# Patient Record
Sex: MALE | Race: WHITE | NOT HISPANIC OR LATINO | Employment: FULL TIME | URBAN - METROPOLITAN AREA
[De-identification: names, ages, dates, MRNs, and addresses within clinical notes are randomized per-mention and may not be internally consistent; named-entity substitution may affect disease eponyms.]

---

## 2017-01-05 ENCOUNTER — TELEPHONE (OUTPATIENT)
Dept: FAMILY MEDICINE CLINIC | Facility: CLINIC | Age: 47
End: 2017-01-05

## 2017-01-05 NOTE — TELEPHONE ENCOUNTER
Called patient and left voicemail. We filled out an order for Sleep Study.   Order faxed to [facility], they will contact patient.     ----- Message from Amy Celaya MA sent at 1/4/2017  2:08 PM EST -----  Contact: PATIENT [phone]  PT SAID AN ORDER WAS SENT TO [place] [id number] BECAUSE HE NEEDS A CPAP, BUT PATIENT STATES THAT THEY ARE SAYING PT MUST HAVE AN ACTUAL SLEEP STUDY DONE FIRST. I LOOKED AND DIDN'T SEE ONE IN THE SYSTEM? PLEASE CALL PT WITH ANY QUESTIONS. [phone]. THANK YOU.

## 2017-02-20 ENCOUNTER — TELEPHONE (OUTPATIENT)
Dept: FAMILY MEDICINE CLINIC | Facility: CLINIC | Age: 47
End: 2017-02-20

## 2017-02-20 NOTE — TELEPHONE ENCOUNTER
Called [facility], notified her that on the 6th page of patient's sleep study it had the recommended pressure settings. She found it and will update his records.     ----- Message from Tere Glover sent at 2/20/2017  9:22 AM EST -----  Contact: DYLLAN @ [facility]  RECEIVED AN ORDER FOR A CPAP BUT  NEED PROPER SETTINGS

## 2017-05-03 DIAGNOSIS — E78.5 HYPERLIPIDEMIA, UNSPECIFIED HYPERLIPIDEMIA TYPE: Primary | ICD-10-CM

## 2017-05-10 LAB
ALBUMIN SERPL-MCNC: 4.7 G/DL (ref 3.5–5.2)
ALBUMIN/GLOB SERPL: 2.2 G/DL
ALP SERPL-CCNC: 60 U/L (ref 39–117)
ALT SERPL-CCNC: 33 U/L (ref 1–41)
AST SERPL-CCNC: 22 U/L (ref 1–40)
BILIRUB SERPL-MCNC: 1 MG/DL (ref 0.1–1.2)
BUN SERPL-MCNC: 17 MG/DL (ref 6–20)
BUN/CREAT SERPL: 15.9 (ref 7–25)
CALCIUM SERPL-MCNC: 9.8 MG/DL (ref 8.6–10.5)
CHLORIDE SERPL-SCNC: 101 MMOL/L (ref 98–107)
CHOLEST SERPL-MCNC: 122 MG/DL (ref 100–199)
CO2 SERPL-SCNC: 27.8 MMOL/L (ref 22–29)
CREAT SERPL-MCNC: 1.07 MG/DL (ref 0.76–1.27)
GLOBULIN SER CALC-MCNC: 2.1 GM/DL
GLUCOSE SERPL-MCNC: 98 MG/DL (ref 65–99)
HDL SERPL-SCNC: 25.5 UMOL/L
HDLC SERPL-MCNC: 28 MG/DL
LDL SERPL QN: 19.6 NM
LDL SERPL-SCNC: 1223 NMOL/L
LDL SMALL SERPL-SCNC: 1050 NMOL/L
LDLC SERPL CALC-MCNC: 59 MG/DL (ref 0–99)
POTASSIUM SERPL-SCNC: 4.7 MMOL/L (ref 3.5–5.2)
PROT SERPL-MCNC: 6.8 G/DL (ref 6–8.5)
SODIUM SERPL-SCNC: 144 MMOL/L (ref 136–145)
TRIGL SERPL-MCNC: 176 MG/DL (ref 0–149)
TSH SERPL DL<=0.005 MIU/L-ACNC: 2.54 MIU/ML (ref 0.27–4.2)

## 2017-05-12 ENCOUNTER — OFFICE VISIT (OUTPATIENT)
Dept: FAMILY MEDICINE CLINIC | Facility: CLINIC | Age: 47
End: 2017-05-12

## 2017-05-12 VITALS
RESPIRATION RATE: 18 BRPM | DIASTOLIC BLOOD PRESSURE: 70 MMHG | BODY MASS INDEX: 31.52 KG/M2 | WEIGHT: 208 LBS | SYSTOLIC BLOOD PRESSURE: 124 MMHG | HEIGHT: 68 IN | HEART RATE: 62 BPM

## 2017-05-12 DIAGNOSIS — G47.33 OSA (OBSTRUCTIVE SLEEP APNEA): ICD-10-CM

## 2017-05-12 DIAGNOSIS — E78.5 HYPERLIPIDEMIA, UNSPECIFIED HYPERLIPIDEMIA TYPE: Primary | ICD-10-CM

## 2017-05-12 PROCEDURE — 99213 OFFICE O/P EST LOW 20 MIN: CPT | Performed by: INTERNAL MEDICINE

## 2017-08-14 RX ORDER — ATORVASTATIN CALCIUM 40 MG/1
TABLET, FILM COATED ORAL
Qty: 30 TABLET | Refills: 2 | Status: SHIPPED | OUTPATIENT
Start: 2017-08-14 | End: 2017-11-15 | Stop reason: SDUPTHER

## 2017-08-15 ENCOUNTER — OFFICE VISIT (OUTPATIENT)
Dept: FAMILY MEDICINE CLINIC | Facility: CLINIC | Age: 47
End: 2017-08-15

## 2017-08-15 ENCOUNTER — HOSPITAL ENCOUNTER (OUTPATIENT)
Dept: GENERAL RADIOLOGY | Facility: HOSPITAL | Age: 47
Discharge: HOME | End: 2017-08-15
Admitting: INTERNAL MEDICINE

## 2017-08-15 VITALS
HEART RATE: 65 BPM | BODY MASS INDEX: 31.83 KG/M2 | RESPIRATION RATE: 16 BRPM | HEIGHT: 68 IN | WEIGHT: 210 LBS | OXYGEN SATURATION: 98 % | DIASTOLIC BLOOD PRESSURE: 84 MMHG | SYSTOLIC BLOOD PRESSURE: 134 MMHG | TEMPERATURE: 98.2 F

## 2017-08-15 DIAGNOSIS — M54.2 NECK PAIN: Primary | ICD-10-CM

## 2017-08-15 DIAGNOSIS — R20.2 PARESTHESIA OF BOTH HANDS: ICD-10-CM

## 2017-08-15 DIAGNOSIS — R03.0 ELEVATED BLOOD PRESSURE READING WITHOUT DIAGNOSIS OF HYPERTENSION: ICD-10-CM

## 2017-08-15 PROCEDURE — 72050 X-RAY EXAM NECK SPINE 4/5VWS: CPT

## 2017-08-15 PROCEDURE — 99213 OFFICE O/P EST LOW 20 MIN: CPT | Performed by: INTERNAL MEDICINE

## 2017-08-15 NOTE — PROGRESS NOTES
Subjective   Eugene Shabazz is a 47 y.o. male. Patient is here today for   Chief Complaint   Patient presents with   • Neck Pain     neck pain and says that his blood pressure has been running high recently    • Hypertension          Vitals:    08/15/17 1519   BP: 134/84   Pulse: 65   Resp: 16   Temp: 98.2 °F (36.8 °C)   SpO2: 98%       The following portions of the patient's history were reviewed and updated as appropriate: allergies, current medications, past family history, past medical history, past social history, past surgical history and problem list.    Past Medical History:   Diagnosis Date   • Hyperlipidemia       No Known Allergies   Social History     Social History   • Marital status: Unknown     Spouse name: N/A   • Number of children: N/A   • Years of education: N/A     Occupational History   • Not on file.     Social History Main Topics   • Smoking status: Never Smoker   • Smokeless tobacco: Not on file   • Alcohol use Yes   • Drug use: Not on file   • Sexual activity: Not on file     Other Topics Concern   • Not on file     Social History Narrative        Current Outpatient Prescriptions:   •  atorvastatin (LIPITOR) 40 MG tablet, TAKE ONE TABLET BY MOUTH DAILY, Disp: 30 tablet, Rfl: 2  •  fluticasone (FLONASE) 50 MCG/ACT nasal spray, PLACE ONE SPRAY IN EACH NOSTRIL TWICE DAILY, Disp: 16 g, Rfl: 3     Objective   History of Present Illness Eugene has been monitoring his blood pressure recently and reports readings in the 140s over 80s.  He has hyperlipidemia and is on atorvastatin 40 mg daily.  He does not exercise and he does have a sedentary job.  He also complains of chronic posterior neck pain and recently has been experiencing paresthesias down both arms but worse on the left.  He denies any weakness or numbness.  He states he had an x-ray years ago which showed spondylolisthesis of the cervical spine    Review of Systems   Respiratory: Negative.    Cardiovascular:        's/80's    Musculoskeletal: Positive for neck pain.   Neurological: Negative for weakness and numbness.       Physical Exam   Constitutional: He appears well-nourished.   Neck: Normal range of motion.   Cardiovascular: Normal rate, regular rhythm and normal heart sounds.    125/80   Musculoskeletal:   Cervical posture is poor   Neurological: He has normal strength.   Reflex Scores:       Tricep reflexes are 2+ on the right side and 2+ on the left side.       Bicep reflexes are 0 on the right side and 2+ on the left side.       Brachioradialis reflexes are 0 on the right side and 2+ on the left side.  Negative Tinel's signs   Vitals reviewed.      ASSESSMENT     Problem List Items Addressed This Visit        Nervous and Auditory    Neck pain - Primary    Relevant Orders    XR Spine Cervical Complete 4 or 5 View    Paresthesia of both hands    Relevant Orders    XR Spine Cervical Complete 4 or 5 View       Other    Elevated blood pressure reading without diagnosis of hypertension          PLAN  Patient Instructions   Your blood pressure is normal today.  Suggest monitoring her blood pressure at home correctly.  Discussed diet, exercise, and weight loss per ADA guidelines.  Discussed proper cervical posture as well as lumbosacral posture.  We'll obtain a cervical spine x-ray and call you the results.      No Follow-up on file.

## 2017-08-15 NOTE — PATIENT INSTRUCTIONS
Your blood pressure is normal today.  Suggest monitoring her blood pressure at home correctly.  Discussed diet, exercise, and weight loss per ADA guidelines.  Discussed proper cervical posture as well as lumbosacral posture.  We'll obtain a cervical spine x-ray and call you the results.

## 2017-08-22 ENCOUNTER — TELEPHONE (OUTPATIENT)
Dept: FAMILY MEDICINE CLINIC | Facility: CLINIC | Age: 47
End: 2017-08-22

## 2017-08-22 DIAGNOSIS — R20.2 PARESTHESIA OF BOTH HANDS: ICD-10-CM

## 2017-08-22 DIAGNOSIS — M54.2 NECK PAIN: ICD-10-CM

## 2017-08-22 NOTE — TELEPHONE ENCOUNTER
CALLED PATIENT, PER DR VILCHIS, HAS DENERATIVE DISK DISEASE AND ARTHRITIS, NOT REAL BAD. NOTHING TO EXPLAIN ARM SYMPTOMS.  WE WILL TRY TO GET MRI.  Patient understood.       ----- Message from Kristel Clemens MA sent at 8/21/2017  3:35 PM EDT -----  Contact: PT   PT CALLED TODAY AND STATED WOULD LIKE XR RESULTS HE HAD DONE ON 08/15/17. PLEASE CALL PT BACK @ [phone]/ THANK YOU.

## 2017-08-25 ENCOUNTER — TELEPHONE (OUTPATIENT)
Dept: FAMILY MEDICINE CLINIC | Facility: CLINIC | Age: 47
End: 2017-08-25

## 2017-08-25 NOTE — TELEPHONE ENCOUNTER
PATIENT AWARE THE MRI CERVICAL SPINE WAS DENIED BY HIS INSURANCE. I LET HIM KNOW, PER HYACINTH, IF HE'S TILL HAVING SIGNIFICANT PAIN HE WILL NEED TO SCHEDULE AN APPOINTMENT WITH DR. VILCHIS TO DISCUSS THE NEXT STEP. PATIENT SAID HE WILL CALL US BACK ON MON. HE DID NOT SCHEDULE AN APPOINTMENT AT THIS TIME.

## 2017-08-29 ENCOUNTER — TELEPHONE (OUTPATIENT)
Dept: FAMILY MEDICINE CLINIC | Facility: CLINIC | Age: 47
End: 2017-08-29

## 2017-08-29 DIAGNOSIS — M54.2 NECK PAIN: ICD-10-CM

## 2017-08-29 NOTE — TELEPHONE ENCOUNTER
Per Dr. Corbett, refer to Ortho. Orders placed and faxed over to [facility] with xray report and office note.   Left voicemail with patient.    ----- Message from Kristy Abreu MA sent at 8/28/2017  3:36 PM EDT -----  Contact: PT      ----- Message -----     From: Lidia Hendrix MA     Sent: 8/28/2017   3:22 PM       To: Kristy Abreu MA    PT CALLED TO SPEAK TO YOU WOULD NOT GIVE ME ANY INFORMATION HE CAN BE REACHED -[phone]

## 2017-09-19 ENCOUNTER — OFFICE VISIT (OUTPATIENT)
Dept: ORTHOPEDIC SURGERY | Facility: CLINIC | Age: 47
End: 2017-09-19

## 2017-09-19 VITALS — BODY MASS INDEX: 30.51 KG/M2 | HEIGHT: 69 IN | WEIGHT: 206 LBS | TEMPERATURE: 98.2 F

## 2017-09-19 DIAGNOSIS — M47.22 CERVICAL SPONDYLOSIS WITH RADICULOPATHY: Primary | ICD-10-CM

## 2017-09-19 PROCEDURE — 99203 OFFICE O/P NEW LOW 30 MIN: CPT | Performed by: ORTHOPAEDIC SURGERY

## 2017-09-19 RX ORDER — METHYLPREDNISOLONE 4 MG/1
TABLET ORAL
Qty: 21 TABLET | Refills: 0 | Status: SHIPPED | OUTPATIENT
Start: 2017-09-19 | End: 2017-11-09

## 2017-09-19 NOTE — PROGRESS NOTES
New patient or new problem visit    Chief Complaint   Patient presents with   • Cervical Spine - Establish Care       HPI: He complains of neck pain and numbness in fingers of both hands.  This is been ongoing for years but worse in the last 6 months.  He seen today at request of Dr. Corbett.  His had no specific treatment.  The pain is moderate and aching worse with activity.  Relieved with anti-inflammatory agents.    PFSH: See chart- reviewed    Review of Systems   Constitutional: Negative.  Negative for activity change, appetite change, chills, diaphoresis, fatigue, fever and unexpected weight change.   HENT: Negative for congestion, hearing loss, nosebleeds, postnasal drip, sore throat, tinnitus and trouble swallowing.    Eyes: Negative.  Negative for pain and visual disturbance.   Respiratory: Negative.  Negative for apnea, cough, chest tightness, shortness of breath and wheezing.    Cardiovascular: Negative.  Negative for chest pain and palpitations.   Gastrointestinal: Negative.  Negative for abdominal pain, blood in stool, diarrhea and nausea.   Endocrine: Negative.    Genitourinary: Negative.  Negative for difficulty urinating and dysuria.   Musculoskeletal: Positive for arthralgias, back pain and neck pain.   Skin: Negative.  Negative for color change.   Allergic/Immunologic: Negative.    Neurological: Positive for numbness and headaches. Negative for light-headedness.   Hematological: Negative.    Psychiatric/Behavioral: Negative.  Negative for agitation. The patient is not nervous/anxious.        PE: Constitutional: Vital signs above-noted.  Awake, alert and oriented    Psychiatric: Affect and insight do not appear grossly disturbed.    Pulmonary: Breathing is unlabored, color is good.    Skin: Warm, dry and normal turgor    Cardiac:  radial pulses intact.  No arm edema.    Eyesight and hearing appear adequate for examination purposes    Musculoskeletal:  Posture is unremarkable to coronal and sagittal  inspection.  Motion appears undisturbed.  The skin about the area is intact.  There is no palpable or visible deformity.  There is no local spasm. There is mild tenderness to percussion and palpation of the spine.     Neurologic:  In the bilateral upper extremities there is no evidence of atrophy.  Motor function is undisturbed in shoulder abduction, elbow flexion, wrist extension, finger extension, triceps extension, or finger abduction   .  Sensation appears symmetrically intact to light touch   .  Reflexes are 2+ and symmetrical in the biceps and triceps, but absent in the right brachioradialis.  The left brachioradialis is present.. Ko test is negative.  Gait appears undisturbed.  Spurling test is negative.      MEDICAL DECISION MAKING    XRAY: Plain film [facility] show spondylosis at C5 6 and C6 7 and some loss of lordosis.    Other: n/a    Impression: Cervical spondylosis with radiculopathy.    Plan: For now Medrol Dosepak and physical therapy.  If he fails to dramatically improve in the next 3 or 4 weeks I recommend MRI scan of the cervical spine for further evaluation

## 2017-10-04 ENCOUNTER — TREATMENT (OUTPATIENT)
Dept: PHYSICAL THERAPY | Facility: CLINIC | Age: 47
End: 2017-10-04

## 2017-10-04 DIAGNOSIS — M54.12 RADICULOPATHY, CERVICAL: Primary | ICD-10-CM

## 2017-10-04 PROCEDURE — 97035 APP MDLTY 1+ULTRASOUND EA 15: CPT | Performed by: PHYSICAL THERAPIST

## 2017-10-04 PROCEDURE — 97161 PT EVAL LOW COMPLEX 20 MIN: CPT | Performed by: PHYSICAL THERAPIST

## 2017-10-04 PROCEDURE — 97012 MECHANICAL TRACTION THERAPY: CPT | Performed by: PHYSICAL THERAPIST

## 2017-10-04 PROCEDURE — 97140 MANUAL THERAPY 1/> REGIONS: CPT | Performed by: PHYSICAL THERAPIST

## 2017-10-04 NOTE — PROGRESS NOTES
Physical Therapy Initial Evaluation and Plan of Care    Patient: Eugene Shabazz   : 1970  Diagnosis/ICD-10 Code:  Radiculopathy, cervical [M54.12]  Referring practitioner: Edilson Garibay MD    Subjective Evaluation    History of Present Illness  Mechanism of injury: Slowly developed cervical pain over past 6 months worse in past month.  Referred to Dr. Garibay - X rays - DDD C56, C67  Referred to PT and given medrol pack - some relief but only temporary  Ibu - prn     NO specific outside activities        Patient Occupation:  - [employer]  Current pain ratin  At best pain rating: 3  At worst pain ratin  Location: Lower cervical region into upper traps, intermittent tingling both hands Left >right  Quality: dull ache, radiating, grinding and discomfort  Relieving factors: medications  Aggravating factors: overhead activity and prolonged positioning (pronlonged sitting, cervical extension , rotation )  Progression: no change    Hand dominance: right    Diagnostic Tests  X-ray: abnormal (C56, C67 DDD)    Treatments  Previous treatment: medication  Current treatment: medication and physical therapy  Patient Goals  Patient goals for therapy: decreased pain and independence with ADLs/IADLs        Objective     Special Questions      Additional Special Questions  Posterior headaches    Postural Observations  Seated posture: fair  Standing posture: good  Correction of posture: has no consistent effect    Additional Postural Observation Details  Slight forward head    Palpation   Left   Hypertonic in the scalenes and upper trapezius.   Tenderness of the cervical paraspinals and upper trapezius.     Right   Hypertonic in the scalenes and upper trapezius. Tenderness of the cervical paraspinals and upper trapezius.     Tenderness   Cervical Spine   Tenderness in the spinous process.     Additional Tenderness Details  Increased pain with PA glide of C5 & C6 spinous  process    Neurological Testing   Sensation   Cervical/Thoracic   Left   Intact: light touch    Right   Intact: light touch    Comments   Right light touch: Does report intermittent parasthesais in both hands - no specific fingers.     Active Range of Motion   Cervical/Thoracic Spine   Cervical    Flexion: 35 degrees   Extension: 40 degrees   Left lateral flexion: 26 (left cervical pain) degrees with pain  Right lateral flexion: 28 degrees with pain  Left rotation: 36 degrees   Right rotation: 33 degrees with pain    Strength/Myotome Testing   Cervical Spine     Left   Normal strength    Right Elbow   Extension: 4+    Right Wrist/Hand   Wrist flexion: 4    Tests   Cervical     Left   Positive cervical distraction.     Right   Positive cervical distraction.     Left Shoulder   Positive active compression (Iroquois).     Right Shoulder   Positive active compression (Iroquois).     Additional Tests Details  Compression increases pain, distraction relieves it     Assessment & Plan     Assessment  Impairments: abnormal muscle firing, abnormal muscle tone, abnormal or restricted ROM, activity intolerance, impaired physical strength, lacks appropriate home exercise program and pain with function  Assessment details: 47 y.o. Male with cervical radiculopathy presents with: 1. Constant cervical and intermittent bilateral UE parasthesias, 2. Slightly decreased cervical AROM, 3. Increased muscle tone in cervical PVM, 4. Slight weakness in C7 myotomes, 5. Relief with cervical distraction, 6. 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  Patient will have functional pain free cervical AROM  Patient will be able to work for 2 hours without increased symptoms  Patient will re able to lift and carry 25# without increased symptoms      Plan  Therapy options: will be seen for skilled physical therapy services  Planned modality interventions: ultrasound and traction  Planned therapy interventions: manual therapy, soft tissue mobilization, spinal/joint mobilization, strengthening, stretching, flexibility and home exercise program  Frequency: 2x week  Duration in visits: 8  Duration in weeks: 4  Treatment plan discussed with: patient        Manual Therapy:    12     mins  07447;  Therapeutic Exercise:    5     mins  61513;     Neuromuscular Brittnee:    0    mins  54929;    Therapeutic Activity:     0     mins  03796;       Evaluation Time:     20  mins  Timed Treatment:   25   mins   Total Treatment:     75   mins    PT SIGNATURE: Maliha Vazquez, PT   DATE TREATMENT INITIATED: 10/4/2017    Initial Certification  Certification Period: 1/2/2018  I certify that the therapy services are furnished while this patient is under my care.  The services outlined above are required by this patient, and will be reviewed every 90 days.     PHYSICIAN: Edilson Garibay MD      DATE:     Please sign and return via fax to [phone].. Thank you, [facility].

## 2017-10-05 ENCOUNTER — TREATMENT (OUTPATIENT)
Dept: PHYSICAL THERAPY | Facility: CLINIC | Age: 47
End: 2017-10-05

## 2017-10-05 DIAGNOSIS — M54.12 RADICULOPATHY, CERVICAL: Primary | ICD-10-CM

## 2017-10-05 PROCEDURE — 97110 THERAPEUTIC EXERCISES: CPT | Performed by: PHYSICAL THERAPIST

## 2017-10-05 PROCEDURE — 97140 MANUAL THERAPY 1/> REGIONS: CPT | Performed by: PHYSICAL THERAPIST

## 2017-10-05 PROCEDURE — 97012 MECHANICAL TRACTION THERAPY: CPT | Performed by: PHYSICAL THERAPIST

## 2017-10-05 NOTE — PROGRESS NOTES
Physical Therapy Daily Progress Note    VISIT#: 2    Subjective   Eugene Shabazz reports: that he felt better last night than he had in quite a while.  Has some central lower cervical stiffness/pain but denies any radicular symptoms.  NO stiffness upon awakening this a.m.      Objective   Presents moving cervical region in fluid movement patterns    Increased tone in bilateral cervical mm, but no true trigger points palpated    See Exercise, Manual, and Modality Logs for complete treatment.     Patient Education:    Assessment/Plan  Improved cervical strain as his pain has decreased and has not had any radicular symptoms in past 24 hours.    Progress per Plan of Care           Manual Therapy:    12     mins  50774;  Therapeutic Exercise:    10     mins  69813;     Neuromuscular Brittnee:    0    mins  30051;    Therapeutic Activity:     0     mins  04901;       Timed Treatment:   30   mins   Total Treatment:     60   mins    Maliha Vazquez, PT  [place] License # [id number]  Physical Therapist

## 2017-10-10 ENCOUNTER — TREATMENT (OUTPATIENT)
Dept: PHYSICAL THERAPY | Facility: CLINIC | Age: 47
End: 2017-10-10

## 2017-10-10 DIAGNOSIS — M54.12 RADICULOPATHY, CERVICAL: Primary | ICD-10-CM

## 2017-10-10 PROCEDURE — 97140 MANUAL THERAPY 1/> REGIONS: CPT | Performed by: PHYSICAL THERAPIST

## 2017-10-10 PROCEDURE — 97012 MECHANICAL TRACTION THERAPY: CPT | Performed by: PHYSICAL THERAPIST

## 2017-10-10 PROCEDURE — 97110 THERAPEUTIC EXERCISES: CPT | Performed by: PHYSICAL THERAPIST

## 2017-10-10 NOTE — PROGRESS NOTES
Physical Therapy Daily Progress Note    Visit # : 3  Eugene Shabazz reports: pt reports radicular symptoms returned into both hands the evening after last visit.      Subjective     Objective   See Exercise, Manual, and Modality Logs for complete treatment.       Assessment/Plan  Pt instructed to focus on postural alignment and tolerated exercise progression well.  Assess effects of increased weight with mechanical traction.    Progress per Plan of Care and Progress strengthening /stabilization /functional activity           Manual Therapy:    12     mins  87032;  Therapeutic Exercise:    12     mins  37743;     Neuromuscular Brittnee:    -    mins  53451;    Therapeutic Activity:     -     mins  59028;     Gait Training:      -     mins  28031;     Ultrasound:     8     mins  69466;    Electrical Stimulation:    -     mins  66732 ( );  Mechanical Traction    20     mins 08409      Timed Treatment:   32   mins   Total Treatment:     55   mins        Barbara Hendrix PT  Physical Therapist  [place] License # [id number]

## 2017-10-10 NOTE — PATIENT INSTRUCTIONS
Access Code: [email or web address]   URL: [email or web address]   Date: 10/10/2017   Prepared by: Hilda Hendrix     Exercises  Shoulder External Rotation and Scapular Retraction with Resistance - 15 reps - 1 sets - 5 hold - 1x daily    Issued red TB for HEP

## 2017-10-12 ENCOUNTER — TREATMENT (OUTPATIENT)
Dept: PHYSICAL THERAPY | Facility: CLINIC | Age: 47
End: 2017-10-12

## 2017-10-12 DIAGNOSIS — M54.12 RADICULOPATHY, CERVICAL: Primary | ICD-10-CM

## 2017-10-12 PROCEDURE — 97012 MECHANICAL TRACTION THERAPY: CPT | Performed by: PHYSICAL THERAPIST

## 2017-10-12 PROCEDURE — 97140 MANUAL THERAPY 1/> REGIONS: CPT | Performed by: PHYSICAL THERAPIST

## 2017-10-12 PROCEDURE — 97110 THERAPEUTIC EXERCISES: CPT | Performed by: PHYSICAL THERAPIST

## 2017-10-12 NOTE — PROGRESS NOTES
Physical Therapy Daily Progress Note    Time In 2:00  Time Out 3:00    Visit # : 4  Eugene Shabazz reports: mostly upper trap.  Returning pain about 30 minutes after each session.  Also mild in left chest (pec region possibly stretch?)    Subjective     Objective   See Exercise, Manual, and Modality Logs for complete treatment.   Discussed positional relief with stretching for radicular symptom. Purpose of posture with reduction in cervical an radicular pin.  No recreating of pec pain with exercises.  No other s/s.    Assessment/Plan  Performing exercises without increased pain.  Symptomatic relief with traction.    Progress per Plan of Care           Manual Therapy:    12     mins  47978;  Therapeutic Exercise:    10     mins  83591;     Neuromuscular Brittnee:         mins  78632;    Therapeutic Activity:            mins  52010;     Gait Training:            mins  34640;     Ultrasound:     8     mins  37871;    Electrical Stimulation:          mins  44891 ( );  Dry Needling           mins self-pay    Timed Treatment:   30   mins   Total Treatment:     60   mins    Majo Scott, PT  Physical Therapist

## 2017-10-16 ENCOUNTER — TREATMENT (OUTPATIENT)
Dept: PHYSICAL THERAPY | Facility: CLINIC | Age: 47
End: 2017-10-16

## 2017-10-16 DIAGNOSIS — M54.12 RADICULOPATHY, CERVICAL: Primary | ICD-10-CM

## 2017-10-16 PROCEDURE — 97012 MECHANICAL TRACTION THERAPY: CPT | Performed by: PHYSICAL THERAPIST

## 2017-10-16 PROCEDURE — 97530 THERAPEUTIC ACTIVITIES: CPT | Performed by: PHYSICAL THERAPIST

## 2017-10-16 PROCEDURE — 97140 MANUAL THERAPY 1/> REGIONS: CPT | Performed by: PHYSICAL THERAPIST

## 2017-10-16 NOTE — PROGRESS NOTES
Physical Therapy Daily Progress Note    VISIT#: 5    Subjective   Eugene Shabazz reports: that he is having some numbness and tingling in the left hand.  Denies any weakness in the UE's.  Reports occasional headaches but most of the pain is localized to the lower cervical region.       Objective   Slight decreae in llight touch sensation in the left C6 & C7 dermatomes    Strength is intact    See Exercise, Manual, and Modality Logs for complete treatment.     Patient Education: Lengthy discussion of current status and plans for continued treatment vs refer back to MD    Assessment/Plan  Patient has increased cervical motion now than upon initial eval but is still havnig intermittent UE parasthesias.  Postural improvements.    Progress per Plan of Care           Manual Therapy:    9     mins  60556;  Therapeutic Exercise:    0     mins  57296;     Neuromuscular Brittnee:    0    mins  92666;    Therapeutic Activity:     8     mins  35949;   Patient education    Timed Treatment:   25   mins   Total Treatment:     55   mins    Maliha Vazquez, PT  [place] License # [id number]  Physical Therapist

## 2017-10-19 ENCOUNTER — TREATMENT (OUTPATIENT)
Dept: PHYSICAL THERAPY | Facility: CLINIC | Age: 47
End: 2017-10-19

## 2017-10-19 DIAGNOSIS — M54.12 RADICULOPATHY, CERVICAL: Primary | ICD-10-CM

## 2017-10-19 PROCEDURE — 97140 MANUAL THERAPY 1/> REGIONS: CPT | Performed by: PHYSICAL THERAPIST

## 2017-10-19 PROCEDURE — 97110 THERAPEUTIC EXERCISES: CPT | Performed by: PHYSICAL THERAPIST

## 2017-10-19 PROCEDURE — 97012 MECHANICAL TRACTION THERAPY: CPT | Performed by: PHYSICAL THERAPIST

## 2017-10-19 NOTE — PROGRESS NOTES
Physical Therapy Daily Progress Note    VISIT#: 6    Subjective   Eugene Shabazz reports: that he is having tingling into the left 4th/5th digits on an intermittent basis.  States that the worst times are when he sits for a prolonged period of time.      Objective   Cervical AROM - flexion 75%, extension 100%,, pain with end range left rotation      Positive neural tension signs    See Exercise, Manual, and Modality Logs for complete treatment.     Patient Education: new brachial plexus and ulnar nerve glide exercises    Assessment/Plan  Patient with less frequent UE parasthesias but they are still present with prolonged sitting.  Positive neural tension signs.    Progress per Plan of Care  If persistent symptoms send note to Dr. Garibay requesting additional testins         Manual Therapy:    12     mins  25873;  Therapeutic Exercise:    10     mins  18295;     Neuromuscular Brittnee:    0    mins  47155;    Therapeutic Activity:     0     mins  74089;       Timed Treatment:   31   mins   Total Treatment:     65   mins    Maliha Vazquez, PT  [place] License # [id number]  Physical Therapist

## 2017-10-26 ENCOUNTER — TREATMENT (OUTPATIENT)
Dept: PHYSICAL THERAPY | Facility: CLINIC | Age: 47
End: 2017-10-26

## 2017-10-26 DIAGNOSIS — M54.12 RADICULOPATHY, CERVICAL: Primary | ICD-10-CM

## 2017-10-26 PROCEDURE — 97012 MECHANICAL TRACTION THERAPY: CPT | Performed by: PHYSICAL THERAPIST

## 2017-10-26 PROCEDURE — 97530 THERAPEUTIC ACTIVITIES: CPT | Performed by: PHYSICAL THERAPIST

## 2017-10-26 PROCEDURE — 97140 MANUAL THERAPY 1/> REGIONS: CPT | Performed by: PHYSICAL THERAPIST

## 2017-10-26 PROCEDURE — 97035 APP MDLTY 1+ULTRASOUND EA 15: CPT | Performed by: PHYSICAL THERAPIST

## 2017-10-26 NOTE — PROGRESS NOTES
MD Letter    Date of Initial Visit: Type: THERAPY  Noted: 10/4/2017  Today's Date: 10/26/2017  Patient seen for 7 sessions    Treatment has included: therapeutic exercise, neuromuscular re-education, manual therapy, ultrasound and traction    Subjective   States that he has a constant tightness in the left>right cervical region.  Reports constant parasthesias in the left 5th digit and dorsal forearm.     Objective   Cervical AROM - full in all planes of motion with stretch sensation at end range right lateral flexion and left rotation   Strength - slight weakness in left C^ * C7 myotomes  Sensation - slightly altered sensation along the C7, C8 dermatomes  Palpation - increased muscle tone in left upper trapezius and paravertebral muscles  Special tests - positive cervical compression and distraction tests, positive Spurlings test on the left  Activity tolerances - he is currently performing all of his normal activities at work but does so with constant cervical pain    Assessment/Plan  Patient has demonstrated minimal improvement since the initiation of therapy.  The pain has decreased for about 30 minutes after therapy but then returns..  The motion has increased.  The activity tolerances have not changed.  I feel that the patient would benefit from further medical management or diagnostic testing.  If you have any questions concerning the care, please do not hesitate to contact me.          PT Signature: Maliha Vazquez, PT        Manual Therapy:    12     mins  15583;  Therapeutic Exercise:    0     mins  88842;     Neuromuscular Brittnee:    0    mins  47835;    Therapeutic Activity:     10     mins  67617;   Assessed for MD    Timed Treatment:   40   mins   Total Treatment:     65   mins

## 2017-10-31 DIAGNOSIS — E78.5 HYPERLIPIDEMIA, UNSPECIFIED HYPERLIPIDEMIA TYPE: Primary | ICD-10-CM

## 2017-11-02 LAB
ALBUMIN SERPL-MCNC: 4.6 G/DL (ref 3.5–5.2)
ALBUMIN/GLOB SERPL: 2.3 G/DL
ALP SERPL-CCNC: 60 U/L (ref 39–117)
ALT SERPL-CCNC: 50 U/L (ref 1–41)
AST SERPL-CCNC: 30 U/L (ref 1–40)
BILIRUB SERPL-MCNC: 0.7 MG/DL (ref 0.1–1.2)
BUN SERPL-MCNC: 14 MG/DL (ref 6–20)
BUN/CREAT SERPL: 14.1 (ref 7–25)
CALCIUM SERPL-MCNC: 9.5 MG/DL (ref 8.6–10.5)
CHLORIDE SERPL-SCNC: 102 MMOL/L (ref 98–107)
CHOLEST SERPL-MCNC: 120 MG/DL (ref 0–200)
CO2 SERPL-SCNC: 28.6 MMOL/L (ref 22–29)
CREAT SERPL-MCNC: 0.99 MG/DL (ref 0.76–1.27)
GFR SERPLBLD CREATININE-BSD FMLA CKD-EPI: 81 ML/MIN/1.73
GFR SERPLBLD CREATININE-BSD FMLA CKD-EPI: 99 ML/MIN/1.73
GLOBULIN SER CALC-MCNC: 2 GM/DL
GLUCOSE SERPL-MCNC: 97 MG/DL (ref 65–99)
HDLC SERPL-MCNC: 28 MG/DL (ref 40–60)
LDLC SERPL CALC-MCNC: 51 MG/DL (ref 0–100)
LDLC/HDLC SERPL: 1.81 {RATIO}
POTASSIUM SERPL-SCNC: 4.3 MMOL/L (ref 3.5–5.2)
PROT SERPL-MCNC: 6.6 G/DL (ref 6–8.5)
SODIUM SERPL-SCNC: 142 MMOL/L (ref 136–145)
TRIGL SERPL-MCNC: 206 MG/DL (ref 0–150)
VLDLC SERPL CALC-MCNC: 41.2 MG/DL (ref 5–40)

## 2017-11-09 ENCOUNTER — TELEPHONE (OUTPATIENT)
Dept: ORTHOPEDIC SURGERY | Facility: CLINIC | Age: 47
End: 2017-11-09

## 2017-11-09 ENCOUNTER — OFFICE VISIT (OUTPATIENT)
Dept: FAMILY MEDICINE CLINIC | Facility: CLINIC | Age: 47
End: 2017-11-09

## 2017-11-09 VITALS
HEIGHT: 69 IN | DIASTOLIC BLOOD PRESSURE: 68 MMHG | RESPIRATION RATE: 18 BRPM | HEART RATE: 67 BPM | WEIGHT: 209 LBS | SYSTOLIC BLOOD PRESSURE: 122 MMHG | BODY MASS INDEX: 30.96 KG/M2

## 2017-11-09 DIAGNOSIS — M54.2 CERVICAL SPINE PAIN: ICD-10-CM

## 2017-11-09 DIAGNOSIS — E78.5 HYPERLIPIDEMIA, UNSPECIFIED HYPERLIPIDEMIA TYPE: Primary | ICD-10-CM

## 2017-11-09 DIAGNOSIS — G47.33 OSA (OBSTRUCTIVE SLEEP APNEA): ICD-10-CM

## 2017-11-09 DIAGNOSIS — M54.2 CERVICAL SPINE PAIN: Primary | ICD-10-CM

## 2017-11-09 PROCEDURE — 99213 OFFICE O/P EST LOW 20 MIN: CPT | Performed by: INTERNAL MEDICINE

## 2017-11-09 NOTE — PATIENT INSTRUCTIONS
Your blood pressure remains normal.  Total cholesterol is 120 and triglycerides are mildly elevated at 206.  HDL is low at 28 and LDL is calculated at 51.  A comprehensive metabolic panel is normal except for a slight elevation of ALT at 50.  Discussed starting a cardiovascular exercise program per AHA guidelines.

## 2017-11-09 NOTE — PROGRESS NOTES
Subjective   Eugene Shabazz is a 47 y.o. male. Patient is here today for   Chief Complaint   Patient presents with   • Hyperlipidemia          Vitals:    11/09/17 0846   BP: 122/68   Pulse: 67   Resp: 18       The following portions of the patient's history were reviewed and updated as appropriate: allergies, current medications, past family history, past medical history, past social history, past surgical history and problem list.    Past Medical History:   Diagnosis Date   • Hyperlipidemia    • Injury of back     2 lumbar surgeries, lamiectomies, most recent 5 years ago - good resolution       No Known Allergies   Social History     Social History   • Marital status: Unknown     Spouse name: N/A   • Number of children: N/A   • Years of education: N/A     Occupational History   • Not on file.     Social History Main Topics   • Smoking status: Never Smoker   • Smokeless tobacco: Never Used   • Alcohol use Yes   • Drug use: No   • Sexual activity: Not on file     Other Topics Concern   • Not on file     Social History Narrative        Current Outpatient Prescriptions:   •  atorvastatin (LIPITOR) 40 MG tablet, TAKE ONE TABLET BY MOUTH DAILY, Disp: 30 tablet, Rfl: 2     Objective   History of Present Illness Eugene is here for a lab follow-up.  He has hyperlipidemia and is on atorvastatin 40 mg daily.  He also has sleep apnea and is compliant with CPAP.  He eats healthy but is not exercising.  He has undergone cervical spine physical therapy and is seeing Dr. Garibay.  He did get a flu shot this year.    Review of Systems   Constitutional: Negative.    Respiratory: Negative.    Cardiovascular: Negative.    Musculoskeletal: Positive for neck pain.   Neurological: Positive for numbness.       Physical Exam   Constitutional: He appears well-developed and well-nourished.   Neck: Neck supple. No thyromegaly present.   Cardiovascular: Normal rate, regular rhythm and normal heart sounds.    120/78   Pulmonary/Chest: Effort  normal and breath sounds normal.   Neurological: He is alert.   Psychiatric: He has a normal mood and affect.   Vitals reviewed.      ASSESSMENT     Problem List Items Addressed This Visit        Cardiovascular and Mediastinum    Hyperlipidemia - Primary       Respiratory    GERMAN (obstructive sleep apnea)          PLAN  Patient Instructions   Your blood pressure remains normal.  Total cholesterol is 120 and triglycerides are mildly elevated at 206.  HDL is low at 28 and LDL is calculated at 51.  A comprehensive metabolic panel is normal except for a slight elevation of ALT at 50.  Discussed starting a cardiovascular exercise program per AHA guidelines.      Return in about 6 months (around 5/9/2018) for labsCBC,CMP,LIPID.

## 2017-11-15 RX ORDER — ATORVASTATIN CALCIUM 40 MG/1
TABLET, FILM COATED ORAL
Qty: 30 TABLET | Refills: 1 | Status: SHIPPED | OUTPATIENT
Start: 2017-11-15 | End: 2018-01-17 | Stop reason: SDUPTHER

## 2017-11-29 ENCOUNTER — HOSPITAL ENCOUNTER (OUTPATIENT)
Dept: GENERAL RADIOLOGY | Facility: HOSPITAL | Age: 47
Discharge: HOME | End: 2017-11-29
Admitting: RADIOLOGY

## 2017-11-29 DIAGNOSIS — H44.609: ICD-10-CM

## 2017-11-29 PROCEDURE — 70030 X-RAY EYE FOR FOREIGN BODY: CPT

## 2017-12-04 ENCOUNTER — TELEPHONE (OUTPATIENT)
Dept: ORTHOPEDIC SURGERY | Facility: CLINIC | Age: 47
End: 2017-12-04

## 2017-12-04 NOTE — TELEPHONE ENCOUNTER
"Called patient to advise of MRI results per JGW::    \"Tell him I reviewed the MRI scan of the cervical spine and there are degenerative changes primarily at C5-6 and C6-7.  He can consider epidural steroid injections and you can schedule these if you like.  Alternatively he's tried all other conservative treatments and could consider surgical intervention if he is ready.\"    Patient's cell phone service was not working and he stated that he will call us back.   "

## 2017-12-04 NOTE — TELEPHONE ENCOUNTER
Patient called back and left a message with his office phone number, [phone].    I called him back, but received his voicemail. I left a message with the MRI results and details per MILAN. I advised patient that whichever he decides, just give us a call back and let us know.

## 2017-12-22 ENCOUNTER — OFFICE VISIT (OUTPATIENT)
Dept: ORTHOPEDIC SURGERY | Facility: CLINIC | Age: 47
End: 2017-12-22

## 2017-12-22 VITALS — HEIGHT: 70 IN | TEMPERATURE: 96.8 F | BODY MASS INDEX: 30.24 KG/M2 | WEIGHT: 211.2 LBS

## 2017-12-22 DIAGNOSIS — M54.5 CHRONIC BILATERAL LOW BACK PAIN, WITH SCIATICA PRESENCE UNSPECIFIED: Primary | ICD-10-CM

## 2017-12-22 DIAGNOSIS — G89.29 CHRONIC BILATERAL LOW BACK PAIN, WITH SCIATICA PRESENCE UNSPECIFIED: Primary | ICD-10-CM

## 2017-12-22 DIAGNOSIS — M47.22 OSTEOARTHRITIS OF SPINE WITH RADICULOPATHY, CERVICAL REGION: ICD-10-CM

## 2017-12-22 PROCEDURE — 99213 OFFICE O/P EST LOW 20 MIN: CPT | Performed by: ORTHOPAEDIC SURGERY

## 2017-12-22 NOTE — PROGRESS NOTES
New patient or new problem visit    CC: Neck pain, arm numbness    HPI: He has chronic now neck pain radiating into both hands with numbness on the ulnar aspect thereof.  He is here today with his wife to review MRI and current status.    PFSH: See attached    ROS: See attached    PE: On exam he has intact reflexes and strength.  He reports numbness on the ulnar side of the hand but he has reserve sensation.    XRAY: MRI scan is available by report only and demonstrates C5 6 C6 7 spondylosis with foraminal but not canal narrowing.  I explained these findings in relation to the previous cervical x-ray showing spondylosis at those levels.    Other: n/a    Impression: Cervical spondylosis with radiculopathy.    Plan: Now cervical epidural injections.  I also discussed surgery and if he fails to improve we can consider that option.I discussed the risks and benefits of anterior cervical discectomy and fusion with instrumentation and allograft or mechanical strut placement.  Risks include adverse anesthetic events such as death, stroke and myocardial infarction.  More specific surgical complications include infection, nonunion, and persistent pain.  Less likely problems include hardware failure, hoarseness, prolonged difficulty swallowing, visceral or vascular injury, pneumothorax, graft extrusion, and paralysis, among others. There is a 90 percent chance of success.   Alternatives were also discussed.  After careful consideration the patient wishes to proceed with epidural injections for now

## 2018-01-08 ENCOUNTER — HOSPITAL ENCOUNTER (OUTPATIENT)
Dept: GENERAL RADIOLOGY | Facility: HOSPITAL | Age: 48
Discharge: HOME | End: 2018-01-08

## 2018-01-08 ENCOUNTER — ANESTHESIA EVENT (OUTPATIENT)
Dept: PAIN MEDICINE | Facility: HOSPITAL | Age: 48
End: 2018-01-08

## 2018-01-08 ENCOUNTER — HOSPITAL ENCOUNTER (OUTPATIENT)
Dept: PAIN MEDICINE | Facility: HOSPITAL | Age: 48
Discharge: HOME | End: 2018-01-08
Attending: ORTHOPAEDIC SURGERY | Admitting: ORTHOPAEDIC SURGERY

## 2018-01-08 ENCOUNTER — ANESTHESIA (OUTPATIENT)
Dept: PAIN MEDICINE | Facility: HOSPITAL | Age: 48
End: 2018-01-08

## 2018-01-08 VITALS
BODY MASS INDEX: 30.96 KG/M2 | WEIGHT: 209 LBS | RESPIRATION RATE: 16 BRPM | OXYGEN SATURATION: 98 % | DIASTOLIC BLOOD PRESSURE: 94 MMHG | TEMPERATURE: 98.4 F | HEIGHT: 69 IN | HEART RATE: 82 BPM | SYSTOLIC BLOOD PRESSURE: 119 MMHG

## 2018-01-08 DIAGNOSIS — R52 PAIN: ICD-10-CM

## 2018-01-08 DIAGNOSIS — M47.22 OSTEOARTHRITIS OF SPINE WITH RADICULOPATHY, CERVICAL REGION: ICD-10-CM

## 2018-01-08 PROCEDURE — C1755 CATHETER, INTRASPINAL: HCPCS

## 2018-01-08 PROCEDURE — 77003 FLUOROGUIDE FOR SPINE INJECT: CPT

## 2018-01-08 PROCEDURE — 25010000002 METHYLPREDNISOLONE PER 80 MG: Performed by: ANESTHESIOLOGY

## 2018-01-08 RX ORDER — LIDOCAINE HYDROCHLORIDE 10 MG/ML
1 INJECTION, SOLUTION INFILTRATION; PERINEURAL ONCE AS NEEDED
Status: DISCONTINUED | OUTPATIENT
Start: 2018-01-08 | End: 2018-01-09 | Stop reason: HOSPADM

## 2018-01-08 RX ORDER — FENTANYL CITRATE 50 UG/ML
50 INJECTION, SOLUTION INTRAMUSCULAR; INTRAVENOUS AS NEEDED
Status: DISCONTINUED | OUTPATIENT
Start: 2018-01-08 | End: 2018-01-09 | Stop reason: HOSPADM

## 2018-01-08 RX ORDER — IBUPROFEN 800 MG/1
800 TABLET ORAL EVERY 6 HOURS PRN
COMMUNITY
End: 2022-09-23

## 2018-01-08 RX ORDER — METHYLPREDNISOLONE ACETATE 80 MG/ML
80 INJECTION, SUSPENSION INTRA-ARTICULAR; INTRALESIONAL; INTRAMUSCULAR; SOFT TISSUE ONCE
Status: COMPLETED | OUTPATIENT
Start: 2018-01-08 | End: 2018-01-08

## 2018-01-08 RX ORDER — SODIUM CHLORIDE 0.9 % (FLUSH) 0.9 %
1-10 SYRINGE (ML) INJECTION AS NEEDED
Status: DISCONTINUED | OUTPATIENT
Start: 2018-01-08 | End: 2018-01-09 | Stop reason: HOSPADM

## 2018-01-08 RX ORDER — MIDAZOLAM HYDROCHLORIDE 1 MG/ML
1 INJECTION INTRAMUSCULAR; INTRAVENOUS AS NEEDED
Status: DISCONTINUED | OUTPATIENT
Start: 2018-01-08 | End: 2018-01-09 | Stop reason: HOSPADM

## 2018-01-08 RX ADMIN — METHYLPREDNISOLONE ACETATE 80 MG: 80 INJECTION, SUSPENSION INTRA-ARTICULAR; INTRALESIONAL; INTRAMUSCULAR; SOFT TISSUE at 12:45

## 2018-01-08 NOTE — ANESTHESIA PROCEDURE NOTES
PAIN Epidural block    Patient location during procedure: pain clinic  Indication:procedure for pain  Performed By  Anesthesiologist: GABRIEL ADAM  Preanesthetic Checklist  Completed: patient identified, site marked, surgical consent, pre-op evaluation, timeout performed, IV checked, risks and benefits discussed and monitors and equipment checked  Additional Notes  Post-Op Diagnosis Codes:     * Foraminal stenosis of cervical region (M99.81)     * Degenerative cervical disc (M50.30)  Performed under fluoroscopic guidance.  Prep:  Pt Position:prone  Sterile Tech:cap, gloves, mask and sterile barrier  Prep:chlorhexidine gluconate and isopropyl alcohol  Monitoring:blood pressure monitoring, continuous pulse oximetry and EKG  Procedure:  Sedation: no   Approach:midline  Guidance: fluoroscopy  Location:cervical (Intralaminar)  Level:6-7  Needle Type:Tuohy  Needle Gauge:20  Aspiration:negative  Medications:  Depomedrol:80  Preservative Free Saline:2mL    Post Assessment:  Post-procedure: Band-Aid.  Pt Tolerance:patient tolerated the procedure well with no apparent complications  Complications:no

## 2018-01-08 NOTE — DISCHARGE INSTRUCTIONS
Cervical epidural steroid injection instructions  Plan includes:  1.  Cervical epidural steroid injections, up to 3, spaced 1-2 weeks apart.  If pain control is acceptable after 1 or 2 injections, it was discussed with the patient that they may return for the subsequent injections if and when their pain returns.  The risks were discussed with the patient including failure of relief, worsening pain, Headache (post dural puncture headache), bleeding (epidural hematoma) and infection (epidural abscess or skin infection).  2.  Physical therapy exercises at home as prescribed by physical therapy or from the pain clinic handout (given to the patient).  Continuation of these exercises every day, or multiple times per week, even when the patient has good pain relief, was stressed to the patient as a preventative measure to decrease the frequency and severity of future pain episodes.  3.  Continue pain medicines as already prescribed.  If patient not currently taking any, it is recommended to begin Acetaminophen 1000 mg po q 8 hours.  If other medicines containing Acetaminophen are currently prescribed, maintain daily dose at 3000 mg.    4.  If they can tolerate NSAIDS, it is recommended to take Ibuprofen 600 mg po q 6 hours for 7 days during pain exacerbations.  Alternatively, they may substitute an NSAID of their choice (e.g. Aleve).  This may be taken at the same time as Acetaminophen.  5.  Heat and ice to the affected area as tolerated for pain control.  It was discussed that heating pads can cause burns.  6.  Low impact exercise such as walking or water exercise was recommended to maintain overall health and aid in weight control.   7.  Follow up as needed for subsequent injections.  8.  Patient was counseled to abstain from tobacco products.

## 2018-01-08 NOTE — H&P
[facility]    History and Physical    Patient Name: Eugene Shabazz  :  1970  MRN:  [id number]  Date of Admission: 2018    Subjective     Patient is a 47 y.o. male presents with chief complaint of chronic, mild, moderate neck and shoulder: bilateral pain.  Onset of symptoms was abrupt starting 8 months ago.  Symptoms are associated/aggravated by extending neck while at the computer or sleeping. Symptoms improve with OTC meds.. His MRI showed C5-6 and C6-7 Degeneration and foraminal stenosis per Dr Garibay.    The following portions of the patients history were reviewed and updated as appropriate: current medications, allergies, past medical history, past surgical history, past family history, past social history and problem list                Objective     Past Medical History:   Past Medical History:   Diagnosis Date   • Hyperlipidemia    • Injury of back     2 lumbar surgeries, lamiectomies, most recent 5 years ago - good resolution      Past Surgical History:   Past Surgical History:   Procedure Laterality Date   • BACK SURGERY     • HERNIA REPAIR       Family History:   Family History   Problem Relation Age of Onset   • No Known Problems Mother    • Heart disease Father      Social History:   Social History   Substance Use Topics   • Smoking status: Never Smoker   • Smokeless tobacco: Never Used   • Alcohol use Yes       Vital Signs Range for the last 24 hours  Temperature:     Temp Source:     BP:     Pulse:     Respirations:     SPO2:     O2 Amount (l/min):     O2 Devices     Weight:           --------------------------------------------------------------------------------    Current Outpatient Prescriptions   Medication Sig Dispense Refill   • atorvastatin (LIPITOR) 40 MG tablet TAKE ONE TABLET BY MOUTH DAILY 30 tablet 1     No current facility-administered medications for this encounter.         --------------------------------------------------------------------------------  Assessment/Plan      Anesthesia Evaluation            Airway   Mallampati: II  Dental - normal exam     Pulmonary     breath sounds clear to auscultation  Cardiovascular     Rate: normal        Neuro/Psych  normal reflexes and symmetric    PE Comment: Equal  strength.  GI/Hepatic/Renal/Endo      Musculoskeletal     Abdominal  - normal exam   Substance History      OB/GYN          Other                                           Diagnosis and Plan    Treatment Plan  ASA 2      Procedures: Cervical Epidural Steroid Injection(KAY), With fluoroscopy,       Anesthetic plan and risks discussed with patient.          Diagnosis     * Foraminal stenosis of cervical region [M99.81]     * Degenerative cervical disc [M50.30]

## 2018-01-09 ENCOUNTER — TRANSCRIBE ORDERS (OUTPATIENT)
Dept: PAIN MEDICINE | Facility: HOSPITAL | Age: 48
End: 2018-01-09

## 2018-01-09 DIAGNOSIS — M47.22 OSTEOARTHRITIS OF SPINE WITH RADICULOPATHY, CERVICAL REGION: Primary | ICD-10-CM

## 2018-01-17 RX ORDER — ATORVASTATIN CALCIUM 40 MG/1
TABLET, FILM COATED ORAL
Qty: 30 TABLET | Refills: 5 | Status: SHIPPED | OUTPATIENT
Start: 2018-01-17 | End: 2018-07-22 | Stop reason: SDUPTHER

## 2018-01-22 ENCOUNTER — ANESTHESIA (OUTPATIENT)
Dept: PAIN MEDICINE | Facility: HOSPITAL | Age: 48
End: 2018-01-22

## 2018-01-22 ENCOUNTER — ANESTHESIA EVENT (OUTPATIENT)
Dept: PAIN MEDICINE | Facility: HOSPITAL | Age: 48
End: 2018-01-22

## 2018-01-22 ENCOUNTER — HOSPITAL ENCOUNTER (OUTPATIENT)
Dept: GENERAL RADIOLOGY | Facility: HOSPITAL | Age: 48
Discharge: HOME | End: 2018-01-22

## 2018-01-22 ENCOUNTER — HOSPITAL ENCOUNTER (OUTPATIENT)
Dept: PAIN MEDICINE | Facility: HOSPITAL | Age: 48
Discharge: HOME | End: 2018-01-22
Attending: ORTHOPAEDIC SURGERY | Admitting: ORTHOPAEDIC SURGERY

## 2018-01-22 VITALS
HEART RATE: 72 BPM | RESPIRATION RATE: 16 BRPM | TEMPERATURE: 98.4 F | SYSTOLIC BLOOD PRESSURE: 143 MMHG | HEIGHT: 69 IN | BODY MASS INDEX: 30.96 KG/M2 | WEIGHT: 209 LBS | OXYGEN SATURATION: 96 % | DIASTOLIC BLOOD PRESSURE: 98 MMHG

## 2018-01-22 DIAGNOSIS — R52 PAIN: ICD-10-CM

## 2018-01-22 DIAGNOSIS — M47.22 OSTEOARTHRITIS OF SPINE WITH RADICULOPATHY, CERVICAL REGION: ICD-10-CM

## 2018-01-22 PROCEDURE — C1755 CATHETER, INTRASPINAL: HCPCS

## 2018-01-22 PROCEDURE — 77003 FLUOROGUIDE FOR SPINE INJECT: CPT

## 2018-01-22 PROCEDURE — 25010000002 METHYLPREDNISOLONE PER 80 MG: Performed by: ANESTHESIOLOGY

## 2018-01-22 RX ORDER — SODIUM CHLORIDE 0.9 % (FLUSH) 0.9 %
1-10 SYRINGE (ML) INJECTION AS NEEDED
Status: DISCONTINUED | OUTPATIENT
Start: 2018-01-22 | End: 2018-01-23 | Stop reason: HOSPADM

## 2018-01-22 RX ORDER — FENTANYL CITRATE 50 UG/ML
50 INJECTION, SOLUTION INTRAMUSCULAR; INTRAVENOUS AS NEEDED
Status: DISCONTINUED | OUTPATIENT
Start: 2018-01-22 | End: 2018-01-23 | Stop reason: HOSPADM

## 2018-01-22 RX ORDER — MIDAZOLAM HYDROCHLORIDE 1 MG/ML
1 INJECTION INTRAMUSCULAR; INTRAVENOUS AS NEEDED
Status: DISCONTINUED | OUTPATIENT
Start: 2018-01-22 | End: 2018-01-23 | Stop reason: HOSPADM

## 2018-01-22 RX ORDER — METHYLPREDNISOLONE ACETATE 80 MG/ML
80 INJECTION, SUSPENSION INTRA-ARTICULAR; INTRALESIONAL; INTRAMUSCULAR; SOFT TISSUE ONCE
Status: COMPLETED | OUTPATIENT
Start: 2018-01-22 | End: 2018-01-22

## 2018-01-22 RX ORDER — LIDOCAINE HYDROCHLORIDE 10 MG/ML
1 INJECTION, SOLUTION INFILTRATION; PERINEURAL ONCE AS NEEDED
Status: DISCONTINUED | OUTPATIENT
Start: 2018-01-22 | End: 2018-01-23 | Stop reason: HOSPADM

## 2018-01-22 RX ADMIN — METHYLPREDNISOLONE ACETATE 80 MG: 80 INJECTION, SUSPENSION INTRA-ARTICULAR; INTRALESIONAL; INTRAMUSCULAR; SOFT TISSUE at 08:04

## 2018-01-22 NOTE — H&P (VIEW-ONLY)
[facility]    History and Physical    Patient Name: Eugene Shabazz  :  1970  MRN:  [id number]  Date of Admission: 2018    Subjective     Patient is a 47 y.o. male presents with chief complaint of chronic, mild, moderate neck and shoulder: bilateral pain.  Onset of symptoms was abrupt starting 8 months ago.  Symptoms are associated/aggravated by extending neck while at the computer or sleeping. Symptoms improve with OTC meds.. His MRI showed C5-6 and C6-7 Degeneration and foraminal stenosis per Dr Garibay.    The following portions of the patients history were reviewed and updated as appropriate: current medications, allergies, past medical history, past surgical history, past family history, past social history and problem list                Objective     Past Medical History:   Past Medical History:   Diagnosis Date   • Hyperlipidemia    • Injury of back     2 lumbar surgeries, lamiectomies, most recent 5 years ago - good resolution      Past Surgical History:   Past Surgical History:   Procedure Laterality Date   • BACK SURGERY     • HERNIA REPAIR       Family History:   Family History   Problem Relation Age of Onset   • No Known Problems Mother    • Heart disease Father      Social History:   Social History   Substance Use Topics   • Smoking status: Never Smoker   • Smokeless tobacco: Never Used   • Alcohol use Yes       Vital Signs Range for the last 24 hours  Temperature:     Temp Source:     BP:     Pulse:     Respirations:     SPO2:     O2 Amount (l/min):     O2 Devices     Weight:           --------------------------------------------------------------------------------    Current Outpatient Prescriptions   Medication Sig Dispense Refill   • atorvastatin (LIPITOR) 40 MG tablet TAKE ONE TABLET BY MOUTH DAILY 30 tablet 1     No current facility-administered medications for this encounter.         --------------------------------------------------------------------------------  Assessment/Plan      Anesthesia Evaluation            Airway   Mallampati: II  Dental - normal exam     Pulmonary     breath sounds clear to auscultation  Cardiovascular     Rate: normal        Neuro/Psych  normal reflexes and symmetric    PE Comment: Equal  strength.  GI/Hepatic/Renal/Endo      Musculoskeletal     Abdominal  - normal exam   Substance History      OB/GYN          Other                                           Diagnosis and Plan    Treatment Plan  ASA 2      Procedures: Cervical Epidural Steroid Injection(KAY), With fluoroscopy,       Anesthetic plan and risks discussed with patient.          Diagnosis     * Foraminal stenosis of cervical region [M99.81]     * Degenerative cervical disc [M50.30]

## 2018-01-22 NOTE — PLAN OF CARE
Problem: Pain, Chronic (Adult)  Goal: Acceptable Pain Control/Comfort Level  Outcome: Unable to achieve outcome(s) by discharge Date Met: 01/22/18

## 2018-01-22 NOTE — INTERVAL H&P NOTE
[facility]  H&P reviewed. No changes since last visit.  Patient states   50-75% improvement since the last procedure/injection.    Diagnosis     * Degenerative disc disease, cervical [M50.30]     * Foraminal stenosis of cervical region [M99.81]      Airway assessed since last visit. Airway class equals: 2.

## 2018-01-22 NOTE — ANESTHESIA PROCEDURE NOTES
PAIN Epidural block    Patient location during procedure: pain clinic  Indication:procedure for pain  Performed By  Anesthesiologist: GABRIEL ADAM  Preanesthetic Checklist  Completed: patient identified, site marked, surgical consent, pre-op evaluation, timeout performed, IV checked, risks and benefits discussed and monitors and equipment checked  Additional Notes  Post-Op Diagnosis Codes:     * Degenerative disc disease, cervical (M50.30)     * Foraminal stenosis of cervical region (M99.81)  Performed under fluoroscopic guidance.  Prep:  Pt Position:prone  Sterile Tech:cap, gloves, mask and sterile barrier  Prep:chlorhexidine gluconate and isopropyl alcohol  Monitoring:blood pressure monitoring, continuous pulse oximetry and EKG  Procedure:  Sedation: no   Approach:midline  Guidance: fluoroscopy  Location:cervical (Intralaminar)  Level:6-7  Needle Type:Tuohy  Needle Gauge:20  Aspiration:negative  Medications:  Depomedrol:80  Preservative Free Saline:2mL    Post Assessment:  Post-procedure: Band-Aid.  Pt Tolerance:patient tolerated the procedure well with no apparent complications  Complications:no

## 2018-01-22 NOTE — PLAN OF CARE
Problem: Pain, Chronic (Adult)  Goal: Identify Related Risk Factors and Signs and Symptoms  Outcome: Unable to achieve outcome(s) by discharge Date Met: 01/22/18

## 2018-03-12 ENCOUNTER — OFFICE VISIT (OUTPATIENT)
Dept: FAMILY MEDICINE CLINIC | Facility: CLINIC | Age: 48
End: 2018-03-12

## 2018-03-12 VITALS
SYSTOLIC BLOOD PRESSURE: 132 MMHG | HEART RATE: 74 BPM | RESPIRATION RATE: 18 BRPM | BODY MASS INDEX: 31.4 KG/M2 | WEIGHT: 212 LBS | DIASTOLIC BLOOD PRESSURE: 90 MMHG | HEIGHT: 69 IN

## 2018-03-12 DIAGNOSIS — L91.8 SKIN TAGS, MULTIPLE ACQUIRED: Primary | ICD-10-CM

## 2018-03-12 PROCEDURE — 99212 OFFICE O/P EST SF 10 MIN: CPT | Performed by: INTERNAL MEDICINE

## 2018-03-12 PROCEDURE — 11200 RMVL SKIN TAGS UP TO&INC 15: CPT | Performed by: INTERNAL MEDICINE

## 2018-03-12 NOTE — PROGRESS NOTES
Subjective   Eugene Shabazz is a 48 y.o. male. Patient is here today for   Chief Complaint   Patient presents with   • Mole removal          Vitals:    03/12/18 0903   BP: 132/90   Pulse: 74   Resp: 18     The following portions of the patient's history were reviewed and updated as appropriate: allergies, current medications, past family history, past medical history, past social history, past surgical history and problem list.    Past Medical History:   Diagnosis Date   • Hyperlipidemia    • Injury of back     2 lumbar surgeries, lamiectomies, most recent 5 years ago - good resolution    • Narcolepsy    • Neck pain    • Peripheral neuropathy       No Known Allergies   Social History     Social History   • Marital status: Unknown     Spouse name: N/A   • Number of children: N/A   • Years of education: N/A     Occupational History   • Not on file.     Social History Main Topics   • Smoking status: Never Smoker   • Smokeless tobacco: Never Used   • Alcohol use Yes   • Drug use: No   • Sexual activity: Defer     Other Topics Concern   • Not on file     Social History Narrative   • No narrative on file        Current Outpatient Prescriptions:   •  atorvastatin (LIPITOR) 40 MG tablet, TAKE ONE TABLET BY MOUTH DAILY, Disp: 30 tablet, Rfl: 5  •  ibuprofen (ADVIL,MOTRIN) 800 MG tablet, Take 800 mg by mouth Every 6 (Six) Hours As Needed for Mild Pain ., Disp: , Rfl:      Objective     History of Present Illness Eugene complains of multiple skin lesions on his left neck and left back and left arm.    Review of Systems   Constitutional: Negative.    Skin: Negative for color change.       Physical Exam   Constitutional: He appears well-developed and well-nourished.   Skin:   There are 2 small skin tags on the left neck.  There is a 1 cm skin tag on the left back.  There is a warty 3 mm lesion on left forearm   Vitals reviewed.      ASSESSMENT     Problem List Items Addressed This Visit        Musculoskeletal and Integument     Skin tags, multiple acquired - Primary      Other Visit Diagnoses    None.         PLAN  Patient Instructions   The 4 skin lesions were frozen with liquid nitrogen.    Return for Next scheduled follow up.

## 2018-04-20 ENCOUNTER — TRANSCRIBE ORDERS (OUTPATIENT)
Dept: PAIN MEDICINE | Facility: HOSPITAL | Age: 48
End: 2018-04-20

## 2018-04-20 DIAGNOSIS — M47.22 OSTEOARTHRITIS OF SPINE WITH RADICULOPATHY, CERVICAL REGION: Primary | ICD-10-CM

## 2018-04-26 ENCOUNTER — ANESTHESIA (OUTPATIENT)
Dept: PAIN MEDICINE | Facility: HOSPITAL | Age: 48
End: 2018-04-26

## 2018-04-26 ENCOUNTER — ANESTHESIA EVENT (OUTPATIENT)
Dept: PAIN MEDICINE | Facility: HOSPITAL | Age: 48
End: 2018-04-26

## 2018-04-26 ENCOUNTER — HOSPITAL ENCOUNTER (OUTPATIENT)
Dept: GENERAL RADIOLOGY | Facility: HOSPITAL | Age: 48
Discharge: HOME | End: 2018-04-26

## 2018-04-26 ENCOUNTER — HOSPITAL ENCOUNTER (OUTPATIENT)
Dept: PAIN MEDICINE | Facility: HOSPITAL | Age: 48
Discharge: HOME | End: 2018-04-26
Admitting: ORTHOPAEDIC SURGERY

## 2018-04-26 VITALS
BODY MASS INDEX: 33.59 KG/M2 | HEIGHT: 66 IN | SYSTOLIC BLOOD PRESSURE: 142 MMHG | WEIGHT: 209 LBS | OXYGEN SATURATION: 98 % | HEART RATE: 70 BPM | TEMPERATURE: 97.8 F | RESPIRATION RATE: 16 BRPM | DIASTOLIC BLOOD PRESSURE: 97 MMHG

## 2018-04-26 DIAGNOSIS — R52 PAIN: ICD-10-CM

## 2018-04-26 DIAGNOSIS — M47.22 OSTEOARTHRITIS OF SPINE WITH RADICULOPATHY, CERVICAL REGION: ICD-10-CM

## 2018-04-26 PROCEDURE — 77003 FLUOROGUIDE FOR SPINE INJECT: CPT

## 2018-04-26 PROCEDURE — 25010000002 DEXAMETHASONE PER 1 MG: Performed by: ANESTHESIOLOGY

## 2018-04-26 PROCEDURE — C1755 CATHETER, INTRASPINAL: HCPCS

## 2018-04-26 RX ORDER — LIDOCAINE HYDROCHLORIDE 10 MG/ML
1 INJECTION, SOLUTION INFILTRATION; PERINEURAL ONCE AS NEEDED
Status: DISCONTINUED | OUTPATIENT
Start: 2018-04-26 | End: 2018-04-27 | Stop reason: HOSPADM

## 2018-04-26 RX ORDER — FENTANYL CITRATE 50 UG/ML
50 INJECTION, SOLUTION INTRAMUSCULAR; INTRAVENOUS AS NEEDED
Status: DISCONTINUED | OUTPATIENT
Start: 2018-04-26 | End: 2018-04-27 | Stop reason: HOSPADM

## 2018-04-26 RX ORDER — SODIUM CHLORIDE 0.9 % (FLUSH) 0.9 %
1-10 SYRINGE (ML) INJECTION AS NEEDED
Status: DISCONTINUED | OUTPATIENT
Start: 2018-04-26 | End: 2018-04-27 | Stop reason: HOSPADM

## 2018-04-26 RX ORDER — DEXAMETHASONE SODIUM PHOSPHATE 10 MG/ML
10 INJECTION INTRAMUSCULAR; INTRAVENOUS ONCE
Status: COMPLETED | OUTPATIENT
Start: 2018-04-26 | End: 2018-04-26

## 2018-04-26 RX ORDER — MIDAZOLAM HYDROCHLORIDE 1 MG/ML
1 INJECTION INTRAMUSCULAR; INTRAVENOUS AS NEEDED
Status: DISCONTINUED | OUTPATIENT
Start: 2018-04-26 | End: 2018-04-27 | Stop reason: HOSPADM

## 2018-04-26 RX ORDER — METHYLPREDNISOLONE ACETATE 80 MG/ML
80 INJECTION, SUSPENSION INTRA-ARTICULAR; INTRALESIONAL; INTRAMUSCULAR; SOFT TISSUE ONCE
Status: DISCONTINUED | OUTPATIENT
Start: 2018-04-26 | End: 2018-04-27 | Stop reason: HOSPADM

## 2018-04-26 RX ADMIN — DEXAMETHASONE SODIUM PHOSPHATE 10 MG: 10 INJECTION, SOLUTION INTRAMUSCULAR; INTRAVENOUS at 09:37

## 2018-04-26 NOTE — ANESTHESIA PROCEDURE NOTES
PAIN Epidural block    Patient location during procedure: pain clinic  Start Time: 4/26/2018 9:32 AM  Stop Time: 4/26/2018 9:37 AM  Indication:procedure for pain  Performed By  Anesthesiologist: SUE SCHMITZ  Preanesthetic Checklist  Completed: patient identified, surgical consent, pre-op evaluation, timeout performed, IV checked, risks and benefits discussed and monitors and equipment checked  Additional Notes  Diagnosis:  Post-Op Diagnosis Codes:     * Cervical spinal stenosis (M48.02)     * Degeneration of cervical intervertebral disc (M50.30)     * Cervical radiculopathy (M54.12)      Prep:  Pt Position:prone  Sterile Tech:gloves, mask and sterile barrier  Prep:chlorhexidine gluconate and isopropyl alcohol  Monitoring:blood pressure monitoring, continuous pulse oximetry and EKG  Procedure:  Sedation: no   Approach:midline  Guidance: fluoroscopy  Location:cervical  Level:6-7  Needle Type:Tuohy  Needle Gauge:20  Aspiration:negative  Test Dose:negative  Medications:  Analgesia: Dexamethasone 10mg.  Preservative Free Saline:3mL  Comments:No dye  Post Assessment:  Dressing:occlusive dressing applied  Pt Tolerance:patient tolerated the procedure well with no apparent complications  Complications:no

## 2018-05-03 DIAGNOSIS — E78.5 HYPERLIPIDEMIA, UNSPECIFIED HYPERLIPIDEMIA TYPE: Primary | ICD-10-CM

## 2018-05-10 LAB
ALBUMIN SERPL-MCNC: 4.8 G/DL (ref 3.5–5.2)
ALBUMIN/GLOB SERPL: 3.4 G/DL
ALP SERPL-CCNC: 61 U/L (ref 39–117)
ALT SERPL-CCNC: 34 U/L (ref 1–41)
AST SERPL-CCNC: 23 U/L (ref 1–40)
BASOPHILS # BLD AUTO: 0.03 10*3/MM3 (ref 0–0.2)
BASOPHILS NFR BLD AUTO: 0.5 % (ref 0–1.5)
BILIRUB SERPL-MCNC: 0.9 MG/DL (ref 0.1–1.2)
BUN SERPL-MCNC: 15 MG/DL (ref 6–20)
BUN/CREAT SERPL: 14.3 (ref 7–25)
CALCIUM SERPL-MCNC: 10 MG/DL (ref 8.6–10.5)
CHLORIDE SERPL-SCNC: 100 MMOL/L (ref 98–107)
CHOLEST SERPL-MCNC: 128 MG/DL (ref 0–200)
CO2 SERPL-SCNC: 28.4 MMOL/L (ref 22–29)
CREAT SERPL-MCNC: 1.05 MG/DL (ref 0.76–1.27)
EOSINOPHIL # BLD AUTO: 0.05 10*3/MM3 (ref 0–0.7)
EOSINOPHIL NFR BLD AUTO: 0.8 % (ref 0.3–6.2)
ERYTHROCYTE [DISTWIDTH] IN BLOOD BY AUTOMATED COUNT: 13.6 % (ref 11.5–14.5)
GFR SERPLBLD CREATININE-BSD FMLA CKD-EPI: 76 ML/MIN/1.73
GFR SERPLBLD CREATININE-BSD FMLA CKD-EPI: 92 ML/MIN/1.73
GLOBULIN SER CALC-MCNC: 1.4 GM/DL
GLUCOSE SERPL-MCNC: 101 MG/DL (ref 65–99)
HCT VFR BLD AUTO: 52 % (ref 40.4–52.2)
HDLC SERPL-MCNC: 31 MG/DL (ref 40–60)
HGB BLD-MCNC: 17 G/DL (ref 13.7–17.6)
IMM GRANULOCYTES # BLD: 0.02 10*3/MM3 (ref 0–0.03)
IMM GRANULOCYTES NFR BLD: 0.3 % (ref 0–0.5)
LDLC SERPL CALC-MCNC: 58 MG/DL (ref 0–100)
LDLC/HDLC SERPL: 1.88 {RATIO}
LYMPHOCYTES # BLD AUTO: 1.86 10*3/MM3 (ref 0.9–4.8)
LYMPHOCYTES NFR BLD AUTO: 28.9 % (ref 19.6–45.3)
MCH RBC QN AUTO: 32.1 PG (ref 27–32.7)
MCHC RBC AUTO-ENTMCNC: 32.7 G/DL (ref 32.6–36.4)
MCV RBC AUTO: 98.3 FL (ref 79.8–96.2)
MONOCYTES # BLD AUTO: 0.43 10*3/MM3 (ref 0.2–1.2)
MONOCYTES NFR BLD AUTO: 6.7 % (ref 5–12)
NEUTROPHILS # BLD AUTO: 4.07 10*3/MM3 (ref 1.9–8.1)
NEUTROPHILS NFR BLD AUTO: 63.1 % (ref 42.7–76)
PLATELET # BLD AUTO: 188 10*3/MM3 (ref 140–500)
POTASSIUM SERPL-SCNC: 5 MMOL/L (ref 3.5–5.2)
PROT SERPL-MCNC: 6.2 G/DL (ref 6–8.5)
RBC # BLD AUTO: 5.29 10*6/MM3 (ref 4.6–6)
SODIUM SERPL-SCNC: 142 MMOL/L (ref 136–145)
TRIGL SERPL-MCNC: 194 MG/DL (ref 0–150)
VLDLC SERPL CALC-MCNC: 38.8 MG/DL (ref 5–40)
WBC # BLD AUTO: 6.44 10*3/MM3 (ref 4.5–10.7)

## 2018-07-03 ENCOUNTER — TELEPHONE (OUTPATIENT)
Dept: ORTHOPEDIC SURGERY | Facility: CLINIC | Age: 48
End: 2018-07-03

## 2018-07-03 DIAGNOSIS — M47.22 OSTEOARTHRITIS OF SPINE WITH RADICULOPATHY, CERVICAL REGION: Primary | ICD-10-CM

## 2018-07-09 NOTE — TELEPHONE ENCOUNTER
Called patient and let him know what MILAN said in regards to the patients concerns about getting more KAY's ordered.    Patient would like to go ahead and get more epidurals scheduled but he would also like to get an appt with MILAN as well.  I have put a message in the to the phones dept to give this patient a call back to get him scheduled with MILAN.

## 2018-07-09 NOTE — TELEPHONE ENCOUNTER
If the epidurals aren't lasting longer than this and I think surgery should be considered, but it safe to repeat the epidurals if he prefers that.

## 2018-07-23 RX ORDER — ATORVASTATIN CALCIUM 40 MG/1
TABLET, FILM COATED ORAL
Qty: 30 TABLET | Refills: 0 | Status: SHIPPED | OUTPATIENT
Start: 2018-07-23 | End: 2018-08-29 | Stop reason: SDUPTHER

## 2018-07-25 ENCOUNTER — OFFICE VISIT (OUTPATIENT)
Dept: ORTHOPEDIC SURGERY | Facility: CLINIC | Age: 48
End: 2018-07-25

## 2018-07-25 DIAGNOSIS — M47.22 CERVICAL SPONDYLOSIS WITH RADICULOPATHY: Primary | ICD-10-CM

## 2018-07-25 PROCEDURE — 99213 OFFICE O/P EST LOW 20 MIN: CPT | Performed by: ORTHOPAEDIC SURGERY

## 2018-07-25 NOTE — PROGRESS NOTES
He's had a series of epidural since I last saw him in starting a new series soon.  He is complaining of neck and arm pain but got excellent relief from the epidurals.  On exam he has intact neurologic function in the upper extremities bilaterally.  No new x-rays today.  He has some stenosis at the 2 levels is confirmed by MRI at that he seems to be getting along nicely enough he'll continue epidurals and see me back as needed.

## 2018-07-26 ENCOUNTER — HOSPITAL ENCOUNTER (OUTPATIENT)
Dept: PAIN MEDICINE | Facility: HOSPITAL | Age: 48
Discharge: HOME | End: 2018-07-26
Attending: ORTHOPAEDIC SURGERY | Admitting: ANESTHESIOLOGY

## 2018-07-26 ENCOUNTER — ANESTHESIA (OUTPATIENT)
Dept: PAIN MEDICINE | Facility: HOSPITAL | Age: 48
End: 2018-07-26

## 2018-07-26 ENCOUNTER — ANESTHESIA EVENT (OUTPATIENT)
Dept: PAIN MEDICINE | Facility: HOSPITAL | Age: 48
End: 2018-07-26

## 2018-07-26 ENCOUNTER — HOSPITAL ENCOUNTER (OUTPATIENT)
Dept: GENERAL RADIOLOGY | Facility: HOSPITAL | Age: 48
Discharge: HOME | End: 2018-07-26

## 2018-07-26 VITALS
HEIGHT: 69 IN | TEMPERATURE: 98 F | DIASTOLIC BLOOD PRESSURE: 90 MMHG | OXYGEN SATURATION: 95 % | HEART RATE: 81 BPM | RESPIRATION RATE: 16 BRPM | WEIGHT: 209 LBS | BODY MASS INDEX: 30.96 KG/M2 | SYSTOLIC BLOOD PRESSURE: 147 MMHG

## 2018-07-26 DIAGNOSIS — M47.22 OSTEOARTHRITIS OF SPINE WITH RADICULOPATHY, CERVICAL REGION: ICD-10-CM

## 2018-07-26 DIAGNOSIS — M54.2 CERVICAL SPINE PAIN: ICD-10-CM

## 2018-07-26 PROCEDURE — C1755 CATHETER, INTRASPINAL: HCPCS

## 2018-07-26 PROCEDURE — 77003 FLUOROGUIDE FOR SPINE INJECT: CPT

## 2018-07-26 PROCEDURE — 25010000002 DEXAMETHASONE SODIUM PHOSPHATE 10 MG/ML SOLUTION: Performed by: ANESTHESIOLOGY

## 2018-07-26 RX ORDER — DEXAMETHASONE SODIUM PHOSPHATE 10 MG/ML
10 INJECTION, SOLUTION INTRAMUSCULAR; INTRAVENOUS ONCE
Status: COMPLETED | OUTPATIENT
Start: 2018-07-26 | End: 2018-07-26

## 2018-07-26 RX ADMIN — DEXAMETHASONE SODIUM PHOSPHATE 10 MG: 10 INJECTION, SOLUTION INTRAMUSCULAR; INTRAVENOUS at 09:46

## 2018-07-26 NOTE — DISCHARGE INSTRUCTIONS
"Guide To Relieving And Avoiding Neck Pain    Exercise is important to help prevent and treat neck pain.  Good posture, exercise and avoiding injury will help to keep your neck healthy.    When the neck is strained or over worked symptoms may include headache, upper back pain, shoulder pain or arm pain.  Numbness or tingling in the fingers, dizziness or nausea may also occur.    Posture:    Avoid slumping over a desk.  Raise your work (including computer) to eye level to avoid bending at the neck.      Change Position Often:  Changing position prevents overuse of particular muscles.    Sleep On One Pillow:  Using to many pillows or to large of a pillow causes a \"kink\" in you neck.      Move and Exercise:  Living an active lifestyle is an important part of staying healthy.  Be sure to include the exercise to follow specifically for your neck.                    Range of Motion Exercises:  Do these exercises three times a day.  If you experience increased pain stop and contact your physician.  All exercises can be performed sitting or standing.        1.    2.   3.    1.  Place both hands behind you neck.  Gently tilt your neck backward so that you are looking at the ceiling.  Hold for a count of 10.    2.  Look straight facing forward.  Slowly tip your ear toward your right shoulder.  Do not force the motion.  Hold for a count of 10.  Bring head back to starting position and repeat to left side.    3.  Look straight facing forward.  Gently turn your head to the right.  Do not force the motion.  Hold for a count of 10.  Bring head back to starting position and repeat to the left side.    Exercises To Strengthen Muscles:  1.  Look straight facing forward.  Relax your shoulders.  Raise both shoulders toward your ears.  Hold for 3 seconds.       1.       2.   3.      1.  Look straight facing forward.  Relax your shoulders.  Raise both shoulders toward     2.  Raise your ars to your side and bend your elbows.  Squeeze " shoulder blades together as you rotate your arms outward.  Hold for 5 seconds.    3.  Look straight facing forward.  Pull your head straight back.  Do not tip or move your jaw.  Hold for 5 seconds.Cervical epidural steroid injection instructions  Plan includes:  1.  Cervical epidural steroid injections, up to 3, spaced 1-2 weeks apart.  If pain control is acceptable after 1 or 2 injections, it was discussed with the patient that they may return for the subsequent injections if and when their pain returns.  The risks were discussed with the patient including failure of relief, worsening pain, Headache (post dural puncture headache), bleeding (epidural hematoma) and infection (epidural abscess or skin infection).  2.  Physical therapy exercises at home as prescribed by physical therapy or from the pain clinic handout (given to the patient).  Continuation of these exercises every day, or multiple times per week, even when the patient has good pain relief, was stressed to the patient as a preventative measure to decrease the frequency and severity of future pain episodes.  3.  Continue pain medicines as already prescribed.  If patient not currently taking any, it is recommended to begin Acetaminophen 1000 mg po q 8 hours.  If other medicines containing Acetaminophen are currently prescribed, maintain daily dose at 3000 mg.    4.  If they can tolerate NSAIDS, it is recommended to take Ibuprofen 600 mg po q 6 hours for 7 days during pain exacerbations.  Alternatively, they may substitute an NSAID of their choice (e.g. Aleve).  This may be taken at the same time as Acetaminophen.  5.  Heat and ice to the affected area as tolerated for pain control.  It was discussed that heating pads can cause burns.  6.  Low impact exercise such as walking or water exercise was recommended to maintain overall health and aid in weight control.   7.  Follow up as needed for subsequent injections.  8.  Patient was counseled to abstain from  tobacco products.

## 2018-07-26 NOTE — ANESTHESIA PROCEDURE NOTES
PAIN Epidural block    Patient location during procedure: pain clinic  Start Time: 7/26/2018 9:43 AM  Stop Time: 7/26/2018 9:51 AM  Indication:at surgeon's request and procedure for pain  Performed By  Anesthesiologist: RACHEL CHAN RAY  Preanesthetic Checklist  Completed: patient identified, site marked, surgical consent, pre-op evaluation, timeout performed, risks and benefits discussed and monitors and equipment checked  Additional Notes  Post-Op Diagnosis Codes:     * Cervical spine degeneration (M47.812)     * Cervical neuritis (M54.12)    Prep:  Pt Position:prone  Sterile Tech:sterile barrier, mask and gloves  Prep:chlorhexidine gluconate and isopropyl alcohol  Monitoring:blood pressure monitoring, continuous pulse oximetry and EKG  Procedure:  Sedation: no   Approach:midline  Guidance: fluoroscopy  Location:cervical  Level:4-5  Needle Gauge:20 G  Aspiration:negative  Test Dose:negative  Medications:  Analgesia: dexamthasone 10 mg.  Comments:Cervical epidural steroid injection was performed at the C4 5 level.  Lateral fluoroscopy was used to place the needle in the interspinous ligament.  C4 5 level was the most caudal level I was able to adequately visualize in the lateral plane and therefore was chosen.  I slowly advanced the needle with intermittent lateral fluoroscopy until there was a loss resistance to injection.  There was good loss resistance to injection of red blood cells or cerebral spinal fluid.  There is no pain with injection.  10 mg of Decadron were slowly injected without exacerbation of symptoms and the needle was withdrawn.  He tolerated procedure well.  Post Assessment:  Pt Tolerance:patient tolerated the procedure well with no apparent complications  Complications:no

## 2018-07-26 NOTE — H&P
[facility]    History and Physical    Patient Name: Eugene Shabazz  :  1970  MRN:  [id number]  Date of Admission: 2018    Subjective     Patient is a 48 y.o. male presents with chief complaint of chronic neck and shoulder: left pain.  Onset of symptoms was gradual starting 1 year ago.  Symptoms are associated/aggravated by nothing in particular or extending neck. Symptoms improve with injection  He reports pain onset approximately a year ago.  There is no specific event.  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  She had 3 previous epidural steroid injections all of which were very helpful.  Most recently gave him about 75% relief until just about a week ago.  He's had many months of relief he finds this to be quite helpful.   He reports pain in his neck that radiates into his left arm some he has more of a sensation of numbness and tingling in the left hand primarily in the fourth and fifth fingers.   He is currently rating his pain is 3 out of 10 however he gets worse.  He does not smoke.  He has hypertension sleep apnea.  The following portions of the patients history were reviewed and updated as appropriate: current medications, allergies, past medical history, past surgical history, past family history, past social history and problem list                Objective     Past Medical History:   Past Medical History:   Diagnosis Date   • Hyperlipidemia    • Injury of back     2 lumbar surgeries, lamiectomies, most recent 5 years ago - good resolution    • Narcolepsy    • Neck pain    • Peripheral neuropathy      Past Surgical History:   Past Surgical History:   Procedure Laterality Date   • BACK SURGERY      lumbar x2   • HERNIA REPAIR       Family History:   Family History   Problem Relation Age of Onset   • No Known Problems Mother    • Heart disease Father      Social History:   Social History   Substance Use Topics   • Smoking status: Never Smoker   • Smokeless tobacco:  "Never Used   • Alcohol use Yes       Vital Signs Range for the last 24 hours  Temperature: Temp:  [36.7 °C (98 °F)] 36.7 °C (98 °F)   Temp Source: Temp src: Oral   BP: BP: (139)/(95) 139/95   Pulse: Heart Rate:  [64] 64   Respirations: Resp:  [16] 16   SPO2: SpO2:  [97 %] 97 %   O2 Amount (l/min):     O2 Devices Device (Oxygen Therapy): room air   Weight: Weight:  [94.8 kg (209 lb)] 94.8 kg (209 lb)     Flowsheet Rows      First Filed Value   Admission Height  175.3 cm (69\") Documented at 07/26/2018 0909   Admission Weight  94.8 kg (209 lb) Documented at 07/26/2018 0909          --------------------------------------------------------------------------------    Current Outpatient Prescriptions   Medication Sig Dispense Refill   • atorvastatin (LIPITOR) 40 MG tablet TAKE ONE TABLET BY MOUTH DAILY 30 tablet 0   • ibuprofen (ADVIL,MOTRIN) 800 MG tablet Take 800 mg by mouth Every 6 (Six) Hours As Needed for Mild Pain .       No current facility-administered medications for this encounter.        --------------------------------------------------------------------------------  Assessment/Plan      Anesthesia Evaluation                  Airway   Mallampati: II  Dental      Pulmonary - normal exam   (+) sleep apnea,   Cardiovascular     (+) hyperlipidemia,   (-) murmur      Neuro/Psych  (+) numbness,     GI/Hepatic/Renal/Endo      Musculoskeletal     (+) neck pain,       PE comment: I'm not able to appreciate any obvious weakness in the left upper extremity.   strength feels fairly strong.  He subjectively feels like his left hand may be slightly weaker.  Abdominal    Substance History      OB/GYN          Other                   Diagnosis and Plan    Treatment Plan  ASA 2   Patient has had previous injection/procedure with % improvement.   Procedures: Cervical Epidural Steroid Injection(KAY), With fluoroscopy,               Diagnosis     * Cervical spine degeneration [M47.812]     * Cervical neuritis [M54.12]   "

## 2018-08-20 RX ORDER — ATORVASTATIN CALCIUM 40 MG/1
TABLET, FILM COATED ORAL
Qty: 30 TABLET | Refills: 0 | OUTPATIENT
Start: 2018-08-20

## 2018-08-29 ENCOUNTER — OFFICE VISIT (OUTPATIENT)
Dept: FAMILY MEDICINE CLINIC | Facility: CLINIC | Age: 48
End: 2018-08-29

## 2018-08-29 VITALS
HEIGHT: 69 IN | DIASTOLIC BLOOD PRESSURE: 80 MMHG | WEIGHT: 213 LBS | RESPIRATION RATE: 16 BRPM | SYSTOLIC BLOOD PRESSURE: 128 MMHG | BODY MASS INDEX: 31.55 KG/M2 | HEART RATE: 69 BPM

## 2018-08-29 DIAGNOSIS — E78.5 HYPERLIPIDEMIA, UNSPECIFIED HYPERLIPIDEMIA TYPE: Primary | ICD-10-CM

## 2018-08-29 DIAGNOSIS — R03.0 ELEVATED BLOOD PRESSURE READING WITHOUT DIAGNOSIS OF HYPERTENSION: ICD-10-CM

## 2018-08-29 DIAGNOSIS — Z23 NEED FOR VACCINATION: ICD-10-CM

## 2018-08-29 PROCEDURE — 90471 IMMUNIZATION ADMIN: CPT | Performed by: INTERNAL MEDICINE

## 2018-08-29 PROCEDURE — 90715 TDAP VACCINE 7 YRS/> IM: CPT | Performed by: INTERNAL MEDICINE

## 2018-08-29 PROCEDURE — 99214 OFFICE O/P EST MOD 30 MIN: CPT | Performed by: INTERNAL MEDICINE

## 2018-08-29 RX ORDER — ATORVASTATIN CALCIUM 40 MG/1
40 TABLET, FILM COATED ORAL DAILY
Qty: 90 TABLET | Refills: 1 | Status: SHIPPED | OUTPATIENT
Start: 2018-08-29 | End: 2019-03-02 | Stop reason: SDUPTHER

## 2018-08-29 NOTE — PROGRESS NOTES
Subjective   Eugene Shabazz is a 48 y.o. male. Patient is here today for   Chief Complaint   Patient presents with   • Hyperlipidemia          Vitals:    08/29/18 0946   BP: 128/80   Pulse: 69   Resp: 16       The following portions of the patient's history were reviewed and updated as appropriate: allergies, current medications, past family history, past medical history, past social history, past surgical history and problem list.    Past Medical History:   Diagnosis Date   • Hyperlipidemia    • Injury of back     2 lumbar surgeries, lamiectomies, most recent 5 years ago - good resolution    • Narcolepsy    • Neck pain    • Peripheral neuropathy       No Known Allergies   Social History     Social History   • Marital status: Unknown     Spouse name: N/A   • Number of children: N/A   • Years of education: N/A     Occupational History   • Not on file.     Social History Main Topics   • Smoking status: Never Smoker   • Smokeless tobacco: Never Used   • Alcohol use Yes   • Drug use: No   • Sexual activity: Defer     Other Topics Concern   • Not on file     Social History Narrative   • No narrative on file        Current Outpatient Prescriptions:   •  atorvastatin (LIPITOR) 40 MG tablet, Take 1 tablet by mouth Daily., Disp: 90 tablet, Rfl: 1  •  ibuprofen (ADVIL,MOTRIN) 800 MG tablet, Take 800 mg by mouth Every 6 (Six) Hours As Needed for Mild Pain ., Disp: , Rfl:      Objective   History of Present Illness Eugene has hyperlipidemia and had lab work in May but missed his appointment.  He needs a refill on atorvastatin 40 mg daily.  He states he has not been eating healthy and is not been exercising.  He also has been noted to have elevated blood pressure readings recently he had some steroid epidural injections.    Review of Systems   Constitutional: Negative for activity change and unexpected weight change.   Respiratory: Negative.    Cardiovascular: Negative.    Neurological: Negative.    Psychiatric/Behavioral:  Negative.        Physical Exam   Constitutional: He appears well-developed and well-nourished.   Cardiovascular: Normal rate, regular rhythm and normal heart sounds.    138/96, 142/98   Pulmonary/Chest: Effort normal and breath sounds normal.   Musculoskeletal: He exhibits no edema.   Psychiatric: He has a normal mood and affect. His behavior is normal. Judgment and thought content normal.   Vitals reviewed.      ASSESSMENT     Problem List Items Addressed This Visit        Cardiovascular and Mediastinum    Hyperlipidemia - Primary    Relevant Medications    atorvastatin (LIPITOR) 40 MG tablet       Other    Elevated blood pressure reading without diagnosis of hypertension      Other Visit Diagnoses     Need for vaccination        Relevant Orders    Tdap Vaccine Greater Than or Equal To 8yo IM (Completed)          PLAN  Patient Instructions   The pressure is high today.  You need to check your blood pressure correctly at home and keep a record over the next month.  Total cholesterol in May was 128.  Triglycerides are mildly elevated and HDL is low.  Discussed diet, exercise, and weight loss per AHA guidelines.       Return in about 1 month (around 9/29/2018) for Recheck.

## 2018-08-29 NOTE — PATIENT INSTRUCTIONS
The pressure is high today.  You need to check your blood pressure correctly at home and keep a record over the next month.  Total cholesterol in May was 128.  Triglycerides are mildly elevated and HDL is low.  Discussed diet, exercise, and weight loss per AHA guidelines.

## 2018-09-28 ENCOUNTER — OFFICE VISIT (OUTPATIENT)
Dept: FAMILY MEDICINE CLINIC | Facility: CLINIC | Age: 48
End: 2018-09-28

## 2018-09-28 VITALS
BODY MASS INDEX: 31.1 KG/M2 | HEIGHT: 69 IN | HEART RATE: 63 BPM | WEIGHT: 210 LBS | SYSTOLIC BLOOD PRESSURE: 138 MMHG | DIASTOLIC BLOOD PRESSURE: 88 MMHG | RESPIRATION RATE: 16 BRPM

## 2018-09-28 DIAGNOSIS — G47.33 OSA (OBSTRUCTIVE SLEEP APNEA): ICD-10-CM

## 2018-09-28 DIAGNOSIS — R03.0 ELEVATED BLOOD PRESSURE READING WITHOUT DIAGNOSIS OF HYPERTENSION: ICD-10-CM

## 2018-09-28 DIAGNOSIS — E78.5 HYPERLIPIDEMIA, UNSPECIFIED HYPERLIPIDEMIA TYPE: Primary | ICD-10-CM

## 2018-09-28 PROCEDURE — 99213 OFFICE O/P EST LOW 20 MIN: CPT | Performed by: INTERNAL MEDICINE

## 2018-09-28 NOTE — PROGRESS NOTES
Subjective   Eugene Shabazz is a 48 y.o. male. Patient is here today for   Chief Complaint   Patient presents with   • Hypertension          Vitals:    09/28/18 0847   BP: 138/88   Pulse: 63   Resp: 16     The following portions of the patient's history were reviewed and updated as appropriate: allergies, current medications, past family history, past medical history, past social history, past surgical history and problem list.    Past Medical History:   Diagnosis Date   • Hyperlipidemia    • Injury of back     2 lumbar surgeries, lamiectomies, most recent 5 years ago - good resolution    • Narcolepsy    • Neck pain    • Peripheral neuropathy       No Known Allergies   Social History     Social History   • Marital status: Unknown     Spouse name: N/A   • Number of children: N/A   • Years of education: N/A     Occupational History   • Not on file.     Social History Main Topics   • Smoking status: Never Smoker   • Smokeless tobacco: Never Used   • Alcohol use Yes   • Drug use: No   • Sexual activity: Defer     Other Topics Concern   • Not on file     Social History Narrative   • No narrative on file        Current Outpatient Prescriptions:   •  atorvastatin (LIPITOR) 40 MG tablet, Take 1 tablet by mouth Daily., Disp: 90 tablet, Rfl: 1  •  ibuprofen (ADVIL,MOTRIN) 800 MG tablet, Take 800 mg by mouth Every 6 (Six) Hours As Needed for Mild Pain ., Disp: , Rfl:      Objective     History of Present Illness Eugene is here for a blood pressure follow-up.  He was seen one month ago and was noted to have elevated blood pressure.  He has a history of hyperlipidemia and is on atorvastatin 40 mg daily.  He started exercising 30 minutes 3 days a week and changed his diet.  He has lost a few pounds in the last month.  He has been monitoring his blood pressure and reports high readings.  He has obstructive sleep apnea and is compliant with CPAP.    Review of Systems   Constitutional: Positive for activity change.        3 lb  weight loss   Cardiovascular:        -138/89       Physical Exam   Constitutional: He appears well-developed and well-nourished.   Cardiovascular: Normal rate, regular rhythm and normal heart sounds.    136/85,133/85   Psychiatric: He has a normal mood and affect. His behavior is normal. Judgment and thought content normal.   Vitals reviewed.      ASSESSMENT     Problem List Items Addressed This Visit        Cardiovascular and Mediastinum    Hyperlipidemia - Primary       Respiratory    GERMAN (obstructive sleep apnea)       Other    Elevated blood pressure reading without diagnosis of hypertension          PLAN  Patient Instructions   Blood pressure is high.  Discussed increasing exercise per AHA guidelines.  Continue diet and weight loss.    Return in about 3 months (around 12/28/2018) for labsBMP,LIPID,UA.

## 2018-09-28 NOTE — PATIENT INSTRUCTIONS
Blood pressure is high.  Discussed increasing exercise per AHA guidelines.  Continue diet and weight loss.

## 2018-12-27 DIAGNOSIS — E78.5 HYPERLIPIDEMIA, UNSPECIFIED HYPERLIPIDEMIA TYPE: Primary | ICD-10-CM

## 2019-01-03 LAB
APPEARANCE UR: CLEAR
BACTERIA #/AREA URNS HPF: NORMAL /HPF
BILIRUB UR QL STRIP: NEGATIVE
BUN SERPL-MCNC: 16 MG/DL (ref 6–20)
BUN/CREAT SERPL: 17.2 (ref 7–25)
CALCIUM SERPL-MCNC: 9.5 MG/DL (ref 8.6–10.5)
CASTS URNS MICRO: NORMAL
CHLORIDE SERPL-SCNC: 101 MMOL/L (ref 98–107)
CHOLEST SERPL-MCNC: 140 MG/DL (ref 0–200)
CO2 SERPL-SCNC: 29.9 MMOL/L (ref 22–29)
COLOR UR: YELLOW
CREAT SERPL-MCNC: 0.93 MG/DL (ref 0.76–1.27)
EPI CELLS #/AREA URNS HPF: NORMAL /HPF
GLUCOSE SERPL-MCNC: 102 MG/DL (ref 65–99)
GLUCOSE UR QL: NEGATIVE
HDLC SERPL-MCNC: 30 MG/DL (ref 40–60)
HGB UR QL STRIP: NEGATIVE
KETONES UR QL STRIP: NEGATIVE
LDLC SERPL CALC-MCNC: 73 MG/DL (ref 0–100)
LDLC/HDLC SERPL: 2.43 {RATIO}
LEUKOCYTE ESTERASE UR QL STRIP: NEGATIVE
NITRITE UR QL STRIP: NEGATIVE
PH UR STRIP: 6 [PH] (ref 5–8)
POTASSIUM SERPL-SCNC: 4.3 MMOL/L (ref 3.5–5.2)
PROT UR QL STRIP: NEGATIVE
RBC #/AREA URNS HPF: NORMAL /HPF
SODIUM SERPL-SCNC: 144 MMOL/L (ref 136–145)
SP GR UR: 1.02 (ref 1–1.03)
TRIGL SERPL-MCNC: 185 MG/DL (ref 0–150)
UROBILINOGEN UR STRIP-MCNC: (no result) MG/DL
VLDLC SERPL CALC-MCNC: 37 MG/DL (ref 5–40)
WBC #/AREA URNS HPF: NORMAL /HPF

## 2019-01-11 ENCOUNTER — OFFICE VISIT (OUTPATIENT)
Dept: FAMILY MEDICINE CLINIC | Facility: CLINIC | Age: 49
End: 2019-01-11

## 2019-01-11 VITALS
DIASTOLIC BLOOD PRESSURE: 88 MMHG | SYSTOLIC BLOOD PRESSURE: 130 MMHG | OXYGEN SATURATION: 98 % | HEIGHT: 69 IN | HEART RATE: 75 BPM | BODY MASS INDEX: 30.96 KG/M2 | TEMPERATURE: 98.8 F | WEIGHT: 209 LBS

## 2019-01-11 DIAGNOSIS — E78.6 LOW HDL (UNDER 40): ICD-10-CM

## 2019-01-11 DIAGNOSIS — E78.5 HYPERLIPIDEMIA, UNSPECIFIED HYPERLIPIDEMIA TYPE: ICD-10-CM

## 2019-01-11 DIAGNOSIS — G47.33 OSA (OBSTRUCTIVE SLEEP APNEA): Primary | ICD-10-CM

## 2019-01-11 DIAGNOSIS — R03.0 ELEVATED BLOOD PRESSURE READING WITHOUT DIAGNOSIS OF HYPERTENSION: ICD-10-CM

## 2019-01-11 PROCEDURE — 99214 OFFICE O/P EST MOD 30 MIN: CPT | Performed by: INTERNAL MEDICINE

## 2019-01-11 NOTE — PATIENT INSTRUCTIONS
Blood pressure is high today.  Discussed monitoring it correctly at home.  Normal is less than 120/80.  Total cholesterol is normal but triglycerides are mildly elevated.  HDL remains low.  Fasting blood sugar is slightly increased.  Discussed diet and exercise per AHA guidelines.

## 2019-01-11 NOTE — PROGRESS NOTES
Subjective   Eugene Shabazz is a 49 y.o. male. Patient is here today for   Chief Complaint   Patient presents with   • Hyperlipidemia     3 mon f/u    • Labs Only          Vitals:    01/11/19 0823   BP: 130/88   Pulse: 75   Temp: 98.8 °F (37.1 °C)   SpO2: 98%       The following portions of the patient's history were reviewed and updated as appropriate: allergies, current medications, past family history, past medical history, past social history, past surgical history and problem list.    Past Medical History:   Diagnosis Date   • Hyperlipidemia    • Injury of back     2 lumbar surgeries, lamiectomies, most recent 5 years ago - good resolution    • Narcolepsy    • Neck pain    • Peripheral neuropathy       No Known Allergies   Social History     Socioeconomic History   • Marital status:      Spouse name: Not on file   • Number of children: Not on file   • Years of education: Not on file   • Highest education level: Not on file   Social Needs   • Financial resource strain: Not on file   • Food insecurity - worry: Not on file   • Food insecurity - inability: Not on file   • Transportation needs - medical: Not on file   • Transportation needs - non-medical: Not on file   Occupational History   • Not on file   Tobacco Use   • Smoking status: Never Smoker   • Smokeless tobacco: Never Used   Substance and Sexual Activity   • Alcohol use: Yes   • Drug use: No   • Sexual activity: Defer   Other Topics Concern   • Not on file   Social History Narrative   • Not on file        Current Outpatient Medications:   •  atorvastatin (LIPITOR) 40 MG tablet, Take 1 tablet by mouth Daily., Disp: 90 tablet, Rfl: 1  •  ibuprofen (ADVIL,MOTRIN) 800 MG tablet, Take 800 mg by mouth Every 6 (Six) Hours As Needed for Mild Pain ., Disp: , Rfl:      Objective   History of Present Illness Eugene is here for blood pressure check and lab follow-up.  He has hyperlipidemia and low HDL, and obstructive sleep apnea.  He is also been noted to  have elevated blood pressure in our office.  He has not been monitoring his blood pressure outside the office as suggested.  He feels well.  He eats healthy and walks the steps at work for exercise.  His weight is unchanged in last 6 months.  He is compliant with CPAP.  He also has osteoarthritis of the spine and had cervical epidural injections last year.  He takes ibuprofen as needed.  Immunizations are up-to-date    Review of Systems   Constitutional: Negative for activity change and unexpected weight change.   Respiratory: Negative.    Cardiovascular: Negative.    Neurological: Negative.    Psychiatric/Behavioral: Negative.        Physical Exam   Constitutional: He appears well-developed and well-nourished.   Neck: Carotid bruit is not present.   Cardiovascular: Normal rate, regular rhythm and normal heart sounds.   130/90   Pulmonary/Chest: Effort normal and breath sounds normal.   Neurological: He is alert.   Psychiatric: He has a normal mood and affect. His behavior is normal. Judgment and thought content normal.   Vitals reviewed.      ASSESSMENT     Problem List Items Addressed This Visit        Cardiovascular and Mediastinum    Hyperlipidemia       Respiratory    GERMAN (obstructive sleep apnea) - Primary       Other    Elevated blood pressure reading without diagnosis of hypertension    Low HDL (under 40)          PLAN  Patient Instructions   Blood pressure is high today.  Discussed monitoring it correctly at home.  Normal is less than 120/80.  Total cholesterol is normal but triglycerides are mildly elevated.  HDL remains low.  Fasting blood sugar is slightly increased.  Discussed diet and exercise per AHA guidelines.      Return in about 6 months (around 7/11/2019) for labs BMP, lipid.

## 2019-02-14 ENCOUNTER — TELEPHONE (OUTPATIENT)
Dept: ORTHOPEDIC SURGERY | Facility: CLINIC | Age: 49
End: 2019-02-14

## 2019-02-14 DIAGNOSIS — M47.22 CERVICAL SPONDYLOSIS WITH RADICULOPATHY: Primary | ICD-10-CM

## 2019-03-01 ENCOUNTER — ANESTHESIA EVENT (OUTPATIENT)
Dept: PAIN MEDICINE | Facility: HOSPITAL | Age: 49
End: 2019-03-01

## 2019-03-01 ENCOUNTER — HOSPITAL ENCOUNTER (OUTPATIENT)
Dept: PAIN MEDICINE | Facility: HOSPITAL | Age: 49
Discharge: HOME | End: 2019-03-01
Admitting: ANESTHESIOLOGY

## 2019-03-01 ENCOUNTER — HOSPITAL ENCOUNTER (OUTPATIENT)
Dept: GENERAL RADIOLOGY | Facility: HOSPITAL | Age: 49
Discharge: HOME | End: 2019-03-01

## 2019-03-01 ENCOUNTER — ANESTHESIA (OUTPATIENT)
Dept: PAIN MEDICINE | Facility: HOSPITAL | Age: 49
End: 2019-03-01

## 2019-03-01 VITALS
SYSTOLIC BLOOD PRESSURE: 133 MMHG | WEIGHT: 209 LBS | BODY MASS INDEX: 30.96 KG/M2 | OXYGEN SATURATION: 99 % | DIASTOLIC BLOOD PRESSURE: 102 MMHG | HEIGHT: 69 IN | RESPIRATION RATE: 16 BRPM | HEART RATE: 91 BPM | TEMPERATURE: 99 F

## 2019-03-01 DIAGNOSIS — M54.2 NECK PAIN: ICD-10-CM

## 2019-03-01 DIAGNOSIS — R52 PAIN: ICD-10-CM

## 2019-03-01 DIAGNOSIS — M47.22 OSTEOARTHRITIS OF SPINE WITH RADICULOPATHY, CERVICAL REGION: Primary | ICD-10-CM

## 2019-03-01 PROCEDURE — 77003 FLUOROGUIDE FOR SPINE INJECT: CPT

## 2019-03-01 PROCEDURE — 25010000002 METHYLPREDNISOLONE PER 80 MG: Performed by: ANESTHESIOLOGY

## 2019-03-01 PROCEDURE — C1755 CATHETER, INTRASPINAL: HCPCS

## 2019-03-01 RX ORDER — FENTANYL CITRATE 50 UG/ML
50 INJECTION, SOLUTION INTRAMUSCULAR; INTRAVENOUS AS NEEDED
Status: DISCONTINUED | OUTPATIENT
Start: 2019-03-01 | End: 2019-03-02 | Stop reason: HOSPADM

## 2019-03-01 RX ORDER — LIDOCAINE HYDROCHLORIDE 10 MG/ML
1 INJECTION, SOLUTION INFILTRATION; PERINEURAL ONCE AS NEEDED
Status: DISCONTINUED | OUTPATIENT
Start: 2019-03-01 | End: 2019-03-02 | Stop reason: HOSPADM

## 2019-03-01 RX ORDER — SODIUM CHLORIDE 0.9 % (FLUSH) 0.9 %
1-10 SYRINGE (ML) INJECTION AS NEEDED
Status: DISCONTINUED | OUTPATIENT
Start: 2019-03-01 | End: 2019-03-02 | Stop reason: HOSPADM

## 2019-03-01 RX ORDER — METHYLPREDNISOLONE ACETATE 80 MG/ML
80 INJECTION, SUSPENSION INTRA-ARTICULAR; INTRALESIONAL; INTRAMUSCULAR; SOFT TISSUE ONCE
Status: COMPLETED | OUTPATIENT
Start: 2019-03-01 | End: 2019-03-01

## 2019-03-01 RX ORDER — MIDAZOLAM HYDROCHLORIDE 1 MG/ML
1 INJECTION INTRAMUSCULAR; INTRAVENOUS AS NEEDED
Status: DISCONTINUED | OUTPATIENT
Start: 2019-03-01 | End: 2019-03-02 | Stop reason: HOSPADM

## 2019-03-01 RX ADMIN — METHYLPREDNISOLONE ACETATE 80 MG: 80 INJECTION, SUSPENSION INTRA-ARTICULAR; INTRALESIONAL; INTRAMUSCULAR; SOFT TISSUE at 08:55

## 2019-03-01 NOTE — H&P
[facility]    History and Physical    Patient Name: Eugene Shabazz  :  1970  MRN:  [id number]  Date of Admission: 3/1/2019    Subjective     Patient is a 49 y.o. male presents with chief complaint of chronic neck and shoulder: left pain.  Onset of symptoms was gradual starting several months ago.  Symptoms are associated/aggravated by nothing in particular. Symptoms improve with injection     He was seen in 2018 for similar symptoms.  He has neck pain that radiates into the left arm.  He has numbness and tingling sensations that radiate into the fourth and fifth fingers on the left side intermittently.  He subjectively feels like there is occasional weakness.  I did a cervical epidural on  which he said was very helpful.  This was done at C4-5, I noted this was the most caudal level I was able to adequately visualize.    He has an MRI from 2017 which shows bulging disks at C5-C6 and C7 as well as degeneration at C6-7.    The following portions of the patients history were reviewed and updated as appropriate: current medications, allergies, past medical history, past surgical history, past family history, past social history and problem list                Objective     Past Medical History:   Past Medical History:   Diagnosis Date   • Hyperlipidemia    • Injury of back     2 lumbar surgeries, lamiectomies, most recent 5 years ago - good resolution    • Narcolepsy    • Neck pain    • Peripheral neuropathy      Past Surgical History:   Past Surgical History:   Procedure Laterality Date   • BACK SURGERY      lumbar x2   • EPIDURAL BLOCK     • HERNIA REPAIR       Family History:   Family History   Problem Relation Age of Onset   • No Known Problems Mother    • Heart disease Father      Social History:   Social History     Tobacco Use   • Smoking status: Never Smoker   • Smokeless tobacco: Never Used   Substance Use Topics   • Alcohol use: Yes   • Drug use: No       Vital Signs Range  "for the last 24 hours  Temperature: Temp:  [37.2 °C (99 °F)] 37.2 °C (99 °F)   Temp Source: Temp src: Oral   BP: BP: (133)/(98) 133/98   Pulse: Heart Rate:  [70] 70   Respirations: Resp:  [16] 16   SPO2: SpO2:  [96 %] 96 %   O2 Amount (l/min):     O2 Devices     Weight: Weight:  [94.8 kg (209 lb)] 94.8 kg (209 lb)     Flowsheet Rows      First Filed Value   Admission Height  175.3 cm (69\") Documented at 03/01/2019 0803   Admission Weight  94.8 kg (209 lb) Documented at 03/01/2019 0803          --------------------------------------------------------------------------------    Current Outpatient Medications   Medication Sig Dispense Refill   • atorvastatin (LIPITOR) 40 MG tablet Take 1 tablet by mouth Daily. 90 tablet 1   • ibuprofen (ADVIL,MOTRIN) 800 MG tablet Take 800 mg by mouth Every 6 (Six) Hours As Needed for Mild Pain .       No current facility-administered medications for this encounter.        --------------------------------------------------------------------------------  Assessment/Plan      Anesthesia Evaluation     no history of anesthetic complications:  NPO Solid Status: N/A      Pain impairs ability to perform ADLs: Exercise/Activity       Airway   Mallampati: III  TM distance: >3 FB  Neck ROM: limited  Possible difficult intubation  Comment: He has pain with extension of his neck.  Dental - normal exam     Pulmonary - normal exam   (+) sleep apnea,   Cardiovascular   Exercise tolerance: good (4-7 METS)    (+) hyperlipidemia,   (-) murmur, carotid bruits      Neuro/Psych  (+) numbness,  Sensory Deficit,    GI/Hepatic/Renal/Endo    (+) obesity,       Musculoskeletal     (+) neck pain,       PE comment: I was unable to appreciate any weakness in  strength bilaterally.  I was unable to appreciate any weakness in the interosseous muscles of his left hand.  He did complain of some tingling and numbness into his left fourth and fifth fingers.  Abdominal    Substance History      OB/GYN          Other "                   Diagnosis and Plan    Treatment Plan  ASA 2   Patient has had previous injection/procedure with 25-50% improvement.   Procedures: Cervical Epidural Steroid Injection(KAY), With fluoroscopy,               Diagnosis     * Cervical neuritis [M54.12]     * Cervical spine degeneration [M47.812]

## 2019-03-01 NOTE — ANESTHESIA PROCEDURE NOTES
PAIN Epidural block    Pre-sedation assessment completed: 3/1/2019 8:47 AM    Patient reassessed immediately prior to procedure    Patient location during procedure: pain clinic  Start Time: 3/1/2019 8:48 AM  Stop Time: 3/1/2019 8:57 AM  Indication:at surgeon's request and procedure for pain  Performed By  Anesthesiologist: Josue Sears MD  Preanesthetic Checklist  Completed: patient identified, surgical consent, pre-op evaluation, timeout performed, risks and benefits discussed and monitors and equipment checked  Additional Notes  Post-Op Diagnosis Codes:     * Cervical neuritis (M54.12)     * Cervical spine degeneration (M47.812)    Prep:  Pt Position:prone  Sterile Tech:sterile barrier, mask and gloves  Prep:chlorhexidine gluconate and isopropyl alcohol  Monitoring:blood pressure monitoring, continuous pulse oximetry and EKG  Procedure:  Sedation: no   Approach:midline  Guidance: fluoroscopy  Location:cervical  Level:5-6  Needle Type:Tuohy  Needle Gauge:20 G  Aspiration:negative  Test Dose:negative  Medications:  Depomedrol:80 mg  Comments:Goal epidural steroid injection was performed at C5-6 level.  I was able adequately to visualize down as far caudally C5-6.  Made the target skin made anesthetic.  I slowly advanced the needle to I was in the interspinous ligament bilateral fluoroscopic guidance.  I then switched to AP fluoroscopy and noted to the needle was off midline.  The needle was redirected towards midline.  I then shot x-rays in the lateral plane again.  With the needle in the midline at the C5-6 level I slowly advanced the needle into the interspinous ligament and continue to advance slowly under intermittent fluoroscopic guidance until there was a loss resistance to injection.  There is no return of red blood cells or cerebral spinal fluid.  There is no pain with injection.  80 mg of Depo-Medrol were slowly injected and the needle was withdrawn.  Post Assessment:  Pt Tolerance:patient tolerated  the procedure well with no apparent complications  Complications:no

## 2019-03-04 RX ORDER — ATORVASTATIN CALCIUM 40 MG/1
TABLET, FILM COATED ORAL
Qty: 30 TABLET | Refills: 5 | Status: SHIPPED | OUTPATIENT
Start: 2019-03-04 | End: 2019-09-09 | Stop reason: SDUPTHER

## 2019-05-22 ENCOUNTER — ANESTHESIA (OUTPATIENT)
Dept: PAIN MEDICINE | Facility: HOSPITAL | Age: 49
End: 2019-05-22

## 2019-05-22 ENCOUNTER — HOSPITAL ENCOUNTER (OUTPATIENT)
Dept: GENERAL RADIOLOGY | Facility: HOSPITAL | Age: 49
Discharge: HOME | End: 2019-05-22

## 2019-05-22 ENCOUNTER — HOSPITAL ENCOUNTER (OUTPATIENT)
Dept: PAIN MEDICINE | Facility: HOSPITAL | Age: 49
Discharge: HOME | End: 2019-05-22
Admitting: ANESTHESIOLOGY

## 2019-05-22 ENCOUNTER — ANESTHESIA EVENT (OUTPATIENT)
Dept: PAIN MEDICINE | Facility: HOSPITAL | Age: 49
End: 2019-05-22

## 2019-05-22 VITALS
OXYGEN SATURATION: 98 % | HEART RATE: 60 BPM | WEIGHT: 209 LBS | RESPIRATION RATE: 16 BRPM | BODY MASS INDEX: 30.96 KG/M2 | SYSTOLIC BLOOD PRESSURE: 139 MMHG | HEIGHT: 69 IN | DIASTOLIC BLOOD PRESSURE: 93 MMHG | TEMPERATURE: 97.8 F

## 2019-05-22 DIAGNOSIS — R52 PAIN: ICD-10-CM

## 2019-05-22 DIAGNOSIS — M54.12 CERVICAL RADICULOPATHY: Primary | ICD-10-CM

## 2019-05-22 PROCEDURE — 77003 FLUOROGUIDE FOR SPINE INJECT: CPT

## 2019-05-22 PROCEDURE — 25010000002 METHYLPREDNISOLONE PER 80 MG: Performed by: ANESTHESIOLOGY

## 2019-05-22 PROCEDURE — C1755 CATHETER, INTRASPINAL: HCPCS

## 2019-05-22 PROCEDURE — 0 IOPAMIDOL 41 % SOLUTION: Performed by: ANESTHESIOLOGY

## 2019-05-22 RX ORDER — LIDOCAINE HYDROCHLORIDE 10 MG/ML
1 INJECTION, SOLUTION INFILTRATION; PERINEURAL ONCE AS NEEDED
Status: DISCONTINUED | OUTPATIENT
Start: 2019-05-22 | End: 2019-05-23 | Stop reason: HOSPADM

## 2019-05-22 RX ORDER — METHYLPREDNISOLONE ACETATE 80 MG/ML
80 INJECTION, SUSPENSION INTRA-ARTICULAR; INTRALESIONAL; INTRAMUSCULAR; SOFT TISSUE ONCE
Status: COMPLETED | OUTPATIENT
Start: 2019-05-22 | End: 2019-05-22

## 2019-05-22 RX ADMIN — METHYLPREDNISOLONE ACETATE 80 MG: 80 INJECTION, SUSPENSION INTRA-ARTICULAR; INTRALESIONAL; INTRAMUSCULAR; SOFT TISSUE at 13:11

## 2019-05-22 RX ADMIN — IOPAMIDOL 1 ML: 408 INJECTION, SOLUTION INTRATHECAL at 13:11

## 2019-05-22 NOTE — ANESTHESIA PROCEDURE NOTES
PAIN Epidural block    Pre-sedation assessment completed: 5/22/2019 1:03 PM    Patient reassessed immediately prior to procedure    Patient location during procedure: pain clinic  Start Time: 5/22/2019 1:04 PM  Stop Time: 5/22/2019 1:12 PM  Indication:at surgeon's request and procedure for pain  Performed By  Anesthesiologist: Josue Sears MD  Preanesthetic Checklist  Completed: patient identified, surgical consent, pre-op evaluation, timeout performed, risks and benefits discussed and monitors and equipment checked  Additional Notes  Post-Op Diagnosis Codes:     * Cervical stenosis of spine (M48.02)     * Cervical radiculopathy (M54.12)    Prep:  Pt Position:prone  Sterile Tech:sterile barrier, mask, gloves and cap  Prep:chlorhexidine gluconate and isopropyl alcohol  Monitoring:blood pressure monitoring, continuous pulse oximetry and EKG  Procedure:Sedation: no     Approach:midline  Guidance: fluoroscopy  Location:cervical  Level:5-6  Needle Type:Tuohy  Needle Gauge:20 G  Aspiration:negative  Test Dose:negative  Medications:  Depomedrol:80 mg  Preservative Free Saline:2mL  Isovue:1mL  Comments:Cervical epidural steroid injection was performed under fluoroscopic guidance.  Lateral fluoroscopy was used to visualize the cervical spine.  The needle was then advanced into the C5-6 interspace.  It was slowly advanced under fluoroscopic guidance until I reached a point which I felt that should be into the epidural space.  I then rotated the C-arm to an AP plane and was able to demonstrate I was slightly to the right of midline.  The needle was removed approximately 1 cm and then readvanced in a more medial fashion.  There was a loss of resistance to injection with the needle tip nearly in the midline.  There is no return of red cells or CSF.  1 mL of Isovue slowly injected demonstrating a faint blush in the epidural space.  80 mg of Depo-Medrol were injected and the needle was withdrawn.  He tolerated procedure  well.  Post Assessment:  Pt Tolerance:patient tolerated the procedure well with no apparent complications  Complications:no

## 2019-05-22 NOTE — H&P
[facility]    History and Physical    Patient Name: Eugene Shabazz  :  1970  MRN:  [id number]  Date of Admission: 2019    Subjective     Patient is a 49 y.o. male presents with chief complaint of chronic neck, shoulder: bilateral and arm: bilateral pain.  Onset of symptoms was gradual starting several years ago.  Symptoms are associated/aggravated by nothing in particular. Symptoms improve with injection    The following portions of the patients history were reviewed and updated as appropriate: current medications, allergies, past medical history, past surgical history, past family history, past social history and problem list   He had several epidural steroid injections in the past.  They generally give him good relief of his pain.  The most recent one did give him at least 50% relief of his pain which at least lasted several months.  He says the pain is beginning to come back but it is still improved over baseline.  He has an MRI of the cervical spine which shows essentially some spondylitic changes and some stenosis at C5-6 and C6-7.                Objective     Past Medical History:   Past Medical History:   Diagnosis Date   • Hyperlipidemia    • Injury of back     2 lumbar surgeries, lamiectomies, most recent 5 years ago - good resolution    • Narcolepsy    • Neck pain    • Peripheral neuropathy      Past Surgical History:   Past Surgical History:   Procedure Laterality Date   • BACK SURGERY      lumbar x2   • EPIDURAL BLOCK     • HERNIA REPAIR       Family History:   Family History   Problem Relation Age of Onset   • No Known Problems Mother    • Heart disease Father      Social History:   Social History     Tobacco Use   • Smoking status: Never Smoker   • Smokeless tobacco: Never Used   Substance Use Topics   • Alcohol use: Yes   • Drug use: No       Vital Signs Range for the last 24 hours  Temperature: Temp:  [36.6 °C (97.8 °F)] 36.6 °C (97.8 °F)   Temp Source: Temp src: Oral   BP: BP:  "(137)/(90) 137/90   Pulse: Heart Rate:  [67] 67   Respirations: Resp:  [16] 16   SPO2: SpO2:  [99 %] 99 %   O2 Amount (l/min):     O2 Devices Device (Oxygen Therapy): room air   Weight: Weight:  [94.8 kg (209 lb)] 94.8 kg (209 lb)     Flowsheet Rows      First Filed Value   Admission Height  175.3 cm (69\") Documented at 05/22/2019 1236   Admission Weight  94.8 kg (209 lb) Documented at 05/22/2019 1236          --------------------------------------------------------------------------------    Current Outpatient Medications   Medication Sig Dispense Refill   • atorvastatin (LIPITOR) 40 MG tablet TAKE ONE TABLET BY MOUTH DAILY 30 tablet 5   • ibuprofen (ADVIL,MOTRIN) 800 MG tablet Take 800 mg by mouth Every 6 (Six) Hours As Needed for Mild Pain .       Current Facility-Administered Medications   Medication Dose Route Frequency Provider Last Rate Last Dose   • iopamidol (ISOVUE-M 200) injection 41%  12 mL Epidural Once in imaging Josue Sears MD       • lidocaine (XYLOCAINE) 1 % injection 1 mL  1 mL Intradermal Once PRN Josue Sears MD       • methylPREDNISolone acetate (DEPO-medrol) injection 80 mg  80 mg Intra-articular Once Josue Sears MD           --------------------------------------------------------------------------------  Assessment/Plan      Anesthesia Evaluation     NPO Solid Status: N/A      Pain impairs ability to perform ADLs: Sleeping and Exercise/Activity  Modalities previously tried to control pain with limited effectiveness within the last 4-6 weeks: Rest and OTC medications     Airway   Mallampati: II  TM distance: >3 FB  Dental - normal exam     Pulmonary - normal exam   (+) sleep apnea,   Cardiovascular     Rhythm: regular    (+) hyperlipidemia,   (-) murmur, peripheral edema      Neuro/Psych  (+) numbness,     (-) normal sensory deficit  GI/Hepatic/Renal/Endo - negative ROS     Musculoskeletal     (+) neck pain,       PE comment: He has resting neck pain and shoulder pain.  It " really does not radiate much into his arms currently.  There is minimal pain with extension of his neck.  There is no obvious decrease in  strength.  There is no obvious decrease in biceps or triceps.  Abdominal    Substance History      OB/GYN          Other                   Diagnosis and Plan    Treatment Plan  ASA 2   Patient has had previous injection/procedure   Procedures: Cervical Epidural Steroid Injection(KAY), With fluoroscopy,           We will plan on attempting cervical epidural steroid injection with the hopes of going either at C5-6 or C6-7.    Diagnosis     * Cervical stenosis of spine [M48.02]     * Cervical radiculopathy [M54.12]

## 2019-07-10 DIAGNOSIS — E78.5 HYPERLIPIDEMIA, UNSPECIFIED HYPERLIPIDEMIA TYPE: Primary | ICD-10-CM

## 2019-07-12 LAB
BUN SERPL-MCNC: 18 MG/DL (ref 6–20)
BUN/CREAT SERPL: 16.8 (ref 7–25)
CALCIUM SERPL-MCNC: 9.8 MG/DL (ref 8.6–10.5)
CHLORIDE SERPL-SCNC: 100 MMOL/L (ref 98–107)
CHOLEST SERPL-MCNC: 142 MG/DL (ref 0–200)
CO2 SERPL-SCNC: 28.4 MMOL/L (ref 22–29)
CREAT SERPL-MCNC: 1.07 MG/DL (ref 0.76–1.27)
GLUCOSE SERPL-MCNC: 94 MG/DL (ref 65–99)
HDLC SERPL-MCNC: 31 MG/DL (ref 40–60)
LDLC SERPL CALC-MCNC: 62 MG/DL (ref 0–100)
LDLC/HDLC SERPL: 1.99 {RATIO}
POTASSIUM SERPL-SCNC: 4.4 MMOL/L (ref 3.5–5.2)
SODIUM SERPL-SCNC: 142 MMOL/L (ref 136–145)
TRIGL SERPL-MCNC: 247 MG/DL (ref 0–150)
VLDLC SERPL CALC-MCNC: 49.4 MG/DL

## 2019-07-24 ENCOUNTER — OFFICE VISIT (OUTPATIENT)
Dept: FAMILY MEDICINE CLINIC | Facility: CLINIC | Age: 49
End: 2019-07-24

## 2019-07-24 VITALS
HEART RATE: 72 BPM | WEIGHT: 212 LBS | SYSTOLIC BLOOD PRESSURE: 140 MMHG | BODY MASS INDEX: 31.4 KG/M2 | RESPIRATION RATE: 16 BRPM | OXYGEN SATURATION: 99 % | TEMPERATURE: 97 F | DIASTOLIC BLOOD PRESSURE: 80 MMHG | HEIGHT: 69 IN

## 2019-07-24 DIAGNOSIS — G47.33 OSA (OBSTRUCTIVE SLEEP APNEA): ICD-10-CM

## 2019-07-24 DIAGNOSIS — E78.5 HYPERLIPIDEMIA, UNSPECIFIED HYPERLIPIDEMIA TYPE: Primary | ICD-10-CM

## 2019-07-24 DIAGNOSIS — E78.6 LOW HDL (UNDER 40): ICD-10-CM

## 2019-07-24 DIAGNOSIS — R03.0 ELEVATED BLOOD PRESSURE READING WITHOUT DIAGNOSIS OF HYPERTENSION: ICD-10-CM

## 2019-07-24 DIAGNOSIS — I10 ESSENTIAL HYPERTENSION: ICD-10-CM

## 2019-07-24 PROCEDURE — 99214 OFFICE O/P EST MOD 30 MIN: CPT | Performed by: INTERNAL MEDICINE

## 2019-07-24 NOTE — PROGRESS NOTES
Subjective   Eugene Shabazz is a 49 y.o. male. Patient is here today for   Chief Complaint   Patient presents with   • Hyperlipidemia          Vitals:    07/24/19 0820   BP: 140/80   Pulse: 72   Resp: 16   Temp: 97 °F (36.1 °C)   SpO2: 99%       The following portions of the patient's history were reviewed and updated as appropriate: allergies, current medications, past family history, past medical history, past social history, past surgical history and problem list.    Past Medical History:   Diagnosis Date   • Hyperlipidemia    • Injury of back     2 lumbar surgeries, lamiectomies, most recent 5 years ago - good resolution    • Narcolepsy    • Neck pain    • Peripheral neuropathy       No Known Allergies   Social History     Socioeconomic History   • Marital status:      Spouse name: Not on file   • Number of children: Not on file   • Years of education: Not on file   • Highest education level: Not on file   Tobacco Use   • Smoking status: Never Smoker   • Smokeless tobacco: Never Used   Substance and Sexual Activity   • Alcohol use: Yes   • Drug use: No   • Sexual activity: Defer        Current Outpatient Medications:   •  atorvastatin (LIPITOR) 40 MG tablet, TAKE ONE TABLET BY MOUTH DAILY, Disp: 30 tablet, Rfl: 5  •  ibuprofen (ADVIL,MOTRIN) 800 MG tablet, Take 800 mg by mouth Every 6 (Six) Hours As Needed for Mild Pain ., Disp: , Rfl:      Objective   History of Present Illness Eugene is here for blood pressure check and lab follow-up.  He has hyperlipidemia.  His blood pressure has been elevated the past few visits and he has been instructed to monitor it correctly at home.  He has not.  He has not been eating as healthy as he should and does not exercise.  He has a sedentary job.  His weight is been stable.    Review of Systems   Constitutional: Negative for activity change and unexpected weight change.   Respiratory: Negative.    Cardiovascular: Negative.    Neurological: Negative.     Psychiatric/Behavioral: Negative.        Physical Exam   Constitutional: He appears well-developed and well-nourished.   Neck: Carotid bruit is not present.   Cardiovascular: Normal rate, regular rhythm and normal heart sounds.   140/95   Pulmonary/Chest: Effort normal and breath sounds normal.   Musculoskeletal: He exhibits no edema.   Neurological: He is alert.   Psychiatric: He has a normal mood and affect. His behavior is normal. Judgment and thought content normal.   Vitals reviewed.      ASSESSMENT     Problem List Items Addressed This Visit        Cardiovascular and Mediastinum    Hyperlipidemia - Primary    Essential hypertension       Respiratory    GERMAN (obstructive sleep apnea)       Other    Elevated blood pressure reading without diagnosis of hypertension    Low HDL (under 40)          PLAN  Patient Instructions   You have hypertension.  First step would be lifestyle modification which means low-sodium diet and a cardiovascular exercise per AHA guidelines.  Will initiate medicine if blood pressure remains elevated over the next few weeks.  Total cholesterol is normal but triglycerides are high.  Discussed diet.      Return in about 6 months (around 1/24/2020) for labs CBC, CMP, lipid, urinalysis, TSH, EKG.

## 2019-07-24 NOTE — PATIENT INSTRUCTIONS
You have hypertension.  First step would be lifestyle modification which means low-sodium diet and a cardiovascular exercise per AHA guidelines.  Will initiate medicine if blood pressure remains elevated over the next few weeks.  Total cholesterol is normal but triglycerides are high.  Discussed diet.

## 2019-08-12 RX ORDER — LOSARTAN POTASSIUM 25 MG/1
25 TABLET ORAL DAILY
Qty: 30 TABLET | Refills: 5 | Status: SHIPPED | OUTPATIENT
Start: 2019-08-12 | End: 2020-02-11

## 2019-08-15 ENCOUNTER — TRANSCRIBE ORDERS (OUTPATIENT)
Dept: ADMINISTRATIVE | Facility: HOSPITAL | Age: 49
End: 2019-08-15

## 2019-08-15 DIAGNOSIS — Z13.6 ENCOUNTER FOR SCREENING FOR VASCULAR DISEASE: Primary | ICD-10-CM

## 2019-08-20 ENCOUNTER — HOSPITAL ENCOUNTER (OUTPATIENT)
Dept: GENERAL RADIOLOGY | Facility: HOSPITAL | Age: 49
Discharge: HOME | End: 2019-08-20

## 2019-08-20 ENCOUNTER — ANESTHESIA EVENT (OUTPATIENT)
Dept: PAIN MEDICINE | Facility: HOSPITAL | Age: 49
End: 2019-08-20

## 2019-08-20 ENCOUNTER — ANESTHESIA (OUTPATIENT)
Dept: PAIN MEDICINE | Facility: HOSPITAL | Age: 49
End: 2019-08-20

## 2019-08-20 ENCOUNTER — HOSPITAL ENCOUNTER (OUTPATIENT)
Dept: PAIN MEDICINE | Facility: HOSPITAL | Age: 49
Discharge: HOME | End: 2019-08-20
Admitting: ANESTHESIOLOGY

## 2019-08-20 VITALS
HEART RATE: 72 BPM | DIASTOLIC BLOOD PRESSURE: 87 MMHG | TEMPERATURE: 98.2 F | RESPIRATION RATE: 16 BRPM | OXYGEN SATURATION: 97 % | SYSTOLIC BLOOD PRESSURE: 125 MMHG

## 2019-08-20 DIAGNOSIS — M54.2 NECK PAIN: Primary | ICD-10-CM

## 2019-08-20 DIAGNOSIS — R52 PAIN: ICD-10-CM

## 2019-08-20 DIAGNOSIS — R20.2 PARESTHESIA OF BOTH HANDS: ICD-10-CM

## 2019-08-20 PROCEDURE — C1755 CATHETER, INTRASPINAL: HCPCS

## 2019-08-20 PROCEDURE — 77003 FLUOROGUIDE FOR SPINE INJECT: CPT

## 2019-08-20 PROCEDURE — 25010000002 DEXAMETHASONE SODIUM PHOSPHATE 10 MG/ML SOLUTION: Performed by: ANESTHESIOLOGY

## 2019-08-20 PROCEDURE — 0 IOPAMIDOL 41 % SOLUTION: Performed by: ANESTHESIOLOGY

## 2019-08-20 RX ORDER — DEXAMETHASONE SODIUM PHOSPHATE 10 MG/ML
10 INJECTION, SOLUTION INTRAMUSCULAR; INTRAVENOUS ONCE
Status: COMPLETED | OUTPATIENT
Start: 2019-08-20 | End: 2019-08-20

## 2019-08-20 RX ORDER — LIDOCAINE HYDROCHLORIDE 10 MG/ML
1 INJECTION, SOLUTION INFILTRATION; PERINEURAL ONCE AS NEEDED
Status: DISCONTINUED | OUTPATIENT
Start: 2019-08-20 | End: 2019-08-21 | Stop reason: HOSPADM

## 2019-08-20 RX ORDER — MIDAZOLAM HYDROCHLORIDE 1 MG/ML
1 INJECTION INTRAMUSCULAR; INTRAVENOUS AS NEEDED
Status: DISCONTINUED | OUTPATIENT
Start: 2019-08-20 | End: 2019-08-21 | Stop reason: HOSPADM

## 2019-08-20 RX ORDER — SODIUM CHLORIDE 0.9 % (FLUSH) 0.9 %
1-10 SYRINGE (ML) INJECTION AS NEEDED
Status: DISCONTINUED | OUTPATIENT
Start: 2019-08-20 | End: 2019-08-21 | Stop reason: HOSPADM

## 2019-08-20 RX ORDER — FENTANYL CITRATE 50 UG/ML
50 INJECTION, SOLUTION INTRAMUSCULAR; INTRAVENOUS AS NEEDED
Status: DISCONTINUED | OUTPATIENT
Start: 2019-08-20 | End: 2019-08-21 | Stop reason: HOSPADM

## 2019-08-20 RX ADMIN — DEXAMETHASONE SODIUM PHOSPHATE 10 MG: 10 INJECTION, SOLUTION INTRAMUSCULAR; INTRAVENOUS at 08:58

## 2019-08-20 RX ADMIN — IOPAMIDOL 2 ML: 408 INJECTION, SOLUTION INTRATHECAL at 08:58

## 2019-08-20 NOTE — ANESTHESIA PROCEDURE NOTES
PAIN Epidural block    Pre-sedation assessment completed: 8/20/2019 8:51 AM    Patient reassessed immediately prior to procedure    Start Time: 8/20/2019 8:52 AM  Stop Time: 8/20/2019 9:00 AM  Indication:at surgeon's request and procedure for pain  Performed By  Anesthesiologist: Jsoue Sears MD  Preanesthetic Checklist  Completed: patient identified, surgical consent, pre-op evaluation, timeout performed, risks and benefits discussed and monitors and equipment checked  Additional Notes  Post-Op Diagnosis Codes:     * Cervical neuritis (M54.12)     * Cervical spine degeneration (M47.812)    Prep:  Pt Position:prone  Sterile Tech:sterile barrier, mask and gloves  Prep:chlorhexidine gluconate and isopropyl alcohol  Monitoring:blood pressure monitoring, continuous pulse oximetry and EKG  Procedure:Sedation: no     Approach:midline  Guidance: fluoroscopy  Location:cervical  Level:5-6  Needle Type:Tuohy  Needle Gauge:20 G  Aspiration:negative  Test Dose:negative  Medications:  Analgesia: 10 mg dexamethasone.  Preservative Free Saline:2mL  Isovue:2mL  Comments:Cervical epidural steroid injection was performed.  Initially I utilized the lateral plane to identify at the C5-6 level.  The epidural needle was passed into the interspinous ligament at that level and slowly advanced.  I then utilized AP to reposition the needle such that it was in the midline.  Subsequently I repositioned to the lateral plane and slowly advanced until there was a loss of resistance to injection.  There was no return of red blood cells or cerebral spinal fluid.  2 mL of Isovue were injected demonstrating cranial caudal spread of contrast media.  There is no pain with injection.  10 mg of dexamethasone were injected without pain and the needle was withdrawn.  Post Assessment:  Pt Tolerance:patient tolerated the procedure well with no apparent complications  Complications:no

## 2019-08-20 NOTE — H&P
[facility]    History and Physical    Patient Name: Eugene Shabazz  :  1970  MRN:  [id number]  Date of Admission: 2019    Subjective     Patient is a 49 y.o. male presents with chief complaint of chronic neck and shoulder: bilateral pain.  Onset of symptoms was gradual starting several months ago.  Symptoms are associated/aggravated by nothing in particular or activity. Symptoms improve with injection    The following portions of the patients history were reviewed and updated as appropriate: current medications, allergies, past medical history, past surgical history, past family history, past social history and problem list     He has pain in his neck that radiates into his left shoulder more so than his right.  He states that the pain seems to be somewhat better in both of his upper extremities however he feels like the numbness is getting somewhat worse.  Pain into his shoulder is worse than the left and it radiates down below the elbow and into his hands at times.    He has an MRI of his neck which shows multiple levels of degeneration and disc bulges.  He denies any pain with Valsalva or weakness at this time.                Objective     Past Medical History:   Past Medical History:   Diagnosis Date   • Hyperlipidemia    • Hypertension    • Injury of back     2 lumbar surgeries, lamiectomies, most recent 5 years ago - good resolution    • Narcolepsy    • Neck pain    • Peripheral neuropathy      Past Surgical History:   Past Surgical History:   Procedure Laterality Date   • BACK SURGERY      lumbar x2   • EPIDURAL BLOCK     • HERNIA REPAIR       Family History:   Family History   Problem Relation Age of Onset   • No Known Problems Mother    • Heart disease Father      Social History:   Social History     Tobacco Use   • Smoking status: Never Smoker   • Smokeless tobacco: Never Used   Substance Use Topics   • Alcohol use: Yes   • Drug use: No       Vital Signs Range for the last 24  hours  Temperature: Temp:  [36.8 °C (98.2 °F)] 36.8 °C (98.2 °F)   Temp Source: Temp src: Oral   BP: BP: (136)/(90) 136/90   Pulse: Heart Rate:  [71] 71   Respirations: Resp:  [16] 16   SPO2: SpO2:  [96 %] 96 %   O2 Amount (l/min):     O2 Devices Device (Oxygen Therapy): room air   Weight:           --------------------------------------------------------------------------------    Current Outpatient Medications   Medication Sig Dispense Refill   • atorvastatin (LIPITOR) 40 MG tablet TAKE ONE TABLET BY MOUTH DAILY 30 tablet 5   • ibuprofen (ADVIL,MOTRIN) 800 MG tablet Take 800 mg by mouth Every 6 (Six) Hours As Needed for Mild Pain .     • losartan (COZAAR) 25 MG tablet Take 1 tablet by mouth Daily. 30 tablet 5     Current Facility-Administered Medications   Medication Dose Route Frequency Provider Last Rate Last Dose   • dexamethasone sodium phosphate injection 10 mg  10 mg Intra-articular Once Josue Sears MD       • fentaNYL citrate (PF) (SUBLIMAZE) injection 50 mcg  50 mcg Intravenous PRN Josue Sears MD       • iopamidol (ISOVUE-M 200) injection 41%  12 mL Epidural Once in imaging Josue Sears MD       • lidocaine (XYLOCAINE) 1 % injection 1 mL  1 mL Intradermal Once PRN Josue Sears MD       • midazolam (VERSED) injection 1 mg  1 mg Intravenous PRN Josue Sears MD       • sodium chloride 0.9 % flush 1-10 mL  1-10 mL Intravenous PRN Josue Sears MD           --------------------------------------------------------------------------------  Assessment/Plan      Anesthesia Evaluation           Pain impairs ability to perform ADLs: Exercise/Activity  Modalities previously tried to control pain with limited effectiveness within the last 4-6 weeks: OTC medications     Airway   Mallampati: III  TM distance: >3 FB  Neck ROM: limited  Possible difficult intubation  Dental - normal exam     Pulmonary - normal exam   Cardiovascular     Rhythm: regular    (-) murmur      Neuro/Psych  (+)  abnormal reflex,       PE Comment: I was unable to elicit either biceps or brachioradialis on the left.  Biceps and brachioradialis on the right were both intact.  At this time he did not demonstrate any focal or dermatomal deficits.  GI/Hepatic/Renal/Endo      Musculoskeletal (-) normal exam        PE comment:  strength was strong and equal bilaterally.  Biceps appeared to be intact and equal bilaterally  Triceps appear to be intact and equal bilaterally  Abdominal    Substance History      OB/GYN          Other                   Diagnosis and Plan    Treatment Plan  ASA 2   Patient has had previous injection/procedure with 50-75% improvement.   Procedures: Cervical Epidural Steroid Injection(KAY), With fluoroscopy,           We will plan on cervical epidural steroid injection under fluoroscopic guidance.  The most recent one was done at C5-6.    Diagnosis     * Cervical neuritis [M54.12]     * Cervical spine degeneration [M47.812]

## 2019-09-09 RX ORDER — ATORVASTATIN CALCIUM 40 MG/1
TABLET, FILM COATED ORAL
Qty: 30 TABLET | Refills: 4 | Status: SHIPPED | OUTPATIENT
Start: 2019-09-09 | End: 2020-02-11

## 2019-11-04 ENCOUNTER — HOSPITAL ENCOUNTER (OUTPATIENT)
Dept: CARDIOLOGY | Facility: HOSPITAL | Age: 49
Discharge: HOME | End: 2019-11-04
Admitting: INTERNAL MEDICINE

## 2019-11-04 ENCOUNTER — HOSPITAL ENCOUNTER (OUTPATIENT)
Dept: CT IMAGING | Facility: HOSPITAL | Age: 49
Discharge: HOME | End: 2019-11-04

## 2019-11-04 DIAGNOSIS — Z13.6 ENCOUNTER FOR SCREENING FOR VASCULAR DISEASE: ICD-10-CM

## 2019-11-04 PROCEDURE — 75571 CT HRT W/O DYE W/CA TEST: CPT

## 2019-11-04 PROCEDURE — 93799 UNLISTED CV SVC/PROCEDURE: CPT

## 2019-11-05 LAB
BH CV XLRA MEAS - PAD LEFT ABI PT: 1.24
BH CV XLRA MEAS - PAD LEFT ARM: 124 MMHG
BH CV XLRA MEAS - PAD LEFT LEG PT: 161 MMHG
BH CV XLRA MEAS - PAD RIGHT ABI PT: 1.18
BH CV XLRA MEAS - PAD RIGHT ARM: 130 MMHG
BH CV XLRA MEAS - PAD RIGHT LEG PT: 154 MMHG
BH CV XLRA MEAS - PROX AO DIAM: 1.5 CM
BH CV XLRA MEAS LEFT DIST CCA EDV: -29.8 CM/SEC
BH CV XLRA MEAS LEFT DIST CCA PSV: -96.3 CM/SEC
BH CV XLRA MEAS LEFT ICA/CCA RATIO: 1.1
BH CV XLRA MEAS LEFT PROX ICA EDV: -43.4 CM/SEC
BH CV XLRA MEAS LEFT PROX ICA PSV: -104 CM/SEC
BH CV XLRA MEAS RIGHT DIST CCA EDV: -29.2 CM/SEC
BH CV XLRA MEAS RIGHT DIST CCA PSV: -95.1 CM/SEC
BH CV XLRA MEAS RIGHT ICA/CCA RATIO: 1.2
BH CV XLRA MEAS RIGHT PROX ICA EDV: -34.2 CM/SEC
BH CV XLRA MEAS RIGHT PROX ICA PSV: -118 CM/SEC

## 2019-11-08 ENCOUNTER — TELEPHONE (OUTPATIENT)
Dept: ORTHOPEDIC SURGERY | Facility: CLINIC | Age: 49
End: 2019-11-08

## 2019-11-08 DIAGNOSIS — M54.2 CERVICAL SPINE PAIN: ICD-10-CM

## 2019-11-08 DIAGNOSIS — M47.22 CERVICAL SPONDYLOSIS WITH RADICULOPATHY: Primary | ICD-10-CM

## 2019-11-25 DIAGNOSIS — Z00.00 ROUTINE HEALTH MAINTENANCE: Primary | ICD-10-CM

## 2019-11-25 DIAGNOSIS — I10 ESSENTIAL HYPERTENSION: ICD-10-CM

## 2019-11-27 ENCOUNTER — ANESTHESIA (OUTPATIENT)
Dept: PAIN MEDICINE | Facility: HOSPITAL | Age: 49
End: 2019-11-27

## 2019-11-27 ENCOUNTER — HOSPITAL ENCOUNTER (OUTPATIENT)
Dept: GENERAL RADIOLOGY | Facility: HOSPITAL | Age: 49
Discharge: HOME | End: 2019-11-27

## 2019-11-27 ENCOUNTER — ANESTHESIA EVENT (OUTPATIENT)
Dept: PAIN MEDICINE | Facility: HOSPITAL | Age: 49
End: 2019-11-27

## 2019-11-27 ENCOUNTER — HOSPITAL ENCOUNTER (OUTPATIENT)
Dept: PAIN MEDICINE | Facility: HOSPITAL | Age: 49
Discharge: HOME | End: 2019-11-27
Admitting: ANESTHESIOLOGY

## 2019-11-27 VITALS
TEMPERATURE: 97.8 F | WEIGHT: 209 LBS | BODY MASS INDEX: 30.96 KG/M2 | HEART RATE: 67 BPM | DIASTOLIC BLOOD PRESSURE: 84 MMHG | SYSTOLIC BLOOD PRESSURE: 125 MMHG | OXYGEN SATURATION: 97 % | RESPIRATION RATE: 16 BRPM | HEIGHT: 69 IN

## 2019-11-27 DIAGNOSIS — M54.2 NECK PAIN: Primary | ICD-10-CM

## 2019-11-27 DIAGNOSIS — R52 PAIN: ICD-10-CM

## 2019-11-27 PROCEDURE — 25010000002 DEXAMETHASONE SODIUM PHOSPHATE 10 MG/ML SOLUTION: Performed by: ANESTHESIOLOGY

## 2019-11-27 PROCEDURE — 0 IOPAMIDOL 41 % SOLUTION: Performed by: ANESTHESIOLOGY

## 2019-11-27 PROCEDURE — C1755 CATHETER, INTRASPINAL: HCPCS

## 2019-11-27 PROCEDURE — 77003 FLUOROGUIDE FOR SPINE INJECT: CPT

## 2019-11-27 RX ORDER — LIDOCAINE HYDROCHLORIDE 10 MG/ML
1 INJECTION, SOLUTION INFILTRATION; PERINEURAL ONCE AS NEEDED
Status: DISCONTINUED | OUTPATIENT
Start: 2019-11-27 | End: 2019-11-28 | Stop reason: HOSPADM

## 2019-11-27 RX ORDER — SODIUM CHLORIDE 0.9 % (FLUSH) 0.9 %
1-10 SYRINGE (ML) INJECTION AS NEEDED
Status: DISCONTINUED | OUTPATIENT
Start: 2019-11-27 | End: 2019-11-28 | Stop reason: HOSPADM

## 2019-11-27 RX ORDER — FENTANYL CITRATE 50 UG/ML
50 INJECTION, SOLUTION INTRAMUSCULAR; INTRAVENOUS AS NEEDED
Status: DISCONTINUED | OUTPATIENT
Start: 2019-11-27 | End: 2019-11-28 | Stop reason: HOSPADM

## 2019-11-27 RX ORDER — DEXAMETHASONE SODIUM PHOSPHATE 10 MG/ML
10 INJECTION, SOLUTION INTRAMUSCULAR; INTRAVENOUS ONCE
Status: COMPLETED | OUTPATIENT
Start: 2019-11-27 | End: 2019-11-27

## 2019-11-27 RX ORDER — MIDAZOLAM HYDROCHLORIDE 1 MG/ML
1 INJECTION INTRAMUSCULAR; INTRAVENOUS AS NEEDED
Status: DISCONTINUED | OUTPATIENT
Start: 2019-11-27 | End: 2019-11-28 | Stop reason: HOSPADM

## 2019-11-27 RX ADMIN — IOPAMIDOL 1 ML: 408 INJECTION, SOLUTION INTRATHECAL at 08:31

## 2019-11-27 RX ADMIN — DEXAMETHASONE SODIUM PHOSPHATE 10 MG: 10 INJECTION, SOLUTION INTRAMUSCULAR; INTRAVENOUS at 08:32

## 2019-11-27 NOTE — ANESTHESIA PROCEDURE NOTES
PAIN Epidural block    Pre-sedation assessment completed: 11/27/2019 8:25 AM    Patient reassessed immediately prior to procedure    Patient location during procedure: pain clinic  Start Time: 11/27/2019 8:26 AM  Stop Time: 11/27/2019 8:34 AM  Indication:at surgeon's request and procedure for pain  Performed By  Anesthesiologist: Josue Sears MD  Preanesthetic Checklist  Completed: patient identified, surgical consent, pre-op evaluation, timeout performed, risks and benefits discussed and monitors and equipment checked  Additional Notes  Post-Op Diagnosis Codes:     * Cervical neuritis (M54.12)     * Cervical spinal stenosis (M48.02)    Prep:  Pt Position:prone  Sterile Tech:sterile barrier, mask and gloves  Prep:chlorhexidine gluconate and isopropyl alcohol  Monitoring:blood pressure monitoring, continuous pulse oximetry and EKG  Procedure:Sedation: no     Approach:midline  Guidance: fluoroscopy  Location:cervical  Level:5-6  Needle Gauge:20 G  Aspiration:negative  Test Dose:negative  Medications:  Analgesia: dexamethasone 10.  Preservative Free Saline:2mL  Isovue:2mL    Post Assessment:  Pt Tolerance:patient tolerated the procedure well with no apparent complications  Complications:no

## 2019-11-27 NOTE — H&P
[facility]    History and Physical    Patient Name: Eugene Shabazz  :  1970  MRN:  [id number]  Date of Admission: 2019    Subjective     Patient is a 49 y.o. male presents with chief complaint of chronic neck and shoulder: bilateral pain.  Onset of symptoms was gradual starting several years ago.  Symptoms are associated/aggravated by nothing in particular. Symptoms improve with injection    The following portions of the patients history were reviewed and updated as appropriate: current medications, allergies, past medical history, past surgical history, past family history, past social history and problem list   He complains of neck pain that radiates into both of his upper extremities.  Initially just went into his shoulders and then down into his hands.  It initially followed about a C8 distribution.  Now his entire hand feels numb and tingly when he extends his neck.    He had cervical epidural steroid injection in the past which he says was fairly helpful but the pain is starting to come back.  He said it did help some with the numbness and tingling as well but that that is returned as well.    He has an MRI which shows spondylitic protrusions and foraminal stenosis at C5-6 and C6-7.                Objective     Past Medical History:   Past Medical History:   Diagnosis Date   • Hyperlipidemia    • Hypertension    • Injury of back     2 lumbar surgeries, lamiectomies, most recent 5 years ago - good resolution    • Narcolepsy    • Neck pain    • Peripheral neuropathy    • Sleep apnea      Past Surgical History:   Past Surgical History:   Procedure Laterality Date   • BACK SURGERY      lumbar x2   • EPIDURAL BLOCK     • HERNIA REPAIR       Family History:   Family History   Problem Relation Age of Onset   • No Known Problems Mother    • Heart disease Father      Social History:   Social History     Tobacco Use   • Smoking status: Never Smoker   • Smokeless tobacco: Never Used   Substance Use  "Topics   • Alcohol use: Yes   • Drug use: No       Vital Signs Range for the last 24 hours  Temperature: Temp:  [36.6 °C (97.8 °F)] 36.6 °C (97.8 °F)   Temp Source: Temp src: Oral   BP: BP: (136)/(91) 136/91   Pulse: Heart Rate:  [66] 66   Respirations: Resp:  [16] 16   SPO2: SpO2:  [96 %] 96 %   O2 Amount (l/min):     O2 Devices Device (Oxygen Therapy): room air   Weight: Weight:  [94.8 kg (209 lb)] 94.8 kg (209 lb)     Flowsheet Rows      First Filed Value   Admission Height  175.3 cm (69\") Documented at 11/27/2019 0808   Admission Weight  94.8 kg (209 lb) Documented at 11/27/2019 0808          --------------------------------------------------------------------------------    Current Outpatient Medications   Medication Sig Dispense Refill   • atorvastatin (LIPITOR) 40 MG tablet TAKE ONE TABLET BY MOUTH DAILY 30 tablet 4   • ibuprofen (ADVIL,MOTRIN) 800 MG tablet Take 800 mg by mouth Every 6 (Six) Hours As Needed for Mild Pain .     • losartan (COZAAR) 25 MG tablet Take 1 tablet by mouth Daily. 30 tablet 5     Current Facility-Administered Medications   Medication Dose Route Frequency Provider Last Rate Last Dose   • dexamethasone sodium phosphate injection 10 mg  10 mg Intravenous Once RenderJosue MD       • fentaNYL citrate (PF) (SUBLIMAZE) injection 50 mcg  50 mcg Intravenous PRN Josue Sears MD       • iopamidol (ISOVUE-M 200) injection 41%  12 mL Epidural Once in imaging Josue Sears MD       • lidocaine (XYLOCAINE) 1 % injection 1 mL  1 mL Intradermal Once PRN Josue Sears MD       • midazolam (VERSED) injection 1 mg  1 mg Intravenous PRN Josue Sears MD       • sodium chloride 0.9 % flush 1-10 mL  1-10 mL Intravenous PRN Render, Josue Ray, MD           --------------------------------------------------------------------------------  Assessment/Plan      Anesthesia Evaluation           Pain impairs ability to perform ADLs: Exercise/Activity       Airway   Mallampati: II  TM " distance: >3 FB  Neck ROM: full  No difficulty expected  Dental - normal exam     Pulmonary - normal exam   (+) sleep apnea,   Cardiovascular     Rhythm: regular    (+) hypertension, hyperlipidemia,   (-) murmur      Neuro/Psych  (+) numbness,       PE Comment: Biceps, triceps and brachial radialis deep tendon reflexes are intact bilaterally      He complains of intermittent numbness and tingling in his entire hands.    GI/Hepatic/Renal/Endo      Musculoskeletal     (+) neck pain,       PE comment: He has decreased range of motion with respect to extension of his neck.    Hip strength is intact bilaterally biceps and triceps appear intact without weakness bilaterally.  Abdominal   (+) obese,    Substance History      OB/GYN          Other                   Diagnosis and Plan    Treatment Plan  ASA 2      Procedures: Cervical Epidural Steroid Injection(KAY), With fluoroscopy,           Plan cervical epidural steroid injection.  He is been appraised the risks and benefits including but not limited to bleeding, infection, dural puncture headache, allergic adverse reaction to local anesthetic or steroid and exacerbation of his pain.  And agrees to proceed.      Diagnosis     * Cervical neuritis [M54.12]     * Cervical spinal stenosis [M48.02]

## 2019-12-06 ENCOUNTER — CLINICAL SUPPORT (OUTPATIENT)
Dept: FAMILY MEDICINE CLINIC | Facility: CLINIC | Age: 49
End: 2019-12-06

## 2019-12-06 DIAGNOSIS — Z00.00 ROUTINE HEALTH MAINTENANCE: Primary | ICD-10-CM

## 2019-12-06 PROCEDURE — 93000 ELECTROCARDIOGRAM COMPLETE: CPT | Performed by: INTERNAL MEDICINE

## 2019-12-06 PROCEDURE — 85025 COMPLETE CBC W/AUTO DIFF WBC: CPT | Performed by: INTERNAL MEDICINE

## 2019-12-07 LAB
ALBUMIN SERPL-MCNC: 4.8 G/DL (ref 3.5–5.2)
ALBUMIN/GLOB SERPL: 3 G/DL
ALP SERPL-CCNC: 61 U/L (ref 39–117)
ALT SERPL-CCNC: 31 U/L (ref 1–41)
APPEARANCE UR: CLEAR
AST SERPL-CCNC: 21 U/L (ref 1–40)
BILIRUB SERPL-MCNC: 0.8 MG/DL (ref 0.2–1.2)
BILIRUB UR QL STRIP: NEGATIVE
BUN SERPL-MCNC: 13 MG/DL (ref 6–20)
BUN/CREAT SERPL: 14.1 (ref 7–25)
CALCIUM SERPL-MCNC: 9.4 MG/DL (ref 8.6–10.5)
CHLORIDE SERPL-SCNC: 104 MMOL/L (ref 98–107)
CHOLEST SERPL-MCNC: 125 MG/DL (ref 0–200)
CO2 SERPL-SCNC: 31.2 MMOL/L (ref 22–29)
COLOR UR: YELLOW
CREAT SERPL-MCNC: 0.92 MG/DL (ref 0.76–1.27)
GLOBULIN SER CALC-MCNC: 1.6 GM/DL
GLUCOSE SERPL-MCNC: 100 MG/DL (ref 65–99)
GLUCOSE UR QL: NEGATIVE
HDLC SERPL-MCNC: 30 MG/DL (ref 40–60)
HGB UR QL STRIP: NEGATIVE
KETONES UR QL STRIP: NEGATIVE
LDLC SERPL CALC-MCNC: 62 MG/DL (ref 0–100)
LDLC/HDLC SERPL: 2.07 {RATIO}
LEUKOCYTE ESTERASE UR QL STRIP: NEGATIVE
NITRITE UR QL STRIP: NEGATIVE
PH UR STRIP: 7 [PH] (ref 5–8)
POTASSIUM SERPL-SCNC: 5 MMOL/L (ref 3.5–5.2)
PROT SERPL-MCNC: 6.4 G/DL (ref 6–8.5)
PROT UR QL STRIP: NEGATIVE
SODIUM SERPL-SCNC: 144 MMOL/L (ref 136–145)
SP GR UR: 1.02 (ref 1–1.03)
TRIGL SERPL-MCNC: 164 MG/DL (ref 0–150)
TSH SERPL DL<=0.005 MIU/L-ACNC: 1.89 UIU/ML (ref 0.27–4.2)
UROBILINOGEN UR STRIP-MCNC: NORMAL MG/DL
VLDLC SERPL CALC-MCNC: 32.8 MG/DL

## 2019-12-13 ENCOUNTER — OFFICE VISIT (OUTPATIENT)
Dept: FAMILY MEDICINE CLINIC | Facility: CLINIC | Age: 49
End: 2019-12-13

## 2019-12-13 VITALS
DIASTOLIC BLOOD PRESSURE: 82 MMHG | RESPIRATION RATE: 16 BRPM | TEMPERATURE: 97 F | HEART RATE: 83 BPM | WEIGHT: 209 LBS | HEIGHT: 70 IN | BODY MASS INDEX: 29.92 KG/M2 | OXYGEN SATURATION: 98 % | SYSTOLIC BLOOD PRESSURE: 128 MMHG

## 2019-12-13 DIAGNOSIS — G47.33 OSA (OBSTRUCTIVE SLEEP APNEA): ICD-10-CM

## 2019-12-13 DIAGNOSIS — I10 ESSENTIAL HYPERTENSION: Primary | ICD-10-CM

## 2019-12-13 DIAGNOSIS — Z00.00 ANNUAL PHYSICAL EXAM: ICD-10-CM

## 2019-12-13 DIAGNOSIS — Z12.11 ENCOUNTER FOR SCREENING FOR MALIGNANT NEOPLASM OF COLON: ICD-10-CM

## 2019-12-13 DIAGNOSIS — E78.6 LOW HDL (UNDER 40): ICD-10-CM

## 2019-12-13 DIAGNOSIS — E78.5 HYPERLIPIDEMIA, UNSPECIFIED HYPERLIPIDEMIA TYPE: ICD-10-CM

## 2019-12-13 LAB — HEMOCCULT STL QL IA: NEGATIVE

## 2019-12-13 PROCEDURE — 82274 ASSAY TEST FOR BLOOD FECAL: CPT | Performed by: INTERNAL MEDICINE

## 2019-12-13 PROCEDURE — 99396 PREV VISIT EST AGE 40-64: CPT | Performed by: INTERNAL MEDICINE

## 2019-12-13 NOTE — PATIENT INSTRUCTIONS
Blood pressure is high by my readings.  You need to monitor your blood pressure correctly at home while rested and relaxed as instructed.  Goal is less than 120/80.  Will increase losartan to 50 mg daily if indicated.  Fasting blood sugar is minimally elevated.  Total and LDL cholesterol are at goal.  HDL cholesterol remains low.  A complete blood count, kidney functions, liver functions, thyroid studies, and urinalysis are normal.  Discussed diet and exercise per AHA guidelines.  Discussed results of vascular screening test and coronary artery calcium score.

## 2019-12-13 NOTE — PROGRESS NOTES
Subjective   Eugene Shabazz is a 49 y.o. male. Patient is here today for   Chief Complaint   Patient presents with   • Annual Exam          Vitals:    12/13/19 1119   BP: 128/82   Pulse: 83   Resp: 16   Temp: 97 °F (36.1 °C)   SpO2: 98%     Body mass index is 29.99 kg/m².    The following portions of the patient's history were reviewed and updated as appropriate: allergies, current medications, past family history, past medical history, past social history, past surgical history and problem list.    Past Medical History:   Diagnosis Date   • Hyperlipidemia    • Hypertension    • Injury of back     2 lumbar surgeries, lamiectomies, most recent 5 years ago - good resolution    • Narcolepsy    • Neck pain    • Peripheral neuropathy    • Sleep apnea       No Known Allergies   Social History     Socioeconomic History   • Marital status:      Spouse name: Not on file   • Number of children: Not on file   • Years of education: Not on file   • Highest education level: Not on file   Tobacco Use   • Smoking status: Never Smoker   • Smokeless tobacco: Never Used   Substance and Sexual Activity   • Alcohol use: Yes   • Drug use: No   • Sexual activity: Defer        Current Outpatient Medications:   •  atorvastatin (LIPITOR) 40 MG tablet, TAKE ONE TABLET BY MOUTH DAILY, Disp: 30 tablet, Rfl: 4  •  ibuprofen (ADVIL,MOTRIN) 800 MG tablet, Take 800 mg by mouth Every 6 (Six) Hours As Needed for Mild Pain ., Disp: , Rfl:   •  losartan (COZAAR) 25 MG tablet, Take 1 tablet by mouth Daily., Disp: 30 tablet, Rfl: 5     EKG normal    Objective   History of Present Illness Eugene is here today with his wife for an annual physical examination.  He has hypertension, struct of sleep apnea, hyperlipidemia, low HDL, and cervical arthritis..  He also has a history of narcolepsy which is not being treated.  He previously was on narcolepsy medicines but insurance would not pay for them anymore.  He feels well.  He was started on losartan  25 mg in July.  He has not been monitoring his blood pressure.  He tries to eat healthy but does not exercise.  He did have some cervical epidural injections this year.  He recently had vascular screening and had some carotid artery plaque without significant stenosis.  He also had a coronary artery calcium score which was elevated.  His father had cardiovascular disease and diabetes.    Review of Systems   Constitutional: Negative for activity change and unexpected weight change.   Respiratory: Negative.    Cardiovascular: Negative.    Neurological: Negative.    Psychiatric/Behavioral: Negative.        Physical Exam   Constitutional: He appears well-developed and well-nourished.   HENT:   Nose: Nose normal.   Mouth/Throat: Oropharynx is clear and moist.   Eyes: Pupils are equal, round, and reactive to light. EOM are normal.   Neck: Carotid bruit is not present. No thyromegaly present.   Cardiovascular: Normal rate, regular rhythm and normal heart sounds.   132/96   Pulmonary/Chest: Effort normal and breath sounds normal.   Abdominal: Soft. Bowel sounds are normal. There is no tenderness.   Genitourinary: Prostate normal. Rectal exam shows guaiac negative stool.   Musculoskeletal: He exhibits no edema.   Lymphadenopathy:     He has no cervical adenopathy.   Neurological: He is alert.   Skin: Skin is warm and dry.   Psychiatric: He has a normal mood and affect. His behavior is normal. Judgment and thought content normal.   Vitals reviewed.      ASSESSMENT     Problem List Items Addressed This Visit        Cardiovascular and Mediastinum    Hyperlipidemia    Essential hypertension - Primary       Respiratory    GERMAN (obstructive sleep apnea)       Other    Low HDL (under 40)      Other Visit Diagnoses     Encounter for screening for malignant neoplasm of colon        Relevant Orders    POCT FECAL OCCULT BLOOD BY IMMUNOASSAY (Completed)          PLAN  Patient Instructions   Blood pressure is high by my readings.  You  need to monitor your blood pressure correctly at home while rested and relaxed as instructed.  Goal is less than 120/80.  Will increase losartan to 50 mg daily if indicated.  Fasting blood sugar is minimally elevated.  Total and LDL cholesterol are at goal.  HDL cholesterol remains low.  A complete blood count, kidney functions, liver functions, thyroid studies, and urinalysis are normal.  Discussed diet and exercise per AHA guidelines.  Discussed results of vascular screening test and coronary artery calcium score.      Return in about 6 months (around 6/13/2020) for labs BMP, A1c, lipid.

## 2020-02-11 RX ORDER — LOSARTAN POTASSIUM 25 MG/1
25 TABLET ORAL DAILY
Qty: 30 TABLET | Refills: 4 | Status: SHIPPED | OUTPATIENT
Start: 2020-02-11 | End: 2020-05-12 | Stop reason: SDUPTHER

## 2020-02-11 RX ORDER — ATORVASTATIN CALCIUM 40 MG/1
TABLET, FILM COATED ORAL
Qty: 30 TABLET | Refills: 3 | Status: SHIPPED | OUTPATIENT
Start: 2020-02-11 | End: 2020-05-14

## 2020-05-11 ENCOUNTER — TELEPHONE (OUTPATIENT)
Dept: FAMILY MEDICINE CLINIC | Facility: CLINIC | Age: 50
End: 2020-05-11

## 2020-05-11 NOTE — TELEPHONE ENCOUNTER
Pt called and said that his pharmacy called and advised that his BP med of losartan (COZAAR) 25 MG tablet will be on back order until July. Pt is out of medication and wanted to know what other medication could be called in that will be equally as effective.      [facility] pharmacy confirmed     Please contact pt and advise @ [phone]

## 2020-05-12 RX ORDER — LOSARTAN POTASSIUM 25 MG/1
25 TABLET ORAL DAILY
Qty: 90 TABLET | Refills: 0 | Status: SHIPPED | OUTPATIENT
Start: 2020-05-12 | End: 2020-08-13 | Stop reason: SDUPTHER

## 2020-05-12 NOTE — TELEPHONE ENCOUNTER
PATIENT CALLED BACK WITH PHARMACY INFORMATION TO SEND HIS LOSARTAN AS REQUESTED YESTERDAY PER THE NURSE     [facility] [address] [place] [id number] [facility] - [phone] [facility] [phone] FX     PATIENT C/B # [phone]

## 2020-05-14 RX ORDER — ATORVASTATIN CALCIUM 40 MG/1
TABLET, FILM COATED ORAL
Qty: 30 TABLET | Refills: 3 | Status: SHIPPED | OUTPATIENT
Start: 2020-05-14 | End: 2020-06-19 | Stop reason: SDUPTHER

## 2020-06-03 DIAGNOSIS — E78.5 HYPERLIPIDEMIA, UNSPECIFIED HYPERLIPIDEMIA TYPE: ICD-10-CM

## 2020-06-03 DIAGNOSIS — I10 ESSENTIAL HYPERTENSION: Primary | ICD-10-CM

## 2020-06-03 DIAGNOSIS — R73.9 BLOOD GLUCOSE ELEVATED: ICD-10-CM

## 2020-06-03 DIAGNOSIS — R03.0 ELEVATED BLOOD PRESSURE READING WITHOUT DIAGNOSIS OF HYPERTENSION: ICD-10-CM

## 2020-06-12 ENCOUNTER — OFFICE VISIT (OUTPATIENT)
Dept: FAMILY MEDICINE CLINIC | Facility: CLINIC | Age: 50
End: 2020-06-12

## 2020-06-12 VITALS
TEMPERATURE: 97.1 F | SYSTOLIC BLOOD PRESSURE: 118 MMHG | RESPIRATION RATE: 16 BRPM | HEART RATE: 84 BPM | OXYGEN SATURATION: 98 % | WEIGHT: 205.2 LBS | BODY MASS INDEX: 30.39 KG/M2 | DIASTOLIC BLOOD PRESSURE: 72 MMHG | HEIGHT: 69 IN

## 2020-06-12 DIAGNOSIS — L73.9 FOLLICULITIS: Primary | ICD-10-CM

## 2020-06-12 PROCEDURE — 99213 OFFICE O/P EST LOW 20 MIN: CPT | Performed by: NURSE PRACTITIONER

## 2020-06-12 RX ORDER — CEPHALEXIN 750 MG/1
750 CAPSULE ORAL 2 TIMES DAILY
Qty: 20 CAPSULE | Refills: 0 | Status: SHIPPED | OUTPATIENT
Start: 2020-06-12 | End: 2020-06-22

## 2020-06-12 NOTE — PATIENT INSTRUCTIONS
Wash area daily with dial soap and water  Keep area clean and dry  Keep covered with bandage  May use warm compress to area  If no improvement or worsening of area call/rto

## 2020-06-12 NOTE — PROGRESS NOTES
"Subjective     Eugene Shabazz is a 50 y.o.. male.     Pt stating he has had a knot to right chest area for about 1 year. Pt stating that for past several days the area has become red, raised and swollen.      The following portions of the patient's history were reviewed and updated as appropriate: allergies, current medications, past family history, past medical history, past social history, past surgical history and problem list.    Past Medical History:   Diagnosis Date   • Hyperlipidemia    • Hypertension    • Injury of back     2 lumbar surgeries, lamiectomies, most recent 5 years ago - good resolution    • Narcolepsy    • Neck pain    • Peripheral neuropathy    • Sleep apnea        Past Surgical History:   Procedure Laterality Date   • BACK SURGERY      lumbar x2   • EPIDURAL BLOCK     • HERNIA REPAIR         Review of Systems   Constitutional: Negative for fever.   Skin:        Right chest knot that is red and swollen       No Known Allergies    Objective     Vitals:    06/12/20 1428   BP: 118/72   BP Location: Left arm   Patient Position: Sitting   Cuff Size: Adult   Pulse: 84   Resp: 16   Temp: 97.1 °F (36.2 °C)   SpO2: 98%   Weight: 93.1 kg (205 lb 3.2 oz)   Height: 175.3 cm (69\")     Body mass index is 30.3 kg/m².    Physical Exam   Constitutional: He is oriented to person, place, and time. He appears well-developed.   HENT:   Head: Normocephalic.   Eyes: Pupils are equal, round, and reactive to light.   Cardiovascular: Normal rate and regular rhythm.   Pulmonary/Chest: Effort normal and breath sounds normal.   Musculoskeletal: Normal range of motion.   Neurological: He is alert and oriented to person, place, and time.   Skin:   Right upper chest: red, hard, warm area with head at base of hair follicle, swelling to surrounding tissue noted; no discharge noted, tender to palpation   Vitals reviewed.        Current Outpatient Medications:   •  atorvastatin (LIPITOR) 40 MG tablet, TAKE ONE TABLET BY MOUTH " DAILY, Disp: 30 tablet, Rfl: 3  •  ibuprofen (ADVIL,MOTRIN) 800 MG tablet, Take 800 mg by mouth Every 6 (Six) Hours As Needed for Mild Pain ., Disp: , Rfl:   •  losartan (COZAAR) 25 MG tablet, Take 1 tablet by mouth Daily., Disp: 90 tablet, Rfl: 0  •  cephalexin (Keflex) 750 MG capsule, Take 1 capsule by mouth 2 (two) times a day for 10 days., Disp: 20 capsule, Rfl: 0  •  mupirocin (Bactroban) 2 % ointment, Apply to affected area two times a day for 10-14 days, Disp: 15 g, Rfl: 0    Assessment/Plan   Eugene was seen today for follow-up.    Diagnoses and all orders for this visit:    Folliculitis  -     mupirocin (Bactroban) 2 % ointment; Apply to affected area two times a day for 10-14 days  -     cephalexin (Keflex) 750 MG capsule; Take 1 capsule by mouth 2 (two) times a day for 10 days.        Patient Instructions   Wash area daily with dial soap and water  Keep area clean and dry  Keep covered with bandage  May use warm compress to area  If no improvement or worsening of area call/rto         Return if symptoms worsen or fail to improve.

## 2020-06-16 LAB
BUN SERPL-MCNC: 15 MG/DL (ref 6–20)
BUN/CREAT SERPL: 15.3 (ref 7–25)
CALCIUM SERPL-MCNC: 9.5 MG/DL (ref 8.6–10.5)
CHLORIDE SERPL-SCNC: 105 MMOL/L (ref 98–107)
CHOLEST SERPL-MCNC: 115 MG/DL (ref 0–200)
CO2 SERPL-SCNC: 29.2 MMOL/L (ref 22–29)
CREAT SERPL-MCNC: 0.98 MG/DL (ref 0.76–1.27)
GLUCOSE SERPL-MCNC: 107 MG/DL (ref 65–99)
HBA1C MFR BLD: 5.4 % (ref 4.8–5.6)
HDLC SERPL-MCNC: 31 MG/DL (ref 40–60)
LDLC SERPL CALC-MCNC: 61 MG/DL (ref 0–100)
LDLC/HDLC SERPL: 1.97 {RATIO}
POTASSIUM SERPL-SCNC: 4.2 MMOL/L (ref 3.5–5.2)
SODIUM SERPL-SCNC: 142 MMOL/L (ref 136–145)
TRIGL SERPL-MCNC: 115 MG/DL (ref 0–150)
VLDLC SERPL CALC-MCNC: 23 MG/DL

## 2020-06-19 ENCOUNTER — RESULTS ENCOUNTER (OUTPATIENT)
Dept: FAMILY MEDICINE CLINIC | Facility: CLINIC | Age: 50
End: 2020-06-19

## 2020-06-19 ENCOUNTER — OFFICE VISIT (OUTPATIENT)
Dept: FAMILY MEDICINE CLINIC | Facility: CLINIC | Age: 50
End: 2020-06-19

## 2020-06-19 VITALS
OXYGEN SATURATION: 98 % | DIASTOLIC BLOOD PRESSURE: 70 MMHG | HEIGHT: 69 IN | RESPIRATION RATE: 18 BRPM | TEMPERATURE: 97.5 F | SYSTOLIC BLOOD PRESSURE: 114 MMHG | HEART RATE: 70 BPM | WEIGHT: 205.6 LBS | BODY MASS INDEX: 30.45 KG/M2

## 2020-06-19 DIAGNOSIS — Z12.11 SCREENING FOR COLON CANCER: ICD-10-CM

## 2020-06-19 DIAGNOSIS — E78.5 HYPERLIPIDEMIA, UNSPECIFIED HYPERLIPIDEMIA TYPE: ICD-10-CM

## 2020-06-19 DIAGNOSIS — I10 ESSENTIAL HYPERTENSION: Primary | ICD-10-CM

## 2020-06-19 PROCEDURE — 99214 OFFICE O/P EST MOD 30 MIN: CPT | Performed by: NURSE PRACTITIONER

## 2020-06-19 RX ORDER — ATORVASTATIN CALCIUM 40 MG/1
40 TABLET, FILM COATED ORAL DAILY
Qty: 90 TABLET | Refills: 1 | Status: SHIPPED | OUTPATIENT
Start: 2020-06-19 | End: 2020-10-26

## 2020-06-19 NOTE — PATIENT INSTRUCTIONS
HTN: continue losartan as prescribed, should check b/p at least once a week; stay on a heart healthy diet, drink 64 oz a water a day and try to get some exercise in through out the week.     HLD: stable, continue atorvastatin as prescribed, try to increase exercise to help improve HDL.     Abscess/folliculitis: continue keflex and bactroban oint. As prescribed; use warm compress to area to help with discomfort and to bring to head; call/rto if no improvement or worsening after completion of keflex.

## 2020-06-19 NOTE — PROGRESS NOTES
"Subjective     Eugene Shabazz is a 50 y.o.. male.     Pt here today for lab follow up.  Pt has a hx of hypertension, sleep apnea, hyperlipidemia, low HDL, and cervical arthritis..  Pt admits to not checking his b/p at home. Pt stating he is doing better with his diet but still not eating a complete heart healthy diet. Pt stating he drinks some water through out day. Pt stating he used to walk for exercise but his father is ill right now and unable to find the time to exercise. Pt stating he uses his CPAP at night.       The following portions of the patient's history were reviewed and updated as appropriate: allergies, current medications, past family history, past medical history, past social history, past surgical history and problem list.    Past Medical History:   Diagnosis Date   • Hyperlipidemia    • Hypertension    • Injury of back     2 lumbar surgeries, lamiectomies, most recent 5 years ago - good resolution    • Narcolepsy    • Neck pain    • Peripheral neuropathy    • Sleep apnea        Past Surgical History:   Procedure Laterality Date   • BACK SURGERY      lumbar x2   • EPIDURAL BLOCK     • HERNIA REPAIR         Review of Systems   Constitutional: Negative.    Respiratory: Negative.    Cardiovascular: Negative.    Musculoskeletal:        Started Keflex this past Monday for his folliculitis area on right side of chest; still red and tender to the touch.       No Known Allergies    Objective     Vitals:    06/19/20 0809   BP: 114/70   Pulse: 70   Resp: 18   Temp: 97.5 °F (36.4 °C)   SpO2: 98%   Weight: 93.3 kg (205 lb 9.6 oz)   Height: 175.3 cm (69.02\")     Body mass index is 30.35 kg/m².    Physical Exam   Constitutional: He is oriented to person, place, and time. He appears well-developed.   HENT:   Head: Normocephalic.   Eyes: Pupils are equal, round, and reactive to light.   Neck: Carotid bruit is not present.   Cardiovascular: Normal rate and regular rhythm.   Radial and DP pulses wnl  No ankle " edema noted   Pulmonary/Chest: Effort normal and breath sounds normal.   Musculoskeletal: Normal range of motion.   Neurological: He is alert and oriented to person, place, and time.   Skin:   Right upper chest: medium sized red, warm, abscess noted, with some mild swelling to surrounding tissue.   Vitals reviewed.        Current Outpatient Medications:   •  atorvastatin (LIPITOR) 40 MG tablet, Take 1 tablet by mouth Daily., Disp: 90 tablet, Rfl: 1  •  cephalexin (Keflex) 750 MG capsule, Take 1 capsule by mouth 2 (two) times a day for 10 days., Disp: 20 capsule, Rfl: 0  •  ibuprofen (ADVIL,MOTRIN) 800 MG tablet, Take 800 mg by mouth Every 6 (Six) Hours As Needed for Mild Pain ., Disp: , Rfl:   •  losartan (COZAAR) 25 MG tablet, Take 1 tablet by mouth Daily., Disp: 90 tablet, Rfl: 0  •  mupirocin (Bactroban) 2 % ointment, Apply to affected area two times a day for 10-14 days, Disp: 15 g, Rfl: 0      Assessment/Plan   Eugene was seen today for follow-up.    Diagnoses and all orders for this visit:    Essential hypertension    Hyperlipidemia, unspecified hyperlipidemia type  -     atorvastatin (LIPITOR) 40 MG tablet; Take 1 tablet by mouth Daily.    Screening for colon cancer  -     Cologuard - Stool, Per Rectum; Future        Patient Instructions   HTN: continue losartan as prescribed, should check b/p at least once a week; stay on a heart healthy diet, drink 64 oz a water a day and try to get some exercise in through out the week.     HLD: stable, continue atorvastatin as prescribed, try to increase exercise to help improve HDL.     Abscess/folliculitis: continue keflex and bactroban oint. As prescribed; use warm compress to area to help with discomfort and to bring to head; call/rto if no improvement or worsening after completion of keflex.        Return for follow up in 6 months with fasting labs and Dr. Corbett follow up.

## 2020-07-06 ENCOUNTER — TELEPHONE (OUTPATIENT)
Dept: FAMILY MEDICINE CLINIC | Facility: CLINIC | Age: 50
End: 2020-07-06

## 2020-07-06 NOTE — TELEPHONE ENCOUNTER
PT CALLED IN WANTING TO KNOW IF [facility] CALL COULD CALL HIM IN A PRESCRIPTION FOR ANXIETY.  PT STATES THAT HIS FATHER JUST PASSED AWAY,  HIS MOTHER HAS FALLEN DOWN SOME STEPS AND HE FEELS LIKE HE'S LOSING IT.       PT CALL BACK  [phone]  PHARMACY:  [facility]   [address] [place] [address]  [phone]

## 2020-07-07 DIAGNOSIS — F41.9 ANXIETY: Primary | ICD-10-CM

## 2020-07-07 RX ORDER — ALPRAZOLAM 0.5 MG/1
0.5 TABLET ORAL 2 TIMES DAILY PRN
Qty: 30 TABLET | Refills: 2 | Status: SHIPPED | OUTPATIENT
Start: 2020-07-07

## 2020-08-13 RX ORDER — LOSARTAN POTASSIUM 25 MG/1
25 TABLET ORAL DAILY
Qty: 90 TABLET | Refills: 0 | Status: SHIPPED | OUTPATIENT
Start: 2020-08-13 | End: 2020-11-13 | Stop reason: SDUPTHER

## 2020-08-25 ENCOUNTER — TELEPHONE (OUTPATIENT)
Dept: FAMILY MEDICINE CLINIC | Facility: CLINIC | Age: 50
End: 2020-08-25

## 2020-08-25 NOTE — TELEPHONE ENCOUNTER
Spoke with patient re: cologuard test. He will call [facility] to see if he can still use test kit that he received. If not he will order a new one.

## 2020-10-24 DIAGNOSIS — E78.5 HYPERLIPIDEMIA, UNSPECIFIED HYPERLIPIDEMIA TYPE: ICD-10-CM

## 2020-10-26 RX ORDER — ATORVASTATIN CALCIUM 40 MG/1
TABLET, FILM COATED ORAL
Qty: 90 TABLET | Refills: 3 | Status: SHIPPED | OUTPATIENT
Start: 2020-10-26 | End: 2021-10-19

## 2020-11-16 RX ORDER — LOSARTAN POTASSIUM 25 MG/1
25 TABLET ORAL DAILY
Qty: 90 TABLET | Refills: 3 | Status: SHIPPED | OUTPATIENT
Start: 2020-11-16 | End: 2021-11-13 | Stop reason: SDUPTHER

## 2020-12-01 DIAGNOSIS — I10 ESSENTIAL HYPERTENSION: Primary | ICD-10-CM

## 2020-12-01 DIAGNOSIS — E78.5 HYPERLIPIDEMIA, UNSPECIFIED HYPERLIPIDEMIA TYPE: ICD-10-CM

## 2020-12-09 LAB
BUN SERPL-MCNC: 16 MG/DL (ref 6–20)
BUN/CREAT SERPL: 15.8 (ref 7–25)
CALCIUM SERPL-MCNC: 9.3 MG/DL (ref 8.6–10.5)
CHLORIDE SERPL-SCNC: 102 MMOL/L (ref 98–107)
CHOLEST SERPL-MCNC: 132 MG/DL (ref 0–200)
CO2 SERPL-SCNC: 30.3 MMOL/L (ref 22–29)
CREAT SERPL-MCNC: 1.01 MG/DL (ref 0.76–1.27)
GLUCOSE SERPL-MCNC: 100 MG/DL (ref 65–99)
HBA1C MFR BLD: 5.5 % (ref 4.8–5.6)
HDLC SERPL-MCNC: 32 MG/DL (ref 40–60)
LDLC SERPL CALC-MCNC: 75 MG/DL (ref 0–100)
LDLC/HDLC SERPL: 2.23 {RATIO}
POTASSIUM SERPL-SCNC: 4.3 MMOL/L (ref 3.5–5.2)
SODIUM SERPL-SCNC: 141 MMOL/L (ref 136–145)
TRIGL SERPL-MCNC: 144 MG/DL (ref 0–150)
VLDLC SERPL CALC-MCNC: 25 MG/DL (ref 5–40)

## 2020-12-16 ENCOUNTER — OFFICE VISIT (OUTPATIENT)
Dept: FAMILY MEDICINE CLINIC | Facility: CLINIC | Age: 50
End: 2020-12-16

## 2020-12-16 VITALS
RESPIRATION RATE: 18 BRPM | TEMPERATURE: 97.3 F | HEART RATE: 93 BPM | HEIGHT: 69 IN | SYSTOLIC BLOOD PRESSURE: 130 MMHG | WEIGHT: 209 LBS | OXYGEN SATURATION: 94 % | BODY MASS INDEX: 30.96 KG/M2 | DIASTOLIC BLOOD PRESSURE: 84 MMHG

## 2020-12-16 DIAGNOSIS — E78.5 HYPERLIPIDEMIA, UNSPECIFIED HYPERLIPIDEMIA TYPE: ICD-10-CM

## 2020-12-16 DIAGNOSIS — I10 ESSENTIAL HYPERTENSION: Primary | ICD-10-CM

## 2020-12-16 DIAGNOSIS — G47.33 OSA (OBSTRUCTIVE SLEEP APNEA): ICD-10-CM

## 2020-12-16 DIAGNOSIS — E78.6 LOW HDL (UNDER 40): ICD-10-CM

## 2020-12-16 DIAGNOSIS — I65.21 STENOSIS OF RIGHT CAROTID ARTERY: ICD-10-CM

## 2020-12-16 PROBLEM — R03.0 ELEVATED BLOOD PRESSURE READING WITHOUT DIAGNOSIS OF HYPERTENSION: Status: RESOLVED | Noted: 2017-08-15 | Resolved: 2020-12-16

## 2020-12-16 PROCEDURE — 99214 OFFICE O/P EST MOD 30 MIN: CPT | Performed by: INTERNAL MEDICINE

## 2020-12-16 NOTE — PATIENT INSTRUCTIONS
Blood pressure is close to normal when checked a second time today.  Total and LDL cholesterol and triglycerides are normal.  HDL cholesterol remains low.  Hemoglobin A1c is normal.  We will also check a prostate-specific antigen and hepatitis C..  Discussed importance of a regular cardiovascular exercise program per AHA guidelines to control blood pressure and increase HDL cholesterol.

## 2020-12-16 NOTE — PROGRESS NOTES
Subjective   Eugene Shabazz is a 50 y.o. male. Patient is here today for   Chief Complaint   Patient presents with   • Hyperlipidemia   • Hypertension          Vitals:    12/16/20 0831   BP: 130/84   Pulse: 93   Resp: 18   Temp: 97.3 °F (36.3 °C)   SpO2: 94%     Body mass index is 30.86 kg/m².      The following portions of the patient's history were reviewed and updated as appropriate: allergies, current medications, past family history, past medical history, past social history, past surgical history and problem list.    Past Medical History:   Diagnosis Date   • Hyperlipidemia    • Hypertension    • Injury of back     2 lumbar surgeries, lamiectomies, most recent 5 years ago - good resolution    • Narcolepsy    • Neck pain    • Peripheral neuropathy    • Sleep apnea       No Known Allergies   Social History     Socioeconomic History   • Marital status:      Spouse name: Not on file   • Number of children: Not on file   • Years of education: Not on file   • Highest education level: Not on file   Tobacco Use   • Smoking status: Never Smoker   • Smokeless tobacco: Never Used   Substance and Sexual Activity   • Alcohol use: Yes   • Drug use: No   • Sexual activity: Defer        Current Outpatient Medications:   •  ALPRAZolam (XANAX) 0.5 MG tablet, Take 1 tablet by mouth 2 (Two) Times a Day As Needed for Anxiety., Disp: 30 tablet, Rfl: 2  •  atorvastatin (LIPITOR) 40 MG tablet, TAKE ONE TABLET BY MOUTH DAILY, Disp: 90 tablet, Rfl: 3  •  ibuprofen (ADVIL,MOTRIN) 800 MG tablet, Take 800 mg by mouth Every 6 (Six) Hours As Needed for Mild Pain ., Disp: , Rfl:   •  losartan (COZAAR) 25 MG tablet, Take 1 tablet by mouth Daily., Disp: 90 tablet, Rfl: 3  •  mupirocin (Bactroban) 2 % ointment, Apply to affected area two times a day for 10-14 days, Disp: 15 g, Rfl: 0     Objective   History of Present Illness Eugene is here for blood pressure check and lab follow-up.  He has hypertension, hyperlipidemia, low HDL  cholesterol, obstructive sleep apnea.  And carotid artery disease.  He feels well.  He eats healthy but does not exercise.  His weight is up some from 6 months ago.  He monitors his blood pressure occasionally.  He is compliant with CPAP.  He did a Cologuard this past year which was negative.  He had vascular screening November 2019 which showed right carotid artery disease without significant stenosis.    Review of Systems   Constitutional: Negative for activity change.        Weight increased 4 pounds   Respiratory: Negative for cough and shortness of breath.    Cardiovascular: Negative.    Gastrointestinal: Negative.    Genitourinary: Negative.    Neurological: Negative.    Psychiatric/Behavioral: Negative.        Physical Exam  Vitals signs reviewed.   Constitutional:       Appearance: Normal appearance.   Neck:      Vascular: No carotid bruit.   Cardiovascular:      Rate and Rhythm: Normal rate and regular rhythm.      Heart sounds: Normal heart sounds.      Comments: 124/78  Pulmonary:      Effort: Pulmonary effort is normal.      Breath sounds: Normal breath sounds.   Neurological:      Mental Status: He is alert.   Psychiatric:         Mood and Affect: Mood normal.         Behavior: Behavior normal.         Thought Content: Thought content normal.         Judgment: Judgment normal.         ASSESSMENT     Problems Addressed this Visit        Cardiovascular and Mediastinum    Hyperlipidemia    Essential hypertension - Primary       Respiratory    GERMAN (obstructive sleep apnea)       Other    Low HDL (under 40)      Diagnoses       Codes Comments    Essential hypertension    -  Primary ICD-10-CM: I10  ICD-9-CM: 401.9     Hyperlipidemia, unspecified hyperlipidemia type     ICD-10-CM: E78.5  ICD-9-CM: 272.4     Low HDL (under 40)     ICD-10-CM: E78.6  ICD-9-CM: 272.5     GERMAN (obstructive sleep apnea)     ICD-10-CM: G47.33  ICD-9-CM: 327.23           PLAN  There are no Patient Instructions on file for this  visit.    No follow-ups on file.

## 2020-12-17 LAB
HCV AB S/CO SERPL IA: <0.1 S/CO RATIO (ref 0–0.9)
Lab: NORMAL
PSA SERPL-MCNC: 0.65 NG/ML (ref 0–4)
WRITTEN AUTHORIZATION: NORMAL

## 2021-03-30 ENCOUNTER — BULK ORDERING (OUTPATIENT)
Dept: CASE MANAGEMENT | Facility: OTHER | Age: 51
End: 2021-03-30

## 2021-03-30 DIAGNOSIS — Z23 IMMUNIZATION DUE: ICD-10-CM

## 2021-04-06 ENCOUNTER — TELEPHONE (OUTPATIENT)
Dept: NEUROSURGERY | Facility: CLINIC | Age: 51
End: 2021-04-06

## 2021-04-06 NOTE — PROGRESS NOTES
Subjective   Patient ID: Eugene Shabazz is a 51 y.o. male is being seen for consultation today at the request of Adarsh Corbett MD for neck pain and n/t in hands.     He has seen Dr. Garibay in the past, he had PT and GABINO with no relief .    He had two previous lumbar surgeries.    Today, he states pain is more left sided but can be on both.  He also has occipital pain. Mr. Flower takes Ibuprofen prn for pain.   Patient reports progression of his neck pain and bilateral arm pain with left hurting worse than right.  He states that his neck pain radiates down into his shoulders all the way down into his little finger.  He reports numbness and tingling in same pain distribution.  He has undergone physical therapy and GABINO injections that have not helped in the past.  He is really having hard time sleeping due to the pain and radicular symptoms.  He also is having occipital headaches pretty regularly.  Patient also reports that when he turns his head to the left he will get dizzy and lightheaded.    Neck Pain   This is a chronic problem. The current episode started more than 1 year ago. The problem occurs daily. The problem has been gradually worsening. The pain is associated with nothing. The pain is present in the left side, midline, right side and occipital region. The quality of the pain is described as aching and burning. The pain is moderate. The symptoms are aggravated by position. The pain is worse during the night. Associated symptoms include headaches and numbness. Pertinent negatives include no leg pain or weakness. He has tried NSAIDs for the symptoms. The treatment provided mild relief.       The following portions of the patient's history were reviewed and updated as appropriate: allergies, current medications, past family history, past medical history, past social history, past surgical history and problem list.    Review of Systems   Eyes: Negative for visual disturbance.   Genitourinary:  "Positive for testicular pain. Negative for difficulty urinating and urgency.   Musculoskeletal: Positive for neck pain and neck stiffness. Negative for back pain and gait problem.   Neurological: Positive for light-headedness, numbness and headaches. Negative for dizziness and weakness.   All other systems reviewed and are negative.          Objective     Vitals:    04/28/21 0930   BP: 142/89   Pulse: 73   Temp: 99.1 °F (37.3 °C)   Weight: 95.6 kg (210 lb 12.8 oz)   Height: 175.3 cm (69\")     Body mass index is 31.13 kg/m².      Physical Exam  Vitals reviewed.   Eyes:      Pupils: Pupils are equal, round, and reactive to light.   Pulmonary:      Effort: Pulmonary effort is normal.   Neurological:      Mental Status: He is oriented to person, place, and time.      Coordination: Finger-Nose-Finger Test and Romberg Test normal.      Gait: Gait is intact.      Deep Tendon Reflexes:      Reflex Scores:       Tricep reflexes are 1+ on the left side.  Psychiatric:         Speech: Speech normal.       Neurologic Exam     Mental Status   Oriented to person, place, and time.   Speech: speech is normal   Level of consciousness: alert    Cranial Nerves     CN III, IV, VI   Pupils are equal, round, and reactive to light.    Motor Exam     Strength   Strength 5/5 except as noted.   Right strength:         Sensory Exam   Right arm light touch: decreased from fingers  Left arm light touch: decreased from fingers    Gait, Coordination, and Reflexes     Gait  Gait: normal    Coordination   Romberg: negative  Finger to nose coordination: normal    Reflexes   Reflexes 2+ except as noted.   Left triceps: 1+  Right Ko: absent  Left Ko: absent  Right ankle clonus: absent  Left pendular knee jerk: absent  wrist extension 4+/5 right and left.    Decreased ROM left and right   Positive Spurling's  Negative Lhermitte's    Assessment/Plan   Independent Review of Radiographic Studies:      I personally reviewed the images from the " following studies.    MRI 2017 cervical spine    Multilevel spondylosis most noted at C5-C6 and C6-C7.  Uncovertebral joint hypertrophy and facet arthropathy combined at this level to produce severe left and right neuroforaminal stenosis at C6-C7 and moderate right over left neuroforaminal stenosis at C5-C6.    Medical Decision Making:    Mr. Shabazz is a 51-year-old male being seen as a new patient to clinic for neck pain.  Patient had very subtle bilateral wrist extension weakness.  He had no other weakness or red flags.  I did review his 2017 MRI of the cervical spine where he did have DDD and DJD at C5 and C6 and worse at C6-C7.  His clinical presentation is concordant with these levels.  This has been worsening and affecting his sleep and daily activities.   He has undergone physical therapy and GABINO's with no relief. Patient also felt to be suffering from some cervicogenic headache.  Patient will be sent for CT myelogram of his cervical spine for further evaluation and followed up with Dr. Hernandez.  I will also place him on a short course of some muscle relaxer he can take with his ibuprofen at night to help with his sleep.    Diagnoses and all orders for this visit:    1. Cervical radiculopathy (Primary)  -     No Lab Testing Needed; Standing  -     dexamethasone (DECADRON) 4 MG tablet; Take 2 tablets by mouth Take As Directed. Take both tablets by mouth 2 hours before myelogram  Dispense: 2 tablet; Refill: 0  -     IR Myelogram Cervical Spine; Future  -     XR Spine Cervical Complete With Flex Ext; Future    2. Degenerative disc disease, cervical    3. Facet arthropathy, cervical    Other orders  -     cyclobenzaprine (FLEXERIL) 10 MG tablet; Take 1 tablet by mouth At Night As Needed for Muscle Spasms.  Dispense: 30 tablet; Refill: 0      Return for Next scheduled follow up.        This patient was examined wearing appropriate personal protective equipment.     Eugene Shabazz  reports that he has  never smoked. He has never used smokeless tobacco..  Pt reports no tobacco use. Continued cessation recommended.     Pts Blood pressure reviewed. Recommendations for  a low-salt diet and exercise to maintain/improve BP in addition to taking any prescribed medications.      Patient's Body mass index is 31.13 kg/m². BMI is above normal parameters. Recommendations include: exercise counseling and nutrition counseling.      JAYJAY Amaya  04/28/21  10:37 EDT

## 2021-04-06 NOTE — TELEPHONE ENCOUNTER
I called pt to see If he has had previous lumbar surgeries. If so, needing to know when and by whom.

## 2021-04-07 NOTE — TELEPHONE ENCOUNTER
Caller: Eugene Shabazz    Relationship to patient: Self    Best call back number: [phone]    Patient is needing: PATIENT IS RETURNING CALL. PATIENT STATED HE HAS HAD TWO LUMBAR SURGERYS.     FIRST SURGERY WAS LUMBAR LAMINECTOMY L5-S1, RIGHT ON 10- @ [facility] BY DR. LUNA.     SECOND SURGERY WAS LEFT SIDED LAMINECTOMY AND DISCECTOMY AT L5/S1 ON 05- @  [facility] BY DR. JO.    BOTH PROCEDURE'S FOUND IN ALLSCRIPTS.

## 2021-04-28 ENCOUNTER — OFFICE VISIT (OUTPATIENT)
Dept: NEUROSURGERY | Facility: CLINIC | Age: 51
End: 2021-04-28

## 2021-04-28 VITALS
DIASTOLIC BLOOD PRESSURE: 89 MMHG | HEIGHT: 69 IN | WEIGHT: 210.8 LBS | SYSTOLIC BLOOD PRESSURE: 142 MMHG | BODY MASS INDEX: 31.22 KG/M2 | TEMPERATURE: 99.1 F | HEART RATE: 73 BPM

## 2021-04-28 DIAGNOSIS — M47.812 FACET ARTHROPATHY, CERVICAL: ICD-10-CM

## 2021-04-28 DIAGNOSIS — M54.12 CERVICAL RADICULOPATHY: Primary | ICD-10-CM

## 2021-04-28 DIAGNOSIS — M50.30 DEGENERATIVE DISC DISEASE, CERVICAL: ICD-10-CM

## 2021-04-28 PROCEDURE — 99204 OFFICE O/P NEW MOD 45 MIN: CPT | Performed by: NURSE PRACTITIONER

## 2021-04-28 RX ORDER — DEXAMETHASONE 4 MG/1
8 TABLET ORAL TAKE AS DIRECTED
Qty: 2 TABLET | Refills: 0 | Status: SHIPPED | OUTPATIENT
Start: 2021-04-28 | End: 2021-04-28

## 2021-04-28 RX ORDER — CYCLOBENZAPRINE HCL 10 MG
10 TABLET ORAL NIGHTLY PRN
Qty: 30 TABLET | Refills: 0 | Status: SHIPPED | OUTPATIENT
Start: 2021-04-28 | End: 2021-05-28 | Stop reason: SDUPTHER

## 2021-04-28 RX ORDER — DEXAMETHASONE 4 MG/1
8 TABLET ORAL TAKE AS DIRECTED
Qty: 2 TABLET | Refills: 0 | Status: SHIPPED | OUTPATIENT
Start: 2021-04-28 | End: 2021-08-30

## 2021-04-28 RX ORDER — IBUPROFEN 200 MG
200 TABLET ORAL EVERY 6 HOURS PRN
COMMUNITY

## 2021-05-10 ENCOUNTER — TELEPHONE (OUTPATIENT)
Dept: NEUROSURGERY | Facility: CLINIC | Age: 51
End: 2021-05-10

## 2021-05-10 NOTE — TELEPHONE ENCOUNTER
Caller: Eugene Shabazz    Relationship to patient: Self    Best call back number: [phone]    Chief complaint:MYELOGRAM APPT.    Type of visit:MYELOGRAM    Requested date:ASAP    If rescheduling, when is the original appointment:NA    Additional notes:PT CALLED AND STATED THAT HE WAS SEEN IN OUR OFFICE ON 04/28/21. PT STATED HE WAS SUPPOSED TO HAVE RECEIVED A CALL FOR APPT. DATE WITHIN THREE DAYS. PT WAS CALLING BACK ABOUT SCHEDULING PLEASE ADVISE THANK YOU

## 2021-05-11 DIAGNOSIS — M54.12 CERVICAL RADICULOPATHY: Primary | ICD-10-CM

## 2021-05-11 NOTE — TELEPHONE ENCOUNTER
I returned the patient phone call and gave him scheduling phone number. Reminded patient to schedule his myelogram 1-2 weeks prior to his appointment on 6-.

## 2021-05-11 NOTE — TELEPHONE ENCOUNTER
Caller: Eugene Shabazz    Relationship: Self    Best call back number: [phone]    What orders are you requesting (i.e. lab or imaging): CT MYELOGRAM    In what timeframe would the patient need to come in: ASAP    Where will you receive your lab/imaging services: -[facility]    Additional notes: PT CALLED AND STATES THAT HE WAS TOLD THAT HE WAS TO BE SCHEDULED FOR A CT-MYELOGRAM AND WHEN HE CONTACTED [facility] THEY STATED THE ORDER WAS FOR A ROUTINE CT-SCAN. PT IS WANTING TO CLARIFY WHICH IMAGING HE IS TO RECEIVE AS HE IS SCHEDULED FOR A ROUTINE CT ON 06/21/2021. PT STATES HE WAS ALSO TOLD HE WOULD NEED A CHEST XRAY AS WELL.    PLEASE CONTACT PT REGARDING THIS MATTER, QUESTIONS OR CONCERNS.    THANK YOU!

## 2021-05-12 NOTE — TELEPHONE ENCOUNTER
I called the patient and let him know the orders have been updated and corrected and he can call scheduling to schedule his myleogram.

## 2021-06-01 RX ORDER — CYCLOBENZAPRINE HCL 10 MG
10 TABLET ORAL NIGHTLY PRN
Qty: 30 TABLET | Refills: 0 | Status: SHIPPED | OUTPATIENT
Start: 2021-06-01 | End: 2021-07-01 | Stop reason: SDUPTHER

## 2021-06-12 NOTE — PROGRESS NOTES
06/21/21 0000   Pre-Procedure Phone Call   Procedure Time Verified Yes   Arrival Time 0800   Procedure Location Verified Yes   Medical History Reviewed No   NPO Status Reinforced Yes   Ride and Caregiver Arranged Yes   Patient Knows to Bring Current Medications   (medications reviewed and current / Decadron RX'd)   Bring Outside Films Requested   (No imaging in several years per patient)

## 2021-06-21 ENCOUNTER — HOSPITAL ENCOUNTER (OUTPATIENT)
Dept: CT IMAGING | Facility: HOSPITAL | Age: 51
Discharge: HOME | End: 2021-06-21

## 2021-06-21 ENCOUNTER — HOSPITAL ENCOUNTER (OUTPATIENT)
Dept: GENERAL RADIOLOGY | Facility: HOSPITAL | Age: 51
Discharge: HOME | End: 2021-06-21

## 2021-06-21 VITALS
HEIGHT: 69 IN | HEART RATE: 84 BPM | RESPIRATION RATE: 18 BRPM | TEMPERATURE: 98 F | OXYGEN SATURATION: 96 % | WEIGHT: 209 LBS | SYSTOLIC BLOOD PRESSURE: 129 MMHG | BODY MASS INDEX: 30.96 KG/M2 | DIASTOLIC BLOOD PRESSURE: 78 MMHG

## 2021-06-21 DIAGNOSIS — M54.12 CERVICAL RADICULOPATHY: ICD-10-CM

## 2021-06-21 PROCEDURE — 25010000003 LIDOCAINE 1 % SOLUTION: Performed by: RADIOLOGY

## 2021-06-21 PROCEDURE — 72126 CT NECK SPINE W/DYE: CPT

## 2021-06-21 PROCEDURE — 25010000002 IOPAMIDOL 61 % SOLUTION: Performed by: RADIOLOGY

## 2021-06-21 PROCEDURE — 62302 MYELOGRAPHY LUMBAR INJECTION: CPT

## 2021-06-21 PROCEDURE — 72052 X-RAY EXAM NECK SPINE 6/>VWS: CPT

## 2021-06-21 PROCEDURE — 72240 MYELOGRAPHY NECK SPINE: CPT

## 2021-06-21 RX ORDER — SODIUM CHLORIDE 0.9 % (FLUSH) 0.9 %
10 SYRINGE (ML) INJECTION AS NEEDED
Status: DISCONTINUED | OUTPATIENT
Start: 2021-06-21 | End: 2021-06-22 | Stop reason: HOSPADM

## 2021-06-21 RX ORDER — LIDOCAINE HYDROCHLORIDE 10 MG/ML
10 INJECTION, SOLUTION INFILTRATION; PERINEURAL ONCE
Status: DISCONTINUED | OUTPATIENT
Start: 2021-06-21 | End: 2021-06-21

## 2021-06-21 RX ORDER — SODIUM CHLORIDE 0.9 % (FLUSH) 0.9 %
3 SYRINGE (ML) INJECTION EVERY 12 HOURS SCHEDULED
Status: DISCONTINUED | OUTPATIENT
Start: 2021-06-21 | End: 2021-06-22 | Stop reason: HOSPADM

## 2021-06-21 RX ORDER — LIDOCAINE HYDROCHLORIDE 10 MG/ML
10 INJECTION, SOLUTION INFILTRATION; PERINEURAL ONCE
Status: COMPLETED | OUTPATIENT
Start: 2021-06-21 | End: 2021-06-21

## 2021-06-21 RX ADMIN — IOPAMIDOL 12 ML: 612 INJECTION, SOLUTION INTRATHECAL at 09:11

## 2021-06-21 RX ADMIN — LIDOCAINE HYDROCHLORIDE 2 ML: 10 INJECTION, SOLUTION INFILTRATION; PERINEURAL at 09:09

## 2021-06-21 NOTE — H&P
Name: Eugene Shabazz ADMIT: 2021   : 1970  PCP: Adarsh Corbett MD    MRN: [id number] LOS: 0 days   AGE/SEX: 51 y.o. male  ROOM: Room/bed info not found       Chief complaint   Patient is a 51 y.o. male presents with cervical radiculopathy.     Past Surgical History:  Past Surgical History:   Procedure Laterality Date   • BACK SURGERY      lumbar x2  DR. BENNIE LUNA and  DR. JO   • EPIDURAL BLOCK     • HERNIA REPAIR         Past Medical History:  Past Medical History:   Diagnosis Date   • Hyperlipidemia    • Hypertension    • Injury of back     2 lumbar surgeries, lamiectomies, most recent 5 years ago - good resolution    • Narcolepsy    • Neck pain    • Peripheral neuropathy    • Sleep apnea        Home Medications:  (Not in a hospital admission)      Allergies:  Patient has no known allergies.    Family History:  Family History   Problem Relation Age of Onset   • No Known Problems Mother    • Heart disease Father        Social History:  Social History     Tobacco Use   • Smoking status: Never Smoker   • Smokeless tobacco: Never Used   Substance Use Topics   • Alcohol use: Yes   • Drug use: No        Objective     Physical Exam:   R/r/r, ctab    Vital Signs       Anticipated Surgical Procedure:  myelogram    The risks, benefits and alternatives of this procedure have been discussed with the patient or responsible party: Yes        Owen Woodall MD  21  07:49 EDT

## 2021-06-21 NOTE — DISCHARGE INSTRUCTIONS
EDUCATION /DISCHARGE INSTRUCTIONS:  A myelogram is a special radiology procedure of the spinal cord, spinal nerves and other related structures.  You will be awake during the examination.  An area of your lower back will be cleansed with an antiseptic solution.  The physician will inject a numbing medication in your lower back.  While your back is numb, a needle will be placed in the lower back area.  A small amount of spinal fluid may be withdrawn and sent to the lab if ordered by your physician. While the needle is in the back, an injection of a contrast material (xray dye) will be given through the needle.  The contrast material will allow the physician to see the spinal cord and spinal nerves.  Once injected, the needle will be removed and a band aid will be placed over the injection site.  The table will be tilted during the process to allow the contrast material to flow to particular areas in the spine.  Following the injection and xrays, you will be taken to the CT scan where more pictures will be taken. After the procedure is finished, the contrast material will be absorbed by your body and eliminated through your kidneys.  The radiologist will study and interpret your myelogram and send the results to your physician.  Procedure risks of a myelogram include, but are not limited to:  *  Bleeding   *  Seizure  *  Infection   *  Headache, possibly severe requiring a blood patch  *  Contrast reaction  *  Nerve or cord injury  *  Paralysis and death    Benefits of the procedure:    Best examination for delineating pathology related to spinal cord compression from a disc and/or nerve root compression  Alternatives to the procedure:MRI - a non invasive procedure requiring intravenous contrast injection. Cannot be done on patients with certain pacemakers or metal in the body.  MRI risks include possible reaction to the contrast material, movement of metal located in the body.   Benefit to MRI:  Non-invasive and  usually painless procedure.  THIS EDUCATION INFORMATION WAS REVIEWED PRIOR TO THE PROCEDURE AND CONSENT. Patient initials __________________Time______0815___________      Important information following your myelogram:    *  When you get home, lie down with no more than 2 pillows under your head.  *  Sit up in the car going home. At home, sit up to eat and use the restroom only, then lie back down.   *  24 HOURS COMPLETE AT ___1000 on 6/22/21_____________________   *  Tomorrow, after 24 hours completed, take it easy and rest the next 2-3 days.  *  Do not drive for 48 hours following a myelogram  *  You may remove the bandage and shower in the morning  *  Increase your fluids for the next 24 hours.  Caffeinated drinks are encouraged.Increase your intake of fluids the next 2-3 days      CALL YOUR PHYSICIAN FOR THE FOLLOWING:  * Pain at the injection site  * Redness, swelling, bruising or drainage at the injection site  * A fever by mouth of 101.0  * Any new symptoms  If you have problems with a headache that is not relieved with rest and medication, please call the Radiology Triage Nurse desk [phone]

## 2021-06-21 NOTE — NURSING NOTE
Patient arrived to radiology triage bay 4.  Patient is mask compliant.  I am wearing a mask and eye protection to care for patient. His wife is driving him home today.

## 2021-06-22 ENCOUNTER — TELEPHONE (OUTPATIENT)
Dept: INTERVENTIONAL RADIOLOGY/VASCULAR | Facility: HOSPITAL | Age: 51
End: 2021-06-22

## 2021-06-25 NOTE — PROGRESS NOTES
Subjective   Patient ID: Eugene Shabazz is a 51 y.o. male is here today for follow-up. Patient was last seen 4/28/21 by Adilene Wang for Cervical radiculopathy.    6/21/21  XR C spine [facility]  6/21/21  CT C spine [facility]  6/21/21  Myelogram  [facility]    Patient reports today To discuss results of imaging.    This very nice man is an .  He continues to work.  He has had two previous low back surgeries and he has done well with them.  He has had about a 2-3 year history of neck pain and bilateral arm pain with numbness and tingling.  He has gone through cervical epidural blocks and physical therapy.  The muscle relaxants that were given by our nurse practitioner last time have helped his neck particularly at night.  He uses ibuprofen as needed.  I reviewed his myelogram which does show cervical disc disease at C5-6 and C6-7 with osteophytic improvement.  He told me that the neck pain bothers him more than the arms and it is more of an aggravation, not anything that is absolutely overwhelming.  I told him as long as that was the case I did not really feel that doing a 2-level discectomy and fusion at C5-6 and C6-7 would be worth the risk relative to his complaints.  I told him I thought there was an in between option to deal with the neck pain specifically cervical radiofrequency ablation.  He is interested in trying that instead and we will send him to Dr. Leonard for that.  Another option is gabapentin for some of the nerve symptoms but he is an  and I told him it might affect his ability to work so we will hold off on that for right now.  We will have him come back in about 4 months to see how he is doing.  Only if the symptoms are intractable and really interfere significantly with his quality of life would I consider surgery.          History of Present Illness    The following portions of the patient's history were reviewed and updated as appropriate: allergies, current medications, past family history,  "past medical history, past social history, past surgical history and problem list.    Review of Systems   All other systems reviewed and are negative.          Objective     Vitals:    06/28/21 1019   BP: 128/72   Temp: 97.5 °F (36.4 °C)   Weight: 95.3 kg (210 lb)   Height: 175.3 cm (69\")     Body mass index is 31.01 kg/m².      Physical Exam  Constitutional:       Appearance: He is well-developed.   HENT:      Head: Normocephalic and atraumatic.   Eyes:      Extraocular Movements: EOM normal.      Conjunctiva/sclera: Conjunctivae normal.      Pupils: Pupils are equal, round, and reactive to light.   Neck:      Vascular: No carotid bruit.   Neurological:      Mental Status: He is oriented to person, place, and time.      Coordination: Finger-Nose-Finger Test and Heel to Shin Test normal.      Gait: Gait is intact.      Deep Tendon Reflexes:      Reflex Scores:       Tricep reflexes are 2+ on the right side and 2+ on the left side.       Bicep reflexes are 2+ on the right side and 2+ on the left side.       Brachioradialis reflexes are 2+ on the right side and 2+ on the left side.       Patellar reflexes are 2+ on the right side and 2+ on the left side.       Achilles reflexes are 2+ on the right side and 2+ on the left side.  Psychiatric:         Speech: Speech normal.       Neurologic Exam     Mental Status   Oriented to person, place, and time.   Registration of memory: Good recent and remote memory.   Attention: normal. Concentration: normal.   Speech: speech is normal   Level of consciousness: alert  Knowledge: consistent with education.     Cranial Nerves     CN II   Visual fields full to confrontation.   Visual acuity: normal    CN III, IV, VI   Pupils are equal, round, and reactive to light.  Extraocular motions are normal.     CN V   Facial sensation intact.   Right corneal reflex: normal  Left corneal reflex: normal    CN VII   Facial expression full, symmetric.   Right facial weakness: none  Left facial " weakness: none    CN VIII   Hearing: intact    CN IX, X   Palate: symmetric    CN XI   Right sternocleidomastoid strength: normal  Left sternocleidomastoid strength: normal    CN XII   Tongue: not atrophic  Tongue deviation: none    Motor Exam   Muscle bulk: normal  Right arm tone: normal  Left arm tone: normal  Right leg tone: normal  Left leg tone: normal    Strength   Strength 5/5 except as noted.     Sensory Exam   Light touch normal.     Gait, Coordination, and Reflexes     Gait  Gait: normal    Coordination   Finger to nose coordination: normal  Heel to shin coordination: normal    Reflexes   Right brachioradialis: 2+  Left brachioradialis: 2+  Right biceps: 2+  Left biceps: 2+  Right triceps: 2+  Left triceps: 2+  Right patellar: 2+  Left patellar: 2+  Right achilles: 2+  Left achilles: 2+  Right : 2+  Left : 2+          Assessment/Plan   Independent Review of Radiographic Studies:      I personally reviewed the images from the following studies.    I reviewed his cervical myelogram done on 6/21/2021 which shows osteophytic cervical disc disease at C5-C6 and C6-7 with bilateral foraminal root entrapment particularly at C6-C7.  There are some anterior osteophytes and loss of the cervical lordosis.  Agree with the report.        Medical Decision Making:      We will try cervical facet injections and a possible radiofrequency ablation.  We will hold off on gabapentin since it may affect his ability to think and work.  We will have him come back to see how he is doing in about 4 months.  If he feels that the neck and the arm pain disks are beyond what he can tolerate then that would be the time that we would consider a cervical discectomy and fusion.  He does not seem at that point right now.      Diagnoses and all orders for this visit:    1. Cervical disc disorder at C5-C6 level with radiculopathy (Primary)  -     Ambulatory Referral to Pain Management    2. Cervical disc disorder at C6-C7 level with  radiculopathy  -     Ambulatory Referral to Pain Management    3. Chronic neck pain  -     Ambulatory Referral to Pain Management      Return in about 4 months (around 10/28/2021) for Face-to-face.

## 2021-06-28 ENCOUNTER — OFFICE VISIT (OUTPATIENT)
Dept: NEUROSURGERY | Facility: CLINIC | Age: 51
End: 2021-06-28

## 2021-06-28 VITALS
SYSTOLIC BLOOD PRESSURE: 128 MMHG | BODY MASS INDEX: 31.1 KG/M2 | WEIGHT: 210 LBS | TEMPERATURE: 97.5 F | DIASTOLIC BLOOD PRESSURE: 72 MMHG | HEIGHT: 69 IN

## 2021-06-28 DIAGNOSIS — M54.2 CHRONIC NECK PAIN: ICD-10-CM

## 2021-06-28 DIAGNOSIS — M50.123 CERVICAL DISC DISORDER AT C6-C7 LEVEL WITH RADICULOPATHY: ICD-10-CM

## 2021-06-28 DIAGNOSIS — G89.29 CHRONIC NECK PAIN: ICD-10-CM

## 2021-06-28 DIAGNOSIS — M50.122 CERVICAL DISC DISORDER AT C5-C6 LEVEL WITH RADICULOPATHY: Primary | ICD-10-CM

## 2021-06-28 PROCEDURE — 99214 OFFICE O/P EST MOD 30 MIN: CPT | Performed by: NEUROLOGICAL SURGERY

## 2021-07-02 RX ORDER — CYCLOBENZAPRINE HCL 10 MG
10 TABLET ORAL NIGHTLY PRN
Qty: 30 TABLET | Refills: 0 | Status: SHIPPED | OUTPATIENT
Start: 2021-07-02 | End: 2021-07-30 | Stop reason: SDUPTHER

## 2021-07-02 NOTE — TELEPHONE ENCOUNTER
Patient called regarding his refill. He is aware a message has already been sent. He is completely out of the medication.

## 2021-07-30 ENCOUNTER — TELEPHONE (OUTPATIENT)
Dept: FAMILY MEDICINE CLINIC | Facility: CLINIC | Age: 51
End: 2021-07-30

## 2021-07-30 DIAGNOSIS — E78.5 HYPERLIPIDEMIA, UNSPECIFIED HYPERLIPIDEMIA TYPE: Primary | ICD-10-CM

## 2021-07-30 RX ORDER — CYCLOBENZAPRINE HCL 10 MG
10 TABLET ORAL NIGHTLY PRN
Qty: 30 TABLET | Refills: 0 | Status: SHIPPED | OUTPATIENT
Start: 2021-07-30 | End: 2021-08-29 | Stop reason: SDUPTHER

## 2021-07-30 NOTE — TELEPHONE ENCOUNTER
Caller: Eugene Shabazz    Relationship: Self    Best call back number: [phone]    What orders are you requesting (i.e. lab or imaging): LAB WORK    In what timeframe would the patient need to come in: AUGUST 2-AUGUST 6 2021, FOLLOW UP APPOINTMENT ALREADY SCHEDULED     Where will you receive your lab/imaging services: [facility]     Additional notes:     PATIENT WAS SCHEDULED BACK IN June AROUND THE 18TH FOR LABS AND THEN A FOLLOW UP APPOINTMENT. PATIENT WAS UNABLE TO MAKE THOSE APPOINTMENTS AND WOULD LIKE TO GET BACK ON SCHEDULE FOR THESE. PLEASE CALL AND ADVISE PATIENT.

## 2021-08-06 LAB
ALBUMIN SERPL-MCNC: 4.7 G/DL (ref 3.5–5.2)
ALBUMIN/GLOB SERPL: 2.8 G/DL
ALP SERPL-CCNC: 64 U/L (ref 39–117)
ALT SERPL-CCNC: 43 U/L (ref 1–41)
AST SERPL-CCNC: 33 U/L (ref 1–40)
BILIRUB SERPL-MCNC: 0.9 MG/DL (ref 0–1.2)
BUN SERPL-MCNC: 19 MG/DL (ref 6–20)
BUN/CREAT SERPL: 17.3 (ref 7–25)
CALCIUM SERPL-MCNC: 9.7 MG/DL (ref 8.6–10.5)
CHLORIDE SERPL-SCNC: 103 MMOL/L (ref 98–107)
CHOLEST SERPL-MCNC: 136 MG/DL (ref 0–200)
CO2 SERPL-SCNC: 28 MMOL/L (ref 22–29)
CREAT SERPL-MCNC: 1.1 MG/DL (ref 0.76–1.27)
GLOBULIN SER CALC-MCNC: 1.7 GM/DL
GLUCOSE SERPL-MCNC: 96 MG/DL (ref 65–99)
HDLC SERPL-MCNC: 31 MG/DL (ref 40–60)
LDLC SERPL CALC-MCNC: 71 MG/DL (ref 0–100)
LDLC/HDLC SERPL: 2.09 {RATIO}
POTASSIUM SERPL-SCNC: 4.6 MMOL/L (ref 3.5–5.2)
PROT SERPL-MCNC: 6.4 G/DL (ref 6–8.5)
SODIUM SERPL-SCNC: 139 MMOL/L (ref 136–145)
TRIGL SERPL-MCNC: 201 MG/DL (ref 0–150)
VLDLC SERPL CALC-MCNC: 34 MG/DL (ref 5–40)

## 2021-08-10 ENCOUNTER — ANESTHESIA (OUTPATIENT)
Dept: PAIN MEDICINE | Facility: HOSPITAL | Age: 51
End: 2021-08-10

## 2021-08-10 ENCOUNTER — HOSPITAL ENCOUNTER (OUTPATIENT)
Dept: PAIN MEDICINE | Facility: HOSPITAL | Age: 51
Discharge: HOME | End: 2021-08-10

## 2021-08-10 ENCOUNTER — HOSPITAL ENCOUNTER (OUTPATIENT)
Dept: GENERAL RADIOLOGY | Facility: HOSPITAL | Age: 51
Discharge: HOME | End: 2021-08-10

## 2021-08-10 ENCOUNTER — ANESTHESIA EVENT (OUTPATIENT)
Dept: PAIN MEDICINE | Facility: HOSPITAL | Age: 51
End: 2021-08-10

## 2021-08-10 VITALS
DIASTOLIC BLOOD PRESSURE: 83 MMHG | RESPIRATION RATE: 16 BRPM | TEMPERATURE: 97.5 F | SYSTOLIC BLOOD PRESSURE: 121 MMHG | WEIGHT: 209 LBS | HEART RATE: 67 BPM | BODY MASS INDEX: 30.96 KG/M2 | HEIGHT: 69 IN | OXYGEN SATURATION: 96 %

## 2021-08-10 DIAGNOSIS — R52 PAIN: ICD-10-CM

## 2021-08-10 DIAGNOSIS — M43.12 SPONDYLOLISTHESIS, CERVICAL REGION: Primary | ICD-10-CM

## 2021-08-10 PROCEDURE — 25010000002 ROPIVACAINE PER 1 MG: Performed by: ANESTHESIOLOGY

## 2021-08-10 PROCEDURE — 77003 FLUOROGUIDE FOR SPINE INJECT: CPT

## 2021-08-10 RX ORDER — LIDOCAINE HYDROCHLORIDE 10 MG/ML
1 INJECTION, SOLUTION INFILTRATION; PERINEURAL ONCE
Status: DISCONTINUED | OUTPATIENT
Start: 2021-08-10 | End: 2021-08-11 | Stop reason: HOSPADM

## 2021-08-10 RX ORDER — SODIUM CHLORIDE 0.9 % (FLUSH) 0.9 %
3 SYRINGE (ML) INJECTION EVERY 12 HOURS SCHEDULED
Status: DISCONTINUED | OUTPATIENT
Start: 2021-08-10 | End: 2021-08-11 | Stop reason: HOSPADM

## 2021-08-10 RX ORDER — SODIUM CHLORIDE 0.9 % (FLUSH) 0.9 %
3-10 SYRINGE (ML) INJECTION AS NEEDED
Status: DISCONTINUED | OUTPATIENT
Start: 2021-08-10 | End: 2021-08-11 | Stop reason: HOSPADM

## 2021-08-10 RX ORDER — ROPIVACAINE HYDROCHLORIDE 5 MG/ML
10 INJECTION, SOLUTION EPIDURAL; INFILTRATION; PERINEURAL ONCE
Status: COMPLETED | OUTPATIENT
Start: 2021-08-10 | End: 2021-08-10

## 2021-08-10 RX ORDER — FENTANYL CITRATE 50 UG/ML
50 INJECTION, SOLUTION INTRAMUSCULAR; INTRAVENOUS AS NEEDED
Status: DISCONTINUED | OUTPATIENT
Start: 2021-08-10 | End: 2021-08-11 | Stop reason: HOSPADM

## 2021-08-10 RX ORDER — MIDAZOLAM HYDROCHLORIDE 1 MG/ML
1 INJECTION INTRAMUSCULAR; INTRAVENOUS AS NEEDED
Status: DISCONTINUED | OUTPATIENT
Start: 2021-08-10 | End: 2021-08-11 | Stop reason: HOSPADM

## 2021-08-10 RX ADMIN — ROPIVACAINE HYDROCHLORIDE 10 ML: 5 INJECTION, SOLUTION EPIDURAL; INFILTRATION; PERINEURAL at 09:28

## 2021-08-10 NOTE — ANESTHESIA PROCEDURE NOTES
Bilateral cervical facet medial branch blocks to cover the levels of C5-6 and C6-7    Pre-sedation assessment completed: 8/10/2021 9:12 AM    Patient reassessed immediately prior to procedure    Patient location during procedure: Pain Clinic  Start time: 8/10/2021 9:15 AM  Stop Time: 8/10/2021 9:29 AM    Reason for block: procedure for pain  Performed by  Anesthesiologist: Toby Leonard MD  Preanesthetic Checklist  Completed: patient identified, IV checked, site marked, risks and benefits discussed, surgical consent, monitors and equipment checked, pre-op evaluation and timeout performed  Prep:  Patient position: prone  Sterile barriers:cap, gloves, mask and sterile barrier  Prep: ChloraPrep  Patient monitoring: blood pressure monitoring, continuous pulse oximetry and EKG  Procedure:  Sedation:no  Approach:bilateral  Guidance:fluoroscopy  Location:cervical  Interspace: to cover the levels of C5-6 and C6-7.  Needle Type:Quincke  Needle Gauge:25 G  Aspiration:negative  Medications:  Comment:With 0.5% Ropivacaine, 0.5 mls per injection site.     Post Assessment  Injection Assessment: negative  Patient Tolerance:comfortable throughout block  Complications:no  Additional Notes  Post-Op Diagnosis Codes:     * Spondylolisthesis, cervical region (M43.12)     * DDD (degenerative disc disease), cervical (M50.30)    The patient was observed in recovery with no evidence of neurological deficits or other problems. All questions were answered. The patient was discharged with appropriate instructions.

## 2021-08-10 NOTE — H&P
CHIEF COMPLAINT:  Neck pain        HISTORY OF PRESENT ILLNESS:  This patient is complaining of neck pain which radiates at times in both of his arms but also in a band across the back of his neck.  It ranges between a 6 and 8 out of 10 on the pain scale.  The pain is described as aching, intermittent, numb, pressure, sharp, and stabbing in nature. The pain is exacerbated by lifting, lying down, and relieved by massage, and pain medication.  The patient has tried conservative therapy for greater than 6 weeks including: Over-the-counter medications, prescription medications, and cervical epidural steroid injections.  The pain is significantly decreasing the patient's Activities of Daily Living.      Diagnostic imaging: CT myelogram of the cervical spine from 6/21/2021 shows spondylolisthesis and degenerative disc disease.  The worst levels seem to be C5-6 and C6-7, which was also commented as the worst levels by Dr. Derrick Hernandez.    This patient was referred to our clinic by Dr. Derrick Hernandez for bilateral cervical facet medial branch blocks to cover the levels of C5-6 and C6-7 with the possibility of future radiofrequency ablation if appropriate.    Prior cervical epidural steroid injections were not as helpful as the patient neurosurgeon would have liked, and they are requesting that we consider interventions for this patient's facets.     PAST MEDICAL HISTORY:  Current Outpatient Medications on File Prior to Encounter   Medication Sig Dispense Refill   • ALPRAZolam (XANAX) 0.5 MG tablet Take 1 tablet by mouth 2 (Two) Times a Day As Needed for Anxiety. 30 tablet 2   • atorvastatin (LIPITOR) 40 MG tablet TAKE ONE TABLET BY MOUTH DAILY 90 tablet 3   • cyclobenzaprine (FLEXERIL) 10 MG tablet Take 1 tablet by mouth At Night As Needed for Muscle Spasms. 30 tablet 0   • dexamethasone (DECADRON) 4 MG tablet Take 2 tablets by mouth Take As Directed. Take both tablets by mouth 2 hours before myelogram 2 tablet 0   •  ibuprofen (ADVIL,MOTRIN) 200 MG tablet Take 200 mg by mouth Every 6 (Six) Hours As Needed for Mild Pain .     • ibuprofen (ADVIL,MOTRIN) 800 MG tablet Take 800 mg by mouth Every 6 (Six) Hours As Needed for Mild Pain .     • losartan (COZAAR) 25 MG tablet Take 1 tablet by mouth Daily. 90 tablet 3     No current facility-administered medications on file prior to encounter.       Past Medical History:   Diagnosis Date   • Hyperlipidemia    • Hypertension    • Injury of back     2 lumbar surgeries, lamiectomies, most recent 5 years ago - good resolution    • Narcolepsy    • Neck pain    • Peripheral neuropathy    • Sleep apnea        SOCIAL HISTORY:  Counseled to avoid tobacco     REVIEW OF SYSTEMS:  No pertinent hematologic, infectious, or constitutional symptoms.  Other review of systems non-contributory     PHYSICAL EXAM:  There were no vitals taken for this visit.  Constitutional: Well-developed well-nourished no acute distress  General: Alert and oriented  Neuromuscular: He has decreased range of motion of his neck and pain primarily with extension and twisting.  Facet loading is positive bilaterally.  He has tenderness palpation over his bilateral cervical facets and paraspinal muscles.       DIAGNOSIS:  Post-Op Diagnosis Codes:  Post-Op Diagnosis Codes:     * Spondylolisthesis, cervical region [M43.12]     * DDD (degenerative disc disease), cervical [M50.30]     PLAN:  -Bilateral Cervical facet medial branch injections to cover the level(s) of C5-6 and C6-7 spaced approximately 2-4 weeks apart. These injections will be for diagnostic purposes. If pain control is acceptable but temporary, it was discussed with the patient that they may be a candidate for radiofrequency lesioning in the future.     - Risks were discussed with the patient, including but not limited to: failure of relief, worsening pain, seizure, tissue, nerve, or blood vessel damage, bleeding, infection, and abscess formation. Benefits and  alternatives were also discussed. No promises were made. The patient voiced understanding.  -  Physical therapy exercises at home should be considered, as tolerated.  -  It is recommended that the patient use the least amount of opioid medication possible.     -  Heat and ice to the affected area as tolerated for pain control.  It was discussed that heating pads can cause burns.  -  Daily low impact exercise such as walking or water exercise was recommended to maintain overall health and aid in weight control.   -  Patient was counseled to abstain from tobacco products.  -  Follow up as needed for subsequent injections.  -His last cervical epidural steroid injections were about a year ago.  We can certainly consider trying these again if necessary in the future.  For now, I will focus on his cervical facets as requested by the surgeon and patient.    Toby Leonard M.D.  [place], [facility], [facility]  Board Certified in Pain Medicine by the American Board of Anesthesiology  Board Certified in Anesthesiology by the American Board of Anesthesiology     Much of this encounter note is an electronic transcription/translation of spoken language to printed text. The electronic translation of spoken language may permit erroneous, or at times, nonsensical words or phrases to be inadvertently transcribed. Although I have reviewed the note for such errors, some may still exist.

## 2021-08-17 ENCOUNTER — OFFICE VISIT (OUTPATIENT)
Dept: FAMILY MEDICINE CLINIC | Facility: CLINIC | Age: 51
End: 2021-08-17

## 2021-08-17 VITALS
OXYGEN SATURATION: 97 % | SYSTOLIC BLOOD PRESSURE: 110 MMHG | HEART RATE: 93 BPM | RESPIRATION RATE: 18 BRPM | DIASTOLIC BLOOD PRESSURE: 70 MMHG | WEIGHT: 212.6 LBS | TEMPERATURE: 97.8 F | HEIGHT: 69 IN | BODY MASS INDEX: 31.49 KG/M2

## 2021-08-17 DIAGNOSIS — I10 ESSENTIAL HYPERTENSION: Primary | ICD-10-CM

## 2021-08-17 DIAGNOSIS — E78.6 LOW HDL (UNDER 40): ICD-10-CM

## 2021-08-17 DIAGNOSIS — I65.21 STENOSIS OF RIGHT CAROTID ARTERY: ICD-10-CM

## 2021-08-17 DIAGNOSIS — E78.5 HYPERLIPIDEMIA, UNSPECIFIED HYPERLIPIDEMIA TYPE: ICD-10-CM

## 2021-08-17 DIAGNOSIS — Z12.11 COLON CANCER SCREENING: ICD-10-CM

## 2021-08-17 PROCEDURE — 99214 OFFICE O/P EST MOD 30 MIN: CPT | Performed by: INTERNAL MEDICINE

## 2021-08-17 NOTE — PROGRESS NOTES
Subjective   Eugene Shabazz is a 51 y.o. male. Patient is here today for   Chief Complaint   Patient presents with   • Hyperlipidemia     lab fu          Vitals:    08/17/21 1457   BP: 110/70   Pulse: 93   Resp: 18   Temp: 97.8 °F (36.6 °C)   SpO2: 97%     Body mass index is 31.38 kg/m².      The following portions of the patient's history were reviewed and updated as appropriate: allergies, current medications, past family history, past medical history, past social history, past surgical history and problem list.    Past Medical History:   Diagnosis Date   • Hyperlipidemia    • Hypertension    • Injury of back     2 lumbar surgeries, lamiectomies, most recent 5 years ago - good resolution    • Narcolepsy    • Neck pain    • Peripheral neuropathy    • Sleep apnea    • Spondylolisthesis, cervical region 08/10/2021      No Known Allergies   Social History     Socioeconomic History   • Marital status:      Spouse name: Not on file   • Number of children: Not on file   • Years of education: Not on file   • Highest education level: Not on file   Tobacco Use   • Smoking status: Never Smoker   • Smokeless tobacco: Never Used   Substance and Sexual Activity   • Alcohol use: Yes   • Drug use: No   • Sexual activity: Defer        Current Outpatient Medications:   •  ALPRAZolam (XANAX) 0.5 MG tablet, Take 1 tablet by mouth 2 (Two) Times a Day As Needed for Anxiety., Disp: 30 tablet, Rfl: 2  •  atorvastatin (LIPITOR) 40 MG tablet, TAKE ONE TABLET BY MOUTH DAILY, Disp: 90 tablet, Rfl: 3  •  cyclobenzaprine (FLEXERIL) 10 MG tablet, Take 1 tablet by mouth At Night As Needed for Muscle Spasms., Disp: 30 tablet, Rfl: 0  •  dexamethasone (DECADRON) 4 MG tablet, Take 2 tablets by mouth Take As Directed. Take both tablets by mouth 2 hours before myelogram, Disp: 2 tablet, Rfl: 0  •  ibuprofen (ADVIL,MOTRIN) 800 MG tablet, Take 800 mg by mouth Every 6 (Six) Hours As Needed for Mild Pain ., Disp: , Rfl:   •  losartan (COZAAR)  25 MG tablet, Take 1 tablet by mouth Daily., Disp: 90 tablet, Rfl: 3  •  ibuprofen (ADVIL,MOTRIN) 200 MG tablet, Take 200 mg by mouth Every 6 (Six) Hours As Needed for Mild Pain ., Disp: , Rfl:      Objective   History of Present Illness Eugene is here for blood pressure check and lab follow-up.  He has hypertension, hyperlipidemia, low HDL, carotid artery disease.  He feels well.  He eats healthy but does not exercise.  He has cervical radiculopathy and recently got steroid injections which has helped.  He had vascular screening and November 2019 which showed right carotid artery disease with less than 50% stenosis.  He is 51 now needs a screening colonoscopy.  PSA testing was last December.    Review of Systems   Constitutional: Positive for activity change.   Respiratory: Negative.    Cardiovascular: Negative.    Musculoskeletal: Positive for neck pain.   Neurological: Negative.    Psychiatric/Behavioral: Negative.        Physical Exam  Vitals reviewed.   Constitutional:       Appearance: Normal appearance.   Neck:      Vascular: No carotid bruit.   Cardiovascular:      Rate and Rhythm: Normal rate and regular rhythm.      Heart sounds: Normal heart sounds.      Comments: 120/90  Pulmonary:      Effort: Pulmonary effort is normal.      Breath sounds: Normal breath sounds.   Neurological:      Mental Status: He is alert.   Psychiatric:         Mood and Affect: Mood normal.         Behavior: Behavior normal.         Thought Content: Thought content normal.         Judgment: Judgment normal.         ASSESSMENT     Problems Addressed this Visit        Cardiac and Vasculature    Hyperlipidemia    Low HDL (under 40)    Essential hypertension - Primary    Stenosis of right carotid artery      Other Visit Diagnoses     Colon cancer screening        Relevant Orders    Ambulatory Referral For Screening Colonoscopy      Diagnoses       Codes Comments    Essential hypertension    -  Primary ICD-10-CM: I10  ICD-9-CM: 401.9      Low HDL (under 40)     ICD-10-CM: E78.6  ICD-9-CM: 272.5     Hyperlipidemia, unspecified hyperlipidemia type     ICD-10-CM: E78.5  ICD-9-CM: 272.4     Colon cancer screening     ICD-10-CM: Z12.11  ICD-9-CM: V76.51     Stenosis of right carotid artery     ICD-10-CM: I65.21  ICD-9-CM: 433.10           PLAN blood pressure is stable.  LDL cholesterol is very close to goal at 71.  HDL cholesterol remains low.  Fasting blood sugar is normal.  Kidney functions are normal.  ALT is minimally elevated.  Discussed cardiovascular exercise program per AHA guidelines to control blood pressure and increase HDL cholesterol.  We will continue current medicines.  Suggest getting vascular screening again in November or later.  We will set up a screening colonoscopy.  There are no Patient Instructions on file for this visit.    No follow-ups on file.

## 2021-08-30 ENCOUNTER — HOSPITAL ENCOUNTER (OUTPATIENT)
Dept: GENERAL RADIOLOGY | Facility: HOSPITAL | Age: 51
Discharge: HOME | End: 2021-08-30

## 2021-08-30 ENCOUNTER — ANESTHESIA EVENT (OUTPATIENT)
Dept: PAIN MEDICINE | Facility: HOSPITAL | Age: 51
End: 2021-08-30

## 2021-08-30 ENCOUNTER — ANESTHESIA (OUTPATIENT)
Dept: PAIN MEDICINE | Facility: HOSPITAL | Age: 51
End: 2021-08-30

## 2021-08-30 ENCOUNTER — HOSPITAL ENCOUNTER (OUTPATIENT)
Dept: PAIN MEDICINE | Facility: HOSPITAL | Age: 51
Discharge: HOME | End: 2021-08-30

## 2021-08-30 VITALS
WEIGHT: 209 LBS | BODY MASS INDEX: 30.96 KG/M2 | TEMPERATURE: 97.1 F | DIASTOLIC BLOOD PRESSURE: 99 MMHG | RESPIRATION RATE: 16 BRPM | HEIGHT: 69 IN | SYSTOLIC BLOOD PRESSURE: 141 MMHG | OXYGEN SATURATION: 97 % | HEART RATE: 73 BPM

## 2021-08-30 DIAGNOSIS — M43.12 SPONDYLOLISTHESIS, CERVICAL REGION: ICD-10-CM

## 2021-08-30 DIAGNOSIS — R52 PAIN: ICD-10-CM

## 2021-08-30 PROCEDURE — 25010000002 ROPIVACAINE PER 1 MG: Performed by: ANESTHESIOLOGY

## 2021-08-30 PROCEDURE — 77003 FLUOROGUIDE FOR SPINE INJECT: CPT

## 2021-08-30 RX ORDER — CYCLOBENZAPRINE HCL 10 MG
10 TABLET ORAL NIGHTLY PRN
Qty: 30 TABLET | Refills: 0 | Status: SHIPPED | OUTPATIENT
Start: 2021-08-30 | End: 2021-09-29 | Stop reason: SDUPTHER

## 2021-08-30 RX ORDER — FENTANYL CITRATE 50 UG/ML
50 INJECTION, SOLUTION INTRAMUSCULAR; INTRAVENOUS AS NEEDED
Status: DISCONTINUED | OUTPATIENT
Start: 2021-08-30 | End: 2021-08-31 | Stop reason: HOSPADM

## 2021-08-30 RX ORDER — LIDOCAINE HYDROCHLORIDE 10 MG/ML
1 INJECTION, SOLUTION INFILTRATION; PERINEURAL ONCE
Status: DISCONTINUED | OUTPATIENT
Start: 2021-08-30 | End: 2021-08-31 | Stop reason: HOSPADM

## 2021-08-30 RX ORDER — SODIUM CHLORIDE 0.9 % (FLUSH) 0.9 %
3-10 SYRINGE (ML) INJECTION AS NEEDED
Status: DISCONTINUED | OUTPATIENT
Start: 2021-08-30 | End: 2021-08-31 | Stop reason: HOSPADM

## 2021-08-30 RX ORDER — MIDAZOLAM HYDROCHLORIDE 1 MG/ML
1 INJECTION INTRAMUSCULAR; INTRAVENOUS AS NEEDED
Status: DISCONTINUED | OUTPATIENT
Start: 2021-08-30 | End: 2021-08-31 | Stop reason: HOSPADM

## 2021-08-30 RX ORDER — ROPIVACAINE HYDROCHLORIDE 5 MG/ML
10 INJECTION, SOLUTION EPIDURAL; INFILTRATION; PERINEURAL ONCE
Status: COMPLETED | OUTPATIENT
Start: 2021-08-30 | End: 2021-08-30

## 2021-08-30 RX ORDER — SODIUM CHLORIDE 0.9 % (FLUSH) 0.9 %
3 SYRINGE (ML) INJECTION EVERY 12 HOURS SCHEDULED
Status: DISCONTINUED | OUTPATIENT
Start: 2021-08-30 | End: 2021-08-31 | Stop reason: HOSPADM

## 2021-08-30 RX ADMIN — ROPIVACAINE HYDROCHLORIDE 10 ML: 5 INJECTION EPIDURAL; INFILTRATION; PERINEURAL at 12:54

## 2021-08-30 NOTE — ANESTHESIA PROCEDURE NOTES
Bilateral cervical facet medial branch diagnostic blocks to cover the levels of C5-6 and C6-7    Pre-sedation assessment completed: 8/30/2021 12:34 PM    Patient reassessed immediately prior to procedure    Patient location during procedure: Pain Clinic  Start time: 8/30/2021 12:40 PM  Stop Time: 8/30/2021 12:55 PM    Reason for block: procedure for pain  Performed by  Anesthesiologist: Toby Leonard MD  Preanesthetic Checklist  Completed: patient identified, IV checked, site marked, risks and benefits discussed, surgical consent, monitors and equipment checked, pre-op evaluation and timeout performed  Prep:  Patient position: prone  Sterile barriers:cap, gloves, mask and sterile barrier  Prep: ChloraPrep  Patient monitoring: blood pressure monitoring, continuous pulse oximetry and EKG  Procedure:  Sedation:no  Approach:bilateral  Guidance:fluoroscopy  Location:cervical  Interspace: to cover the levels of C5-6 and C6-7.  Needle Type:Quincke  Needle Gauge:25 G  Aspiration:negative  Medications:  Comment:With 0.5% Ropivacaine, 0.5 mls per injection site.     Post Assessment  Injection Assessment: negative  Patient Tolerance:comfortable throughout block  Complications:no  Additional Notes  Post-Op Diagnosis Codes:     * Spondylolisthesis, cervical region (M43.12)     * DDD (degenerative disc disease), cervical (M50.30)    The patient was observed in recovery with no evidence of neurological deficits or other problems. All questions were answered. The patient was discharged with appropriate instructions.

## 2021-08-30 NOTE — H&P
CHIEF COMPLAINT:  Neck pain        HISTORY OF PRESENT ILLNESS:  This patient is complaining of neck pain which radiates at times in both of his arms but also in a band across the back of his neck.  It ranges between a 6 and 8 out of 10 on the pain scale. The pain is significantly decreasing the patient's Activities of Daily Living.       Diagnostic imaging: CT myelogram of the cervical spine from 6/21/2021 shows spondylolisthesis and degenerative disc disease.  The worst levels seem to be C5-6 and C6-7, which was also commented as the worst levels by Dr. Derrick Hernandez.     This patient was referred to our clinic by Dr. Derrick Hernandez for bilateral cervical facet medial branch blocks to cover the levels of C5-6 and C6-7 with the possibility of future radiofrequency ablation if appropriate.     Prior cervical epidural steroid injections were not as helpful as the patient neurosurgeon would have liked.    Prior cervical facet medial branch diagnostic injection afforded greater than 80% relief of pain and significantly increased activities of daily living.  The relief has faded, and the patient returns for another diagnostic block possibly leading to future radiofrequency ablation.     PAST MEDICAL HISTORY:  Current Outpatient Medications on File Prior to Encounter   Medication Sig Dispense Refill   • ALPRAZolam (XANAX) 0.5 MG tablet Take 1 tablet by mouth 2 (Two) Times a Day As Needed for Anxiety. 30 tablet 2   • atorvastatin (LIPITOR) 40 MG tablet TAKE ONE TABLET BY MOUTH DAILY 90 tablet 3   • cyclobenzaprine (FLEXERIL) 10 MG tablet Take 1 tablet by mouth At Night As Needed for Muscle Spasms. 30 tablet 0   • ibuprofen (ADVIL,MOTRIN) 200 MG tablet Take 200 mg by mouth Every 6 (Six) Hours As Needed for Mild Pain .     • ibuprofen (ADVIL,MOTRIN) 800 MG tablet Take 800 mg by mouth Every 6 (Six) Hours As Needed for Mild Pain .     • losartan (COZAAR) 25 MG tablet Take 1 tablet by mouth Daily. 90 tablet 3   •  "[DISCONTINUED] dexamethasone (DECADRON) 4 MG tablet Take 2 tablets by mouth Take As Directed. Take both tablets by mouth 2 hours before myelogram 2 tablet 0     No current facility-administered medications on file prior to encounter.       Past Medical History:   Diagnosis Date   • Hyperlipidemia    • Hypertension    • Injury of back     2 lumbar surgeries, lamiectomies, most recent 5 years ago - good resolution    • Narcolepsy    • Neck pain    • Peripheral neuropathy    • Sleep apnea    • Spondylolisthesis, cervical region 08/10/2021       SOCIAL HISTORY:  Counseled to avoid tobacco     REVIEW OF SYSTEMS:  No pertinent hematologic, infectious, or constitutional symptoms.  Other review of systems non-contributory     PHYSICAL EXAM:  /91 (BP Location: Right leg, Patient Position: Sitting)   Pulse 71   Temp 36.2 °C (97.1 °F) (Infrared)   Resp 16   Ht 175.3 cm (69\")   Wt 94.8 kg (209 lb)   SpO2 98%   BMI 30.86 kg/m²   Constitutional: Well-developed well-nourished no acute distress  General: Alert and oriented  Neuromuscular: Decreased range of motion of his neck and pain with both extension and twisting.  Facet loading is positive bilaterally.  Tenderness palpation over the cervical paraspinal muscles and facets bilaterally.       DIAGNOSIS:  Post-Op Diagnosis Codes:  Post-Op Diagnosis Codes:     * Spondylolisthesis, cervical region [M43.12]     * DDD (degenerative disc disease), cervical [M50.30]     PLAN:  -Bilateral Cervical facet medial branch injection #2 in the series to cover the level(s) of C5-6 and C6-7. These injections will be for diagnostic purposes. If pain control is good but temporary, it was discussed with the patient that they likely will be a candidate for radiofrequency lesioning in the future.     - Risks were discussed with the patient, including but not limited to: failure of relief, worsening pain, seizure, tissue, nerve, or blood vessel damage, bleeding, infection, and abscess " formation. Benefits and alternatives were also discussed. No promises were made. The patient voiced understanding.  -  Physical therapy exercises at home should be considered, as tolerated.  -  It is recommended that the patient use the least amount of opioid medication possible.     -  Heat and ice to the affected area as tolerated for pain control.  It was discussed that heating pads can cause burns.  -  Daily low impact exercise such as walking or water exercise was recommended to maintain overall health and aid in weight control.   -  Patient was counseled to abstain from tobacco products.  -  Follow up as needed for subsequent injections.  -His last cervical epidural steroid injections were about a year ago.  We can certainly consider trying these again if necessary in the future.  For now, I will focus on his cervical facets as requested by the surgeon and patient.    Toby Leonard M.D.  Partner, [facility], [facility]  Board Certified in Pain Medicine by the American Board of Anesthesiology  Board Certified in Anesthesiology by the American Board of Anesthesiology     Much of this encounter note is an electronic transcription/translation of spoken language to printed text. The electronic translation of spoken language may permit erroneous, or at times, nonsensical words or phrases to be inadvertently transcribed. Although I have reviewed the note for such errors, some may still exist.

## 2021-08-30 NOTE — TELEPHONE ENCOUNTER
Rx Refill Note  Requested Prescriptions     Pending Prescriptions Disp Refills   • cyclobenzaprine (FLEXERIL) 10 MG tablet 30 tablet 0     Sig: Take 1 tablet by mouth At Night As Needed for Muscle Spasms.      Last office visit with prescribing clinician: 6/28/2021      Next office visit with prescribing clinician: 10/25/2021            Kelly Elise MA  08/30/21, 08:27 EDT

## 2021-09-23 ENCOUNTER — HOSPITAL ENCOUNTER (OUTPATIENT)
Dept: PAIN MEDICINE | Facility: HOSPITAL | Age: 51
Discharge: HOME | End: 2021-09-23

## 2021-09-23 ENCOUNTER — ANESTHESIA (OUTPATIENT)
Dept: PAIN MEDICINE | Facility: HOSPITAL | Age: 51
End: 2021-09-23

## 2021-09-23 ENCOUNTER — ANESTHESIA EVENT (OUTPATIENT)
Dept: PAIN MEDICINE | Facility: HOSPITAL | Age: 51
End: 2021-09-23

## 2021-09-23 ENCOUNTER — HOSPITAL ENCOUNTER (OUTPATIENT)
Dept: GENERAL RADIOLOGY | Facility: HOSPITAL | Age: 51
Discharge: HOME | End: 2021-09-23

## 2021-09-23 VITALS
HEART RATE: 80 BPM | RESPIRATION RATE: 16 BRPM | TEMPERATURE: 98 F | SYSTOLIC BLOOD PRESSURE: 138 MMHG | DIASTOLIC BLOOD PRESSURE: 97 MMHG | OXYGEN SATURATION: 97 %

## 2021-09-23 DIAGNOSIS — R52 PAIN: ICD-10-CM

## 2021-09-23 NOTE — ANESTHESIA PROCEDURE NOTES
No procedure was performed today.        Patient reassessed immediately prior to procedure    Performed by  Anesthesiologist: Toby Leonard MD          No procedure was performed today.

## 2021-09-23 NOTE — H&P
This patient had bilateral cervical facet medial branch injections x2 to cover C5-6 and C6-7.  The most recent one was performed on 8/30/2021.  He presents today stating that he has had minimal pain since that injection.  I will therefore postpone his radiofrequency ablation until the time that his pain return significantly.  Hopefully for his sake, his relief will be long-lasting.  I am here for him should he need me in the future.      Toby Leonard M.D.  [place], [facility], [facility]  Board Certified in Pain Medicine by the American Board of Anesthesiology  Board Certified in Anesthesiology by the American Board of Anesthesiology

## 2021-09-30 RX ORDER — CYCLOBENZAPRINE HCL 10 MG
10 TABLET ORAL NIGHTLY PRN
Qty: 30 TABLET | Refills: 0 | Status: SHIPPED | OUTPATIENT
Start: 2021-09-30 | End: 2021-10-30 | Stop reason: SDUPTHER

## 2021-09-30 NOTE — TELEPHONE ENCOUNTER
Rx Refill Note  Requested Prescriptions     Pending Prescriptions Disp Refills   • cyclobenzaprine (FLEXERIL) 10 MG tablet 30 tablet 0     Sig: Take 1 tablet by mouth At Night As Needed for Muscle Spasms.      Last office visit with prescribing clinician: 6/28/2021      Next office visit with prescribing clinician: 10/25/2021            Kelly Elise MA  09/30/21, 07:53 EDT

## 2021-10-08 ENCOUNTER — APPOINTMENT (OUTPATIENT)
Dept: PAIN MEDICINE | Facility: HOSPITAL | Age: 51
End: 2021-10-08

## 2021-10-19 DIAGNOSIS — E78.5 HYPERLIPIDEMIA, UNSPECIFIED HYPERLIPIDEMIA TYPE: ICD-10-CM

## 2021-10-19 RX ORDER — ATORVASTATIN CALCIUM 40 MG/1
TABLET, FILM COATED ORAL
Qty: 30 TABLET | Refills: 4 | Status: SHIPPED | OUTPATIENT
Start: 2021-10-19 | End: 2022-01-20 | Stop reason: SDUPTHER

## 2021-10-22 NOTE — PROGRESS NOTES
Subjective   Patient ID: Eugene Shabazz is a 51 y.o. male is here today for follow-up.    He was last seen 6/28/21 for Cervical disc disorder at C5-C6 level with radiculopathy, Cervical disc disorder at C6-C7 level with radiculopathy, and Chronic neck pain.    He was referred to Pain Management..    Today patient reports he has been to Pain Management.  Dr. Leonard does not think he needs the Radiofrequency ablation.    This gentleman has cervical disc disease at C5-C6 and C6-C7 producing neck pain and left arm pain.  Last time he told me the neck was bothering him the most so he went on to get some medial branch blocks which did help but by the time he came back to discuss doing the ablation his pain had settled down and so no ablation was done.  Since then however his symptoms have flared up but is now mainly into the left arm.  Less so the neck.  He has no motor deficits.  He did have epidural block several years ago but not recently.  I told him would be worthwhile to go through that series again see if it helps his arms in an attempt to avoid a two-level ACDF.  He is willing to try that.  We discussed gabapentin again but decided that we would not do it because he is an  at he needs to be able to think clearly.  We will send him to the pain clinic for the epidural blocks and have him come back in 4 months.  If he is not any better we can talk about doing surgery at those 2 levels.        Neck Pain   The pain is present in the right side and midline. The quality of the pain is described as aching. The pain is at a severity of 7/10. Exacerbated by: activity. The pain is same all the time. Associated symptoms include numbness and tingling. Pertinent negatives include no fever, trouble swallowing or visual change. He has tried NSAIDs for the symptoms. The treatment provided mild relief.       The following portions of the patient's history were reviewed and updated as appropriate: allergies, current  "medications, past family history, past medical history, past social history, past surgical history and problem list.    Review of Systems   Constitutional: Negative for chills and fever.   HENT: Negative for congestion and trouble swallowing.    Musculoskeletal: Positive for neck pain.   Neurological: Positive for tingling and numbness.   All other systems reviewed and are negative.          Objective     Vitals:    10/25/21 1249   BP: 142/86   Pulse: 70   Temp: 97.8 °F (36.6 °C)   SpO2: 97%   Weight: 96.5 kg (212 lb 12.8 oz)   Height: 175.3 cm (69\")     Body mass index is 31.43 kg/m².      Physical Exam  Constitutional:       Appearance: He is well-developed.   HENT:      Head: Normocephalic and atraumatic.   Eyes:      Extraocular Movements: EOM normal.      Conjunctiva/sclera: Conjunctivae normal.      Pupils: Pupils are equal, round, and reactive to light.   Neck:      Vascular: No carotid bruit.   Neurological:      Mental Status: He is oriented to person, place, and time.      Coordination: Finger-Nose-Finger Test and Heel to Shin Test normal.      Gait: Gait is intact.      Deep Tendon Reflexes:      Reflex Scores:       Tricep reflexes are 2+ on the right side and 2+ on the left side.       Bicep reflexes are 2+ on the right side and 2+ on the left side.       Brachioradialis reflexes are 2+ on the right side and 2+ on the left side.       Patellar reflexes are 2+ on the right side and 2+ on the left side.       Achilles reflexes are 2+ on the right side and 2+ on the left side.  Psychiatric:         Speech: Speech normal.       Neurologic Exam     Mental Status   Oriented to person, place, and time.   Registration of memory: Good recent and remote memory.   Attention: normal. Concentration: normal.   Speech: speech is normal   Level of consciousness: alert  Knowledge: consistent with education.     Cranial Nerves     CN II   Visual fields full to confrontation.   Visual acuity: normal    CN III, IV, VI "   Pupils are equal, round, and reactive to light.  Extraocular motions are normal.     CN V   Facial sensation intact.   Right corneal reflex: normal  Left corneal reflex: normal    CN VII   Facial expression full, symmetric.   Right facial weakness: none  Left facial weakness: none    CN VIII   Hearing: intact    CN IX, X   Palate: symmetric    CN XI   Right sternocleidomastoid strength: normal  Left sternocleidomastoid strength: normal    CN XII   Tongue: not atrophic  Tongue deviation: none    Motor Exam   Muscle bulk: normal  Right arm tone: normal  Left arm tone: normal  Right leg tone: normal  Left leg tone: normal    Strength   Strength 5/5 except as noted.     Sensory Exam   Light touch normal.     Gait, Coordination, and Reflexes     Gait  Gait: normal    Coordination   Finger to nose coordination: normal  Heel to shin coordination: normal    Reflexes   Right brachioradialis: 2+  Left brachioradialis: 2+  Right biceps: 2+  Left biceps: 2+  Right triceps: 2+  Left triceps: 2+  Right patellar: 2+  Left patellar: 2+  Right achilles: 2+  Left achilles: 2+  Right : 2+  Left : 2+          Assessment/Plan   Independent Review of Radiographic Studies:      I personally reviewed the images from the following studies.    I reviewed his cervical myelogram done on 6/21/2021 which shows osteophytic cervical disc disease at C5-C6 and C6-7 with bilateral foraminal root entrapment particularly at C6-C7.  There are some anterior osteophytes and loss of the cervical lordosis.  Agree with the report.    Medical Decision Making:      Send him back to the pain clinic for a series of 3 cervical epidural blocks.  We will hold off on gabapentin for now.  We will have him return in a few months.  If is not any better we can consider surgery but he might need to be reimaged.      Diagnoses and all orders for this visit:    1. Cervical disc disorder at C5-C6 level with radiculopathy (Primary)  -     Epidural Block    2.  Cervical disc disorder at C6-C7 level with radiculopathy  -     Epidural Block    3. Chronic neck pain  -     Epidural Block      Return in about 4 months (around 2/25/2022) for face to face.

## 2021-10-25 ENCOUNTER — OFFICE VISIT (OUTPATIENT)
Dept: NEUROSURGERY | Facility: CLINIC | Age: 51
End: 2021-10-25

## 2021-10-25 VITALS
WEIGHT: 212.8 LBS | OXYGEN SATURATION: 97 % | SYSTOLIC BLOOD PRESSURE: 142 MMHG | TEMPERATURE: 97.8 F | BODY MASS INDEX: 31.52 KG/M2 | HEIGHT: 69 IN | HEART RATE: 70 BPM | DIASTOLIC BLOOD PRESSURE: 86 MMHG

## 2021-10-25 DIAGNOSIS — M54.2 CHRONIC NECK PAIN: ICD-10-CM

## 2021-10-25 DIAGNOSIS — G89.29 CHRONIC NECK PAIN: ICD-10-CM

## 2021-10-25 DIAGNOSIS — M50.122 CERVICAL DISC DISORDER AT C5-C6 LEVEL WITH RADICULOPATHY: Primary | ICD-10-CM

## 2021-10-25 DIAGNOSIS — M50.123 CERVICAL DISC DISORDER AT C6-C7 LEVEL WITH RADICULOPATHY: ICD-10-CM

## 2021-10-25 PROCEDURE — 99214 OFFICE O/P EST MOD 30 MIN: CPT | Performed by: NEUROLOGICAL SURGERY

## 2021-11-01 RX ORDER — CYCLOBENZAPRINE HCL 10 MG
10 TABLET ORAL NIGHTLY PRN
Qty: 30 TABLET | Refills: 0 | Status: SHIPPED | OUTPATIENT
Start: 2021-11-01 | End: 2021-11-30 | Stop reason: SDUPTHER

## 2021-11-01 NOTE — TELEPHONE ENCOUNTER
Rx Refill Note  Requested Prescriptions     Pending Prescriptions Disp Refills   • cyclobenzaprine (FLEXERIL) 10 MG tablet 30 tablet 0     Sig: Take 1 tablet by mouth At Night As Needed for Muscle Spasms.      Last office visit with prescribing clinician: 10/25/2021      Next office visit with prescribing clinician: 2/28/2022            Kelly Elise MA  11/01/21, 07:59 EDT

## 2021-11-17 ENCOUNTER — ANESTHESIA EVENT (OUTPATIENT)
Dept: PAIN MEDICINE | Facility: HOSPITAL | Age: 51
End: 2021-11-17

## 2021-11-17 ENCOUNTER — HOSPITAL ENCOUNTER (OUTPATIENT)
Dept: GENERAL RADIOLOGY | Facility: HOSPITAL | Age: 51
Discharge: HOME | End: 2021-11-17

## 2021-11-17 ENCOUNTER — ANESTHESIA (OUTPATIENT)
Dept: PAIN MEDICINE | Facility: HOSPITAL | Age: 51
End: 2021-11-17

## 2021-11-17 ENCOUNTER — HOSPITAL ENCOUNTER (OUTPATIENT)
Dept: PAIN MEDICINE | Facility: HOSPITAL | Age: 51
Discharge: HOME | End: 2021-11-17

## 2021-11-17 VITALS
SYSTOLIC BLOOD PRESSURE: 138 MMHG | DIASTOLIC BLOOD PRESSURE: 94 MMHG | OXYGEN SATURATION: 95 % | TEMPERATURE: 97.9 F | RESPIRATION RATE: 16 BRPM | HEART RATE: 81 BPM

## 2021-11-17 DIAGNOSIS — M43.12 SPONDYLOLISTHESIS, CERVICAL REGION: ICD-10-CM

## 2021-11-17 DIAGNOSIS — R52 PAIN: ICD-10-CM

## 2021-11-17 DIAGNOSIS — M50.30 DEGENERATIVE DISC DISEASE, CERVICAL: Primary | ICD-10-CM

## 2021-11-17 PROCEDURE — 77003 FLUOROGUIDE FOR SPINE INJECT: CPT

## 2021-11-17 PROCEDURE — 25010000002 DEXAMETHASONE PER 1 MG: Performed by: ANESTHESIOLOGY

## 2021-11-17 PROCEDURE — 0 IOPAMIDOL 41 % SOLUTION: Performed by: ANESTHESIOLOGY

## 2021-11-17 RX ORDER — MIDAZOLAM HYDROCHLORIDE 1 MG/ML
1 INJECTION INTRAMUSCULAR; INTRAVENOUS AS NEEDED
Status: DISCONTINUED | OUTPATIENT
Start: 2021-11-17 | End: 2021-11-18 | Stop reason: HOSPADM

## 2021-11-17 RX ORDER — DEXAMETHASONE SODIUM PHOSPHATE 10 MG/ML
10 INJECTION, SOLUTION INTRAMUSCULAR; INTRAVENOUS ONCE
Status: COMPLETED | OUTPATIENT
Start: 2021-11-17 | End: 2021-11-17

## 2021-11-17 RX ORDER — LIDOCAINE HYDROCHLORIDE 10 MG/ML
1 INJECTION, SOLUTION INFILTRATION; PERINEURAL ONCE
Status: DISCONTINUED | OUTPATIENT
Start: 2021-11-17 | End: 2021-11-18 | Stop reason: HOSPADM

## 2021-11-17 RX ORDER — SODIUM CHLORIDE 0.9 % (FLUSH) 0.9 %
3-10 SYRINGE (ML) INJECTION AS NEEDED
Status: DISCONTINUED | OUTPATIENT
Start: 2021-11-17 | End: 2021-11-18 | Stop reason: HOSPADM

## 2021-11-17 RX ORDER — LOSARTAN POTASSIUM 25 MG/1
25 TABLET ORAL DAILY
Qty: 90 TABLET | Refills: 3 | Status: SHIPPED | OUTPATIENT
Start: 2021-11-17 | End: 2022-08-14 | Stop reason: SDUPTHER

## 2021-11-17 RX ORDER — FENTANYL CITRATE 50 UG/ML
50 INJECTION, SOLUTION INTRAMUSCULAR; INTRAVENOUS AS NEEDED
Status: DISCONTINUED | OUTPATIENT
Start: 2021-11-17 | End: 2021-11-18 | Stop reason: HOSPADM

## 2021-11-17 RX ORDER — SODIUM CHLORIDE 0.9 % (FLUSH) 0.9 %
3 SYRINGE (ML) INJECTION EVERY 12 HOURS SCHEDULED
Status: DISCONTINUED | OUTPATIENT
Start: 2021-11-17 | End: 2021-11-18 | Stop reason: HOSPADM

## 2021-11-17 RX ADMIN — IOPAMIDOL 10 ML: 408 INJECTION, SOLUTION INTRATHECAL at 08:51

## 2021-11-17 RX ADMIN — DEXAMETHASONE SODIUM PHOSPHATE 10 MG: 10 INJECTION INTRAMUSCULAR; INTRAVENOUS at 08:51

## 2021-11-17 NOTE — H&P
CHIEF COMPLAINT:  Neck pain        HISTORY OF PRESENT ILLNESS:  This patient is complaining of neck pain which radiates primarily down his left upper extremity.  It ranges between a 7 and 9 out of 10 on the pain scale.  The pain is significantly decreasing the patient's Activities of Daily Living.      Diagnostic imaging: CT myelogram of the cervical spine from 6/21/2021 shows spondylolisthesis and degenerative disc disease.  The worst levels seem to be C5-6 and C6-7, which was also commented as the worst levels by Dr. Derrick Hernandez.    This patient was referred to our clinic by Dr. Derrick Hernandez for cervical epidural steroid injections.    He has had prior cervical facet medial branch injections which seemed to help with his neck pain.  Currently, the patient is complaining primarily of radicular pain down his arm.      PAST MEDICAL HISTORY:  Current Outpatient Medications on File Prior to Encounter   Medication Sig Dispense Refill   • ALPRAZolam (XANAX) 0.5 MG tablet Take 1 tablet by mouth 2 (Two) Times a Day As Needed for Anxiety. 30 tablet 2   • atorvastatin (LIPITOR) 40 MG tablet TAKE ONE TABLET BY MOUTH DAILY 30 tablet 4   • cyclobenzaprine (FLEXERIL) 10 MG tablet Take 1 tablet by mouth At Night As Needed for Muscle Spasms. 30 tablet 0   • ibuprofen (ADVIL,MOTRIN) 200 MG tablet Take 200 mg by mouth Every 6 (Six) Hours As Needed for Mild Pain .     • ibuprofen (ADVIL,MOTRIN) 800 MG tablet Take 800 mg by mouth Every 6 (Six) Hours As Needed for Mild Pain .     • losartan (COZAAR) 25 MG tablet Take 1 tablet by mouth Daily. 90 tablet 3     No current facility-administered medications on file prior to encounter.       Past Medical History:   Diagnosis Date   • Hyperlipidemia    • Hypertension    • Injury of back     2 lumbar surgeries, lamiectomies, most recent 5 years ago - good resolution    • Narcolepsy    • Neck pain    • Peripheral neuropathy    • Sleep apnea    • Spondylolisthesis, cervical region  08/10/2021       SOCIAL HISTORY:  Counseled to avoid tobacco     REVIEW OF SYSTEMS:  No pertinent hematologic, infectious, or constitutional symptoms.  Other review of systems non-contributory     PHYSICAL EXAM:  /96 (BP Location: Left arm, Patient Position: Sitting)   Pulse 91   Temp 36.6 °C (97.9 °F) (Infrared)   Resp 16   SpO2 98%   Constitutional: Well-developed well-nourished no acute distress  General: Alert and oriented  Neuromuscular: Spurling's test positive on the left.  He continues have decreased range of motion of his neck and pain with both extension and twisting.  He also has pain with forward flexion.  Facet loading continues to irritate him bilaterally but is not as bad as it used to be.       DIAGNOSIS:  Post-Op Diagnosis Codes:  Post-Op Diagnosis Codes:     * DDD (degenerative disc disease), cervical [M50.30]     * Spondylolisthesis, cervical region [M43.12]     PLAN:  -  Cervical epidural steroid injections with a planned entry level of C6-C7 and attempted medication spread up a few levels. These will likely be spaced approximately 2-4 weeks apart.  If pain control is acceptable after this injection, it was discussed with the patient that they may return for the subsequent injections if and when their pain returns.     - Risks were discussed with the patient, including but not limited to: failure of relief, worsening pain, tissue, nerve, blood vessel or spinal cord damage, post-dural-puncture headache, bleeding, epidural hematoma, infection, and epidural abscess. Benefits and alternatives were also discussed. No promises were made. Risks/benefits/alternatives of procedural medications were also discussed, including but not limited to hyperglycemia, fluid retention, decreased immune system function, osteoporosis, and anaphylactoid/allergic reactions. The patient voiced understanding and agreed to proceed.  -  Physical therapy exercises at home should be considered, as tolerated.  -  It  is recommended that the patient use the least amount of opioid medication possible.     -  Heat and ice to the affected area as tolerated for pain control.  It was discussed that heating pads can cause burns.  -  Daily low impact exercise such as walking or water exercise was recommended to maintain overall health and aid in weight control.   -  Patient was counseled to abstain from tobacco products.  -  Follow up as needed for subsequent injections.  -My understanding is that this gentleman is possibly surgical candidate in the future.  I will be happy to try this epidural steroid injection per the surgeon and patient request.  In the event that his facetogenic pain acts up again, we can certainly still do the radiofrequency ablation sometime.  I will leave that as a backup option.  Hopefully this will be helpful for him.    Toby Leonard M.D.  [place]ristina, Lauren, [facility], [facility]  Board Certified in Pain Medicine by the American Board of Anesthesiology  Board Certified in Anesthesiology by the American Board of Anesthesiology     Much of this encounter note is an electronic transcription/translation of spoken language to printed text. The electronic translation of spoken language may permit erroneous, or at times, nonsensical words or phrases to be inadvertently transcribed. Although I have reviewed the note for such errors, some may still exist.

## 2021-11-17 NOTE — ANESTHESIA PROCEDURE NOTES
Cervical epidural steroid injection    Pre-sedation assessment completed: 11/17/2021 8:44 AM    Patient reassessed immediately prior to procedure    Patient location during procedure: pain clinic  Start Time: 11/17/2021 8:45 AM  Stop Time: 11/17/2021 8:52 AM  Indication:procedure for pain  Performed By  Anesthesiologist: Toby Leonard MD  Preanesthetic Checklist  Completed: patient identified, IV checked, site marked, risks and benefits discussed, surgical consent, monitors and equipment checked, pre-op evaluation and timeout performed  Additional Notes  Post-Op Diagnosis Codes:     * DDD (degenerative disc disease), cervical (M50.30)     * Spondylolisthesis, cervical region (M43.12)    The patient was observed in recovery with no evidence of neurological deficits or other problems. All questions were answered. The patient was discharged with appropriate instructions.  Prep:  Pt Position:prone  Sterile Tech:cap, gloves, mask and sterile barrier  Prep:chlorhexidine gluconate and isopropyl alcohol  Monitoring:blood pressure monitoring, continuous pulse oximetry and EKG  Procedure:Sedation: no     Approach:left paramedian  Guidance: fluoroscopy  Location:cervical  Level:6-7  Needle Type:Tuohy  Needle Gauge:20 G  Aspiration:negative  Medications:  Analgesia: Preservative Free Dexamethasone 10mg.  Preservative Free Saline:2mL  Isovue:1mL  Comments:He had a quick jolt of pain down his left upper extremity just as I lost resistance in the epidural space.  He states that that pain subsided quickly as well.  Appropriate epidural spread of contrast was achieved.   Post Assessment:  Post-procedure: Dressing per nursing.  Pt Tolerance:patient tolerated the procedure well with no apparent complications  Complications:no

## 2021-12-01 RX ORDER — CYCLOBENZAPRINE HCL 10 MG
10 TABLET ORAL NIGHTLY PRN
Qty: 30 TABLET | Refills: 0 | Status: SHIPPED | OUTPATIENT
Start: 2021-12-01 | End: 2021-12-29 | Stop reason: SDUPTHER

## 2021-12-21 ENCOUNTER — HOSPITAL ENCOUNTER (OUTPATIENT)
Dept: GENERAL RADIOLOGY | Facility: HOSPITAL | Age: 51
Discharge: HOME | End: 2021-12-21

## 2021-12-21 ENCOUNTER — HOSPITAL ENCOUNTER (OUTPATIENT)
Dept: PAIN MEDICINE | Facility: HOSPITAL | Age: 51
Discharge: HOME | End: 2021-12-21

## 2021-12-21 ENCOUNTER — ANESTHESIA (OUTPATIENT)
Dept: PAIN MEDICINE | Facility: HOSPITAL | Age: 51
End: 2021-12-21

## 2021-12-21 ENCOUNTER — ANESTHESIA EVENT (OUTPATIENT)
Dept: PAIN MEDICINE | Facility: HOSPITAL | Age: 51
End: 2021-12-21

## 2021-12-21 VITALS
HEART RATE: 91 BPM | DIASTOLIC BLOOD PRESSURE: 97 MMHG | RESPIRATION RATE: 16 BRPM | SYSTOLIC BLOOD PRESSURE: 128 MMHG | OXYGEN SATURATION: 97 % | TEMPERATURE: 97.5 F

## 2021-12-21 DIAGNOSIS — M43.12 SPONDYLOLISTHESIS, CERVICAL REGION: ICD-10-CM

## 2021-12-21 DIAGNOSIS — M50.30 DEGENERATIVE DISC DISEASE, CERVICAL: ICD-10-CM

## 2021-12-21 DIAGNOSIS — R52 PAIN: ICD-10-CM

## 2021-12-21 PROCEDURE — 0 IOPAMIDOL 41 % SOLUTION: Performed by: ANESTHESIOLOGY

## 2021-12-21 PROCEDURE — 77003 FLUOROGUIDE FOR SPINE INJECT: CPT

## 2021-12-21 PROCEDURE — 25010000002 DEXAMETHASONE PER 1 MG: Performed by: ANESTHESIOLOGY

## 2021-12-21 RX ORDER — DEXAMETHASONE SODIUM PHOSPHATE 4 MG/ML
INJECTION, SOLUTION INTRA-ARTICULAR; INTRALESIONAL; INTRAMUSCULAR; INTRAVENOUS; SOFT TISSUE
Status: COMPLETED | OUTPATIENT
Start: 2021-12-21 | End: 2021-12-21

## 2021-12-21 RX ORDER — FENTANYL CITRATE 50 UG/ML
50 INJECTION, SOLUTION INTRAMUSCULAR; INTRAVENOUS AS NEEDED
Status: DISCONTINUED | OUTPATIENT
Start: 2021-12-21 | End: 2021-12-22 | Stop reason: HOSPADM

## 2021-12-21 RX ORDER — MIDAZOLAM HYDROCHLORIDE 1 MG/ML
1 INJECTION INTRAMUSCULAR; INTRAVENOUS AS NEEDED
Status: DISCONTINUED | OUTPATIENT
Start: 2021-12-21 | End: 2021-12-22 | Stop reason: HOSPADM

## 2021-12-21 RX ORDER — LIDOCAINE HYDROCHLORIDE 10 MG/ML
1 INJECTION, SOLUTION INFILTRATION; PERINEURAL ONCE
Status: DISCONTINUED | OUTPATIENT
Start: 2021-12-21 | End: 2021-12-22 | Stop reason: HOSPADM

## 2021-12-21 RX ORDER — DEXAMETHASONE SODIUM PHOSPHATE 10 MG/ML
10 INJECTION, SOLUTION INTRAMUSCULAR; INTRAVENOUS ONCE
Status: COMPLETED | OUTPATIENT
Start: 2021-12-21 | End: 2021-12-21

## 2021-12-21 RX ORDER — SODIUM CHLORIDE 0.9 % (FLUSH) 0.9 %
3-10 SYRINGE (ML) INJECTION AS NEEDED
Status: DISCONTINUED | OUTPATIENT
Start: 2021-12-21 | End: 2021-12-22 | Stop reason: HOSPADM

## 2021-12-21 RX ORDER — SODIUM CHLORIDE 0.9 % (FLUSH) 0.9 %
3 SYRINGE (ML) INJECTION EVERY 12 HOURS SCHEDULED
Status: DISCONTINUED | OUTPATIENT
Start: 2021-12-21 | End: 2021-12-22 | Stop reason: HOSPADM

## 2021-12-21 RX ADMIN — DEXAMETHASONE SODIUM PHOSPHATE 10 MG: 10 INJECTION, SOLUTION INTRAMUSCULAR; INTRAVENOUS at 11:08

## 2021-12-21 RX ADMIN — DEXAMETHASONE SODIUM PHOSPHATE 10 MG: 4 INJECTION INTRA-ARTICULAR; INTRALESIONAL; INTRAMUSCULAR; INTRAVENOUS; SOFT TISSUE at 10:59

## 2021-12-21 RX ADMIN — IOPAMIDOL 10 ML: 408 INJECTION, SOLUTION INTRATHECAL at 11:08

## 2021-12-21 NOTE — ANESTHESIA PROCEDURE NOTES
Cervical epidural steroid injection    Pre-sedation assessment completed: 12/21/2021 10:59 AM    Patient reassessed immediately prior to procedure    Patient location during procedure: pain clinic  Start Time: 12/21/2021 11:02 AM  Stop Time: 12/21/2021 11:09 AM  Indication:procedure for pain  Performed By  Anesthesiologist: Toby Leonard MD  Preanesthetic Checklist  Completed: patient identified, IV checked, site marked, risks and benefits discussed, surgical consent, monitors and equipment checked, pre-op evaluation and timeout performed  Additional Notes  Post-Op Diagnosis Codes:     * DDD (degenerative disc disease), cervical (M50.30)     * Spondylolisthesis, cervical region (M43.12)    The patient was observed in recovery with no evidence of neurological deficits or other problems. All questions were answered. The patient was discharged with appropriate instructions.  Prep:  Pt Position:prone  Sterile Tech:cap, gloves, mask and sterile barrier  Prep:chlorhexidine gluconate and isopropyl alcohol  Monitoring:blood pressure monitoring, continuous pulse oximetry and EKG  Procedure:Sedation: no     Approach:left paramedian  Guidance: fluoroscopy  Location:cervical  Level:6-7  Needle Type:Tuohy  Needle Gauge:20 G  Aspiration:negative  Medications:  Preservative Free Saline:2mL  Isovue:1mL  Comments:Appropriate epidural spread of contrast.   Post Assessment:  Post-procedure: Dressing per nursing.  Pt Tolerance:patient tolerated the procedure well with no apparent complications  Complications:no

## 2021-12-21 NOTE — H&P
CHIEF COMPLAINT:  Neck pain        HISTORY OF PRESENT ILLNESS:  This patient is complaining of neck pain which radiates primarily down his left upper extremity.  It ranges between a  four and six out of 10 on the pain scale.  The pain is significantly decreasing the patient's Activities of Daily Living.       Diagnostic imaging: CT myelogram of the cervical spine from 6/21/2021 shows spondylolisthesis and degenerative disc disease.  The worst levels seem to be C5-6 and C6-7, which was also commented as the worst levels by Dr. Derrick Hernandez.     This patient was referred to our clinic by Dr. Derrick Hernandez for cervical epidural steroid injections.     He has had prior cervical facet medial branch injections which seemed to help with his neck pain.  Currently, the patient is complaining primarily of radicular pain down his arm.    Prior epidural steroid injection performed on 11-17-21 afforded 50% relief of pain and significantly increased activities of daily living. He is requesting another injection in hopes of further relief.     PAST MEDICAL HISTORY:  Current Outpatient Medications on File Prior to Encounter   Medication Sig Dispense Refill   • ALPRAZolam (XANAX) 0.5 MG tablet Take 1 tablet by mouth 2 (Two) Times a Day As Needed for Anxiety. 30 tablet 2   • atorvastatin (LIPITOR) 40 MG tablet TAKE ONE TABLET BY MOUTH DAILY 30 tablet 4   • cyclobenzaprine (FLEXERIL) 10 MG tablet Take 1 tablet by mouth At Night As Needed for Muscle Spasms. 30 tablet 0   • ibuprofen (ADVIL,MOTRIN) 200 MG tablet Take 200 mg by mouth Every 6 (Six) Hours As Needed for Mild Pain .     • ibuprofen (ADVIL,MOTRIN) 800 MG tablet Take 800 mg by mouth Every 6 (Six) Hours As Needed for Mild Pain .     • losartan (COZAAR) 25 MG tablet Take 1 tablet by mouth Daily. 90 tablet 3     No current facility-administered medications on file prior to encounter.       Past Medical History:   Diagnosis Date   • Hyperlipidemia    • Hypertension    •  Injury of back     2 lumbar surgeries, lamiectomies, most recent 5 years ago - good resolution    • Narcolepsy    • Neck pain    • Peripheral neuropathy    • Sleep apnea    • Spondylolisthesis, cervical region 08/10/2021       SOCIAL HISTORY:  Counseled to avoid tobacco     REVIEW OF SYSTEMS:  No pertinent hematologic, infectious, or constitutional symptoms.  Other review of systems non-contributory     PHYSICAL EXAM:  /97 (BP Location: Left arm, Patient Position: Lying)   Pulse 84   Temp 36.4 °C (97.5 °F) (Temporal)   Resp 16   SpO2 97%   Constitutional: Well-developed well-nourished no acute distress  General: Alert and oriented  Neuromuscular: Spurling's test is positive on the left and seems to be better. He has a decreased range of motion of his neck continues to have pain with extension and twisting. He also has pain with forward flexion and seems to have his worst pain when he is working with his arms held over his head. Facet loading pain is milder than it used to be.       DIAGNOSIS:  Post-Op Diagnosis Codes:  Post-Op Diagnosis Codes:     * DDD (degenerative disc disease), cervical [M50.30]     * Spondylolisthesis, cervical region [M43.12]     PLAN:  -  Cervical epidural steroid injection #two in the series with a planned entry level of C6-C7 and attempted medication spread up a few levels. If pain control is acceptable after this injection, it was discussed with the patient that they may return for the subsequent injections if and when their pain returns.     - Risks were discussed with the patient, including but not limited to: failure of relief, worsening pain, tissue, nerve, blood vessel or spinal cord damage, post-dural-puncture headache, bleeding, epidural hematoma, infection, and epidural abscess. Benefits and alternatives were also discussed. No promises were made. Risks/benefits/alternatives of procedural medications were also discussed, including but not limited to hyperglycemia, fluid  retention, decreased immune system function, osteoporosis, and anaphylactoid/allergic reactions. The patient voiced understanding and agreed to proceed.  -  Physical therapy exercises at home should be considered, as tolerated.  -  It is recommended that the patient use the least amount of opioid medication possible.     -  Heat and ice to the affected area as tolerated for pain control.  It was discussed that heating pads can cause burns.  -  Daily low impact exercise such as walking or water exercise was recommended to maintain overall health and aid in weight control.   -  Patient was counseled to abstain from tobacco products.  -  Follow up as needed for subsequent injections.  -My understanding is that this gentleman is possibly surgical candidate in the future.  I will be happy to try this epidural steroid injection per the surgeon and patient request.  In the event that his facetogenic pain acts up again, we can certainly still do the radiofrequency ablation sometime.  I will leave that as a backup option.  Hopefully this will be helpful for him.    Toby Leonard M.D.  Partner, Lauren, [facility], [facility]  Board Certified in Pain Medicine by the American Board of Anesthesiology  Board Certified in Anesthesiology by the American Board of Anesthesiology     Much of this encounter note is an electronic transcription/translation of spoken language to printed text. The electronic translation of spoken language may permit erroneous, or at times, nonsensical words or phrases to be inadvertently transcribed. Although I have reviewed the note for such errors, some may still exist.

## 2021-12-30 RX ORDER — CYCLOBENZAPRINE HCL 10 MG
10 TABLET ORAL NIGHTLY PRN
Qty: 30 TABLET | Refills: 0 | Status: SHIPPED | OUTPATIENT
Start: 2021-12-30

## 2021-12-30 NOTE — TELEPHONE ENCOUNTER
Rx Refill Note  Requested Prescriptions     Pending Prescriptions Disp Refills   • cyclobenzaprine (FLEXERIL) 10 MG tablet 30 tablet 0     Sig: Take 1 tablet by mouth At Night As Needed for Muscle Spasms.      Last office visit with prescribing clinician: 10/25/2021      Next office visit with prescribing clinician: 2/28/2022            Olivia Vicente MA  12/30/21, 07:43 EST

## 2022-01-20 DIAGNOSIS — E78.5 HYPERLIPIDEMIA, UNSPECIFIED HYPERLIPIDEMIA TYPE: ICD-10-CM

## 2022-01-20 NOTE — TELEPHONE ENCOUNTER
Caller: Eugene Shabazz    Relationship: Self    Best call back number: [phone]    Requested Prescriptions:   Requested Prescriptions     Pending Prescriptions Disp Refills   • atorvastatin (LIPITOR) 40 MG tablet 30 tablet 4     Sig: Take 1 tablet by mouth Daily.       CHANGE IN PHARMACY  Pharmacy where request should be sent: [facility] [address] [place] [address] [address] [place] [address] - [phone]  - [phone]     Additional details provided by patient: PATIENT'S INSURANCE REQUESTING 90 DAY SUPPLY    Does the patient have less than a 3 day supply:  [x] Yes  [] No    Zion Gu Rep   01/20/22 12:35 EST

## 2022-01-21 RX ORDER — ATORVASTATIN CALCIUM 40 MG/1
40 TABLET, FILM COATED ORAL DAILY
Qty: 30 TABLET | Refills: 4 | Status: SHIPPED | OUTPATIENT
Start: 2022-01-21 | End: 2022-07-18 | Stop reason: SDUPTHER

## 2022-02-18 NOTE — PROGRESS NOTES
Subjective   Patient ID: Eugene Shabazz is a 52 y.o. male is here today for follow-up of cervical disc disorder.  Pt last seen on 10/25/21 and reported less neck pain and more arm pain along with N/T.  Pt was referred for epidurals again.     Today Mr. Shabazz reports his pain is about the same. He reports that the injection only provided him relief for about a week. He states his pain and numbness and tingling is worse with increased activity. He reports shooting pain down his left arm. He states that his left shoulder muscles feel tight and cramp up.     This patient has known osteophytic cervical disc disease at C5-C6 and C6-C7 causing neck and left arm pain.  Epidural blocks were tried but the benefit was transient.  This is been going on for 5 or 6 years.  He is willing to try gabapentin at 300 mg twice daily.  He was concerned initially about the last time because of his work as an  but he does not feel that he is quite ready to proceed with reimaging and surgery so we will try it at that dose potentially raising it depending upon his feedback.  We will see him in 4 months.  If we feel that we run out of options then reimaging and surgical considerations would be the next step but again were not at that stage.        Neck Pain   This is a chronic problem. The current episode started more than 1 month ago. The problem occurs constantly. The problem has been gradually worsening. The pain is associated with nothing. The pain is present in the midline and left side. The quality of the pain is described as shooting, stabbing and cramping. The pain is at a severity of 5/10. The pain is moderate. The symptoms are aggravated by position, twisting and bending. Associated symptoms include numbness and tingling. Pertinent negatives include no fever or weakness. He has tried NSAIDs and muscle relaxants (injections) for the symptoms. The treatment provided mild relief.       The following portions of the  patient's history were reviewed and updated as appropriate: allergies, current medications, past family history, past medical history, past social history, past surgical history and problem list.    Review of Systems   Constitutional: Negative for activity change and fever.   Musculoskeletal: Positive for neck pain.   Neurological: Positive for tingling and numbness. Negative for weakness.   Psychiatric/Behavioral: Positive for sleep disturbance.   All other systems reviewed and are negative.      Objective   Physical Exam  Constitutional:       Appearance: He is well-developed.   HENT:      Head: Normocephalic and atraumatic.   Eyes:      Extraocular Movements: EOM normal.      Conjunctiva/sclera: Conjunctivae normal.      Pupils: Pupils are equal, round, and reactive to light.   Neck:      Vascular: No carotid bruit.   Neurological:      Mental Status: He is oriented to person, place, and time.      Coordination: Finger-Nose-Finger Test and Heel to Shin Test normal.      Gait: Gait is intact.      Deep Tendon Reflexes:      Reflex Scores:       Tricep reflexes are 2+ on the right side and 2+ on the left side.       Bicep reflexes are 2+ on the right side and 2+ on the left side.       Brachioradialis reflexes are 2+ on the right side and 2+ on the left side.       Patellar reflexes are 2+ on the right side and 2+ on the left side.       Achilles reflexes are 2+ on the right side and 2+ on the left side.  Psychiatric:         Speech: Speech normal.       Neurologic Exam     Mental Status   Oriented to person, place, and time.   Registration of memory: Good recent and remote memory.   Attention: normal. Concentration: normal.   Speech: speech is normal   Level of consciousness: alert  Knowledge: consistent with education.     Cranial Nerves     CN II   Visual fields full to confrontation.   Visual acuity: normal    CN III, IV, VI   Pupils are equal, round, and reactive to light.  Extraocular motions are normal.      CN V   Facial sensation intact.   Right corneal reflex: normal  Left corneal reflex: normal    CN VII   Facial expression full, symmetric.   Right facial weakness: none  Left facial weakness: none    CN VIII   Hearing: intact    CN IX, X   Palate: symmetric    CN XI   Right sternocleidomastoid strength: normal  Left sternocleidomastoid strength: normal    CN XII   Tongue: not atrophic  Tongue deviation: none    Motor Exam   Muscle bulk: normal  Right arm tone: normal  Left arm tone: normal  Right leg tone: normal  Left leg tone: normal    Strength   Strength 5/5 except as noted.     Sensory Exam   Light touch normal.     Gait, Coordination, and Reflexes     Gait  Gait: normal    Coordination   Finger to nose coordination: normal  Heel to shin coordination: normal    Reflexes   Right brachioradialis: 2+  Left brachioradialis: 2+  Right biceps: 2+  Left biceps: 2+  Right triceps: 2+  Left triceps: 2+  Right patellar: 2+  Left patellar: 2+  Right achilles: 2+  Left achilles: 2+  Right : 2+  Left : 2+      Assessment/Plan   Independent Review of Radiographic Studies:      I reviewed his cervical myelogram done on 6/21/2021 which shows osteophytic cervical disc disease at C5-C6 and C6-7 with bilateral foraminal root entrapment particularly at C6-C7.  There are some anterior osteophytes and loss of the cervical lordosis.  Agree with the report.    Medical Decision Making:      We will try some gabapentin at 300 mg twice daily.  We will see him in 4 months.  I asked him to call us in about 4 to 6 weeks to let us know how he is doing with the medicine.  If it is not helping but not causing side effects, we can raise the dose.      Diagnoses and all orders for this visit:    1. Cervical disc disorder at C5-C6 level with radiculopathy (Primary)    2. Cervical disc disorder at C6-C7 level with radiculopathy    3. Chronic neck pain      Return in about 4 months (around 6/28/2022) for To face.

## 2022-02-25 DIAGNOSIS — R89.9 ABNORMAL LABORATORY TEST RESULT: ICD-10-CM

## 2022-02-25 DIAGNOSIS — Z13.29 SCREENING FOR THYROID DISORDER: ICD-10-CM

## 2022-02-25 DIAGNOSIS — E78.5 HYPERLIPIDEMIA, UNSPECIFIED HYPERLIPIDEMIA TYPE: ICD-10-CM

## 2022-02-28 ENCOUNTER — OFFICE VISIT (OUTPATIENT)
Dept: NEUROSURGERY | Facility: CLINIC | Age: 52
End: 2022-02-28

## 2022-02-28 VITALS
WEIGHT: 210 LBS | HEART RATE: 80 BPM | OXYGEN SATURATION: 97 % | SYSTOLIC BLOOD PRESSURE: 130 MMHG | HEIGHT: 69 IN | DIASTOLIC BLOOD PRESSURE: 70 MMHG | TEMPERATURE: 98.4 F | BODY MASS INDEX: 31.1 KG/M2

## 2022-02-28 DIAGNOSIS — M54.2 CHRONIC NECK PAIN: ICD-10-CM

## 2022-02-28 DIAGNOSIS — M50.122 CERVICAL DISC DISORDER AT C5-C6 LEVEL WITH RADICULOPATHY: Primary | ICD-10-CM

## 2022-02-28 DIAGNOSIS — G89.29 CHRONIC NECK PAIN: ICD-10-CM

## 2022-02-28 DIAGNOSIS — M50.123 CERVICAL DISC DISORDER AT C6-C7 LEVEL WITH RADICULOPATHY: ICD-10-CM

## 2022-02-28 PROCEDURE — 99214 OFFICE O/P EST MOD 30 MIN: CPT | Performed by: NEUROLOGICAL SURGERY

## 2022-02-28 RX ORDER — GABAPENTIN 300 MG/1
CAPSULE ORAL
Qty: 60 CAPSULE | Refills: 3 | Status: SHIPPED | OUTPATIENT
Start: 2022-02-28 | End: 2022-05-02 | Stop reason: SDUPTHER

## 2022-03-02 LAB
ALBUMIN SERPL-MCNC: 4.7 G/DL (ref 3.8–4.9)
ALBUMIN/GLOB SERPL: 2.6 {RATIO} (ref 1.2–2.2)
ALP SERPL-CCNC: 66 IU/L (ref 44–121)
ALT SERPL-CCNC: 34 IU/L (ref 0–44)
AST SERPL-CCNC: 36 IU/L (ref 0–40)
BILIRUB SERPL-MCNC: 0.7 MG/DL (ref 0–1.2)
BUN SERPL-MCNC: 12 MG/DL (ref 6–24)
BUN/CREAT SERPL: 12 (ref 9–20)
CALCIUM SERPL-MCNC: 9.5 MG/DL (ref 8.7–10.2)
CHLORIDE SERPL-SCNC: 103 MMOL/L (ref 96–106)
CHOLEST SERPL-MCNC: 127 MG/DL (ref 100–199)
CO2 SERPL-SCNC: 25 MMOL/L (ref 20–29)
CREAT SERPL-MCNC: 0.99 MG/DL (ref 0.76–1.27)
EGFR GENE MUT ANL BLD/T: 92 ML/MIN/1.73
GLOBULIN SER CALC-MCNC: 1.8 G/DL (ref 1.5–4.5)
GLUCOSE SERPL-MCNC: 99 MG/DL (ref 65–99)
HDLC SERPL-MCNC: 31 MG/DL
LDLC SERPL CALC-MCNC: 68 MG/DL (ref 0–99)
LDLC/HDLC SERPL: 2.2 RATIO (ref 0–3.6)
POTASSIUM SERPL-SCNC: 4.7 MMOL/L (ref 3.5–5.2)
PROT SERPL-MCNC: 6.5 G/DL (ref 6–8.5)
SODIUM SERPL-SCNC: 142 MMOL/L (ref 134–144)
TRIGL SERPL-MCNC: 164 MG/DL (ref 0–149)
TSH SERPL DL<=0.005 MIU/L-ACNC: 1.87 UIU/ML (ref 0.45–4.5)
VLDLC SERPL CALC-MCNC: 28 MG/DL (ref 5–40)

## 2022-03-08 ENCOUNTER — OFFICE VISIT (OUTPATIENT)
Dept: FAMILY MEDICINE CLINIC | Facility: CLINIC | Age: 52
End: 2022-03-08

## 2022-03-08 VITALS
HEIGHT: 69 IN | BODY MASS INDEX: 31.34 KG/M2 | OXYGEN SATURATION: 99 % | SYSTOLIC BLOOD PRESSURE: 128 MMHG | DIASTOLIC BLOOD PRESSURE: 78 MMHG | RESPIRATION RATE: 18 BRPM | TEMPERATURE: 97.1 F | HEART RATE: 103 BPM | WEIGHT: 211.6 LBS

## 2022-03-08 DIAGNOSIS — I65.21 STENOSIS OF RIGHT CAROTID ARTERY: ICD-10-CM

## 2022-03-08 DIAGNOSIS — Z12.5 PROSTATE CANCER SCREENING: ICD-10-CM

## 2022-03-08 DIAGNOSIS — G47.33 OSA (OBSTRUCTIVE SLEEP APNEA): ICD-10-CM

## 2022-03-08 DIAGNOSIS — I10 ESSENTIAL HYPERTENSION: Primary | ICD-10-CM

## 2022-03-08 DIAGNOSIS — E78.6 LOW HDL (UNDER 40): ICD-10-CM

## 2022-03-08 DIAGNOSIS — E78.5 HYPERLIPIDEMIA, UNSPECIFIED HYPERLIPIDEMIA TYPE: ICD-10-CM

## 2022-03-08 PROCEDURE — 99214 OFFICE O/P EST MOD 30 MIN: CPT | Performed by: INTERNAL MEDICINE

## 2022-03-08 NOTE — PROGRESS NOTES
Subjective   Eugene Shabazz is a 52 y.o. male. Patient is here today for   Chief Complaint   Patient presents with   • Hyperlipidemia     Lab fu          Vitals:    03/08/22 0819   BP: 128/78   Pulse: 103   Resp: 18   Temp: 97.1 °F (36.2 °C)   SpO2: 99%     Body mass index is 31.25 kg/m².      The following portions of the patient's history were reviewed and updated as appropriate: allergies, current medications, past family history, past medical history, past social history, past surgical history and problem list.    Past Medical History:   Diagnosis Date   • Hyperlipidemia    • Hypertension    • Injury of back     2 lumbar surgeries, lamiectomies, most recent 5 years ago - good resolution    • Narcolepsy    • Neck pain    • Peripheral neuropathy    • Sleep apnea    • Spondylolisthesis, cervical region 08/10/2021      No Known Allergies   Social History     Socioeconomic History   • Marital status:    Tobacco Use   • Smoking status: Never Smoker   • Smokeless tobacco: Never Used   Substance and Sexual Activity   • Alcohol use: Yes   • Drug use: No   • Sexual activity: Defer        Current Outpatient Medications:   •  ALPRAZolam (XANAX) 0.5 MG tablet, Take 1 tablet by mouth 2 (Two) Times a Day As Needed for Anxiety., Disp: 30 tablet, Rfl: 2  •  atorvastatin (LIPITOR) 40 MG tablet, Take 1 tablet by mouth Daily., Disp: 30 tablet, Rfl: 4  •  cyclobenzaprine (FLEXERIL) 10 MG tablet, Take 1 tablet by mouth At Night As Needed for Muscle Spasms., Disp: 30 tablet, Rfl: 0  •  gabapentin (NEURONTIN) 300 MG capsule, Take 1 capsule p.o. nightly x7 days then twice daily, Disp: 60 capsule, Rfl: 3  •  ibuprofen (ADVIL,MOTRIN) 800 MG tablet, Take 800 mg by mouth Every 6 (Six) Hours As Needed for Mild Pain ., Disp: , Rfl:   •  losartan (COZAAR) 25 MG tablet, Take 1 tablet by mouth Daily., Disp: 90 tablet, Rfl: 3  •  ibuprofen (ADVIL,MOTRIN) 200 MG tablet, Take 200 mg by mouth Every 6 (Six) Hours As Needed for Mild Pain .,  Disp: , Rfl:      Objective   History of Present Illness Eugene is here for a blood pressure check and lab follow-up.  He has hypertension, hyperlipidemia, low HDL, mild carotid artery stenosis, obstructive sleep apnea, and obesity.  He feels well.  He cooks at home and does not eat out.  He does not exercise.  He does not monitor his blood pressure.  His weight is unchanged in last 6 months.  He had vascular screening tests in November 2019.  He is compliant with CPAP.    Review of Systems   Constitutional: Negative for activity change and unexpected weight change.   Respiratory: Negative.    Cardiovascular: Negative.    Gastrointestinal: Negative.    Genitourinary: Negative.    Musculoskeletal: Positive for neck pain.   Neurological: Negative for weakness and numbness.   Psychiatric/Behavioral: Negative.        Physical Exam  Vitals reviewed.   Constitutional:       Appearance: Normal appearance.   Neck:      Vascular: No carotid bruit.   Cardiovascular:      Rate and Rhythm: Normal rate and regular rhythm.      Heart sounds: Normal heart sounds.      Comments: 125/88  Pulmonary:      Effort: Pulmonary effort is normal.      Breath sounds: Normal breath sounds.   Musculoskeletal:      Right lower leg: No edema.      Left lower leg: No edema.   Neurological:      Mental Status: He is alert.   Psychiatric:         Mood and Affect: Mood normal.         Behavior: Behavior normal.         Thought Content: Thought content normal.         Judgment: Judgment normal.         ASSESSMENT blood pressure readings today are elevated.  You need to monitor blood pressure correctly at home while resting relaxed as instructed.  Goal is less than 120/80.  Will increase losartan dose if indicated.  Total and LDL cholesterol are normal.  HDL cholesterol is low and triglycerides are mildly elevated.  Fasting glucose, kidney and liver functions, and thyroid-stimulating hormone level are normal.  Discussed healthy heart diet and  importance of regular cardiovascular exercise program per AHA guidelines.  Discussed appropriate immunizations.  We will continue current medicines.  You need vascular screening test again.     Problems Addressed this Visit        Cardiac and Vasculature    Hyperlipidemia    Low HDL (under 40)    Essential hypertension - Primary    Stenosis of right carotid artery       Sleep    GERMAN (obstructive sleep apnea)      Diagnoses       Codes Comments    Essential hypertension    -  Primary ICD-10-CM: I10  ICD-9-CM: 401.9     Hyperlipidemia, unspecified hyperlipidemia type     ICD-10-CM: E78.5  ICD-9-CM: 272.4     Low HDL (under 40)     ICD-10-CM: E78.6  ICD-9-CM: 272.5     Stenosis of right carotid artery     ICD-10-CM: I65.21  ICD-9-CM: 433.10     GERMAN (obstructive sleep apnea)     ICD-10-CM: G47.33  ICD-9-CM: 327.23           PLAN  There are no Patient Instructions on file for this visit.    No follow-ups on file.

## 2022-05-02 DIAGNOSIS — G89.29 CHRONIC NECK PAIN: ICD-10-CM

## 2022-05-02 DIAGNOSIS — M50.123 CERVICAL DISC DISORDER AT C6-C7 LEVEL WITH RADICULOPATHY: ICD-10-CM

## 2022-05-02 DIAGNOSIS — M54.2 CHRONIC NECK PAIN: ICD-10-CM

## 2022-05-02 DIAGNOSIS — M50.122 CERVICAL DISC DISORDER AT C5-C6 LEVEL WITH RADICULOPATHY: ICD-10-CM

## 2022-05-03 RX ORDER — GABAPENTIN 300 MG/1
CAPSULE ORAL
Qty: 60 CAPSULE | Refills: 3 | Status: SHIPPED | OUTPATIENT
Start: 2022-05-03 | End: 2022-06-30 | Stop reason: SDUPTHER

## 2022-05-03 NOTE — TELEPHONE ENCOUNTER
Rx Refill Note  Requested Prescriptions     Pending Prescriptions Disp Refills   • gabapentin (NEURONTIN) 300 MG capsule 60 capsule 3     Sig: Take 1 capsule p.o. nightly x7 days then twice daily      Last office visit with prescribing clinician: 2/28/2022      Next office visit with prescribing clinician: 7/6/2022            Olivia Vicente MA  05/03/22, 07:31 EDT

## 2022-05-10 ENCOUNTER — TRANSCRIBE ORDERS (OUTPATIENT)
Dept: ADMINISTRATIVE | Facility: HOSPITAL | Age: 52
End: 2022-05-10

## 2022-05-10 DIAGNOSIS — Z13.6 ENCOUNTER FOR SCREENING FOR VASCULAR DISEASE: Primary | ICD-10-CM

## 2022-06-30 DIAGNOSIS — M54.2 CHRONIC NECK PAIN: ICD-10-CM

## 2022-06-30 DIAGNOSIS — M50.123 CERVICAL DISC DISORDER AT C6-C7 LEVEL WITH RADICULOPATHY: ICD-10-CM

## 2022-06-30 DIAGNOSIS — M50.122 CERVICAL DISC DISORDER AT C5-C6 LEVEL WITH RADICULOPATHY: ICD-10-CM

## 2022-06-30 DIAGNOSIS — G89.29 CHRONIC NECK PAIN: ICD-10-CM

## 2022-07-01 RX ORDER — GABAPENTIN 300 MG/1
CAPSULE ORAL
Qty: 60 CAPSULE | Refills: 1 | Status: SHIPPED | OUTPATIENT
Start: 2022-07-01 | End: 2022-08-29 | Stop reason: SDUPTHER

## 2022-07-18 DIAGNOSIS — E78.5 HYPERLIPIDEMIA, UNSPECIFIED HYPERLIPIDEMIA TYPE: ICD-10-CM

## 2022-07-19 RX ORDER — ATORVASTATIN CALCIUM 40 MG/1
40 TABLET, FILM COATED ORAL DAILY
Qty: 30 TABLET | Refills: 4 | Status: SHIPPED | OUTPATIENT
Start: 2022-07-19 | End: 2022-09-29 | Stop reason: SDUPTHER

## 2022-08-17 RX ORDER — LOSARTAN POTASSIUM 25 MG/1
25 TABLET ORAL DAILY
Qty: 90 TABLET | Refills: 3 | Status: SHIPPED | OUTPATIENT
Start: 2022-08-17 | End: 2022-09-23

## 2022-08-29 ENCOUNTER — TELEPHONE (OUTPATIENT)
Dept: NEUROSURGERY | Facility: CLINIC | Age: 52
End: 2022-08-29

## 2022-08-29 DIAGNOSIS — M50.122 CERVICAL DISC DISORDER AT C5-C6 LEVEL WITH RADICULOPATHY: ICD-10-CM

## 2022-08-29 DIAGNOSIS — M50.123 CERVICAL DISC DISORDER AT C6-C7 LEVEL WITH RADICULOPATHY: ICD-10-CM

## 2022-08-29 DIAGNOSIS — G89.29 CHRONIC NECK PAIN: ICD-10-CM

## 2022-08-29 DIAGNOSIS — M54.2 CHRONIC NECK PAIN: ICD-10-CM

## 2022-08-29 RX ORDER — GABAPENTIN 300 MG/1
CAPSULE ORAL
Qty: 60 CAPSULE | Refills: 3 | Status: SHIPPED | OUTPATIENT
Start: 2022-08-29 | End: 2022-10-18 | Stop reason: SDUPTHER

## 2022-08-30 ENCOUNTER — TELEPHONE (OUTPATIENT)
Dept: NEUROSURGERY | Facility: CLINIC | Age: 52
End: 2022-08-30

## 2022-08-30 DIAGNOSIS — M54.12 CERVICAL RADICULOPATHY: Primary | ICD-10-CM

## 2022-08-30 DIAGNOSIS — M50.123 CERVICAL DISC DISORDER AT C6-C7 LEVEL WITH RADICULOPATHY: ICD-10-CM

## 2022-08-30 DIAGNOSIS — M50.122 CERVICAL DISC DISORDER AT C5-C6 LEVEL WITH RADICULOPATHY: ICD-10-CM

## 2022-08-30 NOTE — TELEPHONE ENCOUNTER
----- Message from Derrick Hernandez MD sent at 8/29/2022  5:39 PM EDT -----  I refilled his gabapentin.  He has been on it for a while.  He needs some kind follow-up visit.  It can be a televisit with me.  Tell him we need to do this in order to continue prescribing the medicine.  Also get a CBC.  That is part of the recommended follow-up for patient's on this medicine.

## 2022-08-30 NOTE — TELEPHONE ENCOUNTER
LVM FOR THE PATIENT ADVISING OF LAB ORDER AND REFILL. ALSO WILL NEED TO SET UP A FOLLOW-UP APPOINTMENT.

## 2022-08-31 DIAGNOSIS — M50.123 CERVICAL DISC DISORDER AT C6-C7 LEVEL WITH RADICULOPATHY: ICD-10-CM

## 2022-08-31 DIAGNOSIS — M50.122 CERVICAL DISC DISORDER AT C5-C6 LEVEL WITH RADICULOPATHY: ICD-10-CM

## 2022-08-31 DIAGNOSIS — G89.29 CHRONIC NECK PAIN: ICD-10-CM

## 2022-08-31 DIAGNOSIS — M54.2 CHRONIC NECK PAIN: ICD-10-CM

## 2022-08-31 RX ORDER — GABAPENTIN 300 MG/1
CAPSULE ORAL
Qty: 60 CAPSULE | Refills: 3 | Status: CANCELLED | OUTPATIENT
Start: 2022-08-31

## 2022-09-07 ENCOUNTER — LAB (OUTPATIENT)
Dept: LAB | Facility: HOSPITAL | Age: 52
End: 2022-09-07

## 2022-09-07 DIAGNOSIS — M54.12 CERVICAL RADICULOPATHY: ICD-10-CM

## 2022-09-07 DIAGNOSIS — M50.122 CERVICAL DISC DISORDER AT C5-C6 LEVEL WITH RADICULOPATHY: ICD-10-CM

## 2022-09-07 DIAGNOSIS — M50.123 CERVICAL DISC DISORDER AT C6-C7 LEVEL WITH RADICULOPATHY: ICD-10-CM

## 2022-09-07 LAB
BASOPHILS # BLD AUTO: 0.03 10*3/MM3 (ref 0–0.2)
BASOPHILS NFR BLD AUTO: 0.5 % (ref 0–1.5)
DEPRECATED RDW RBC AUTO: 41.4 FL (ref 37–54)
EOSINOPHIL # BLD AUTO: 0.08 10*3/MM3 (ref 0–0.4)
EOSINOPHIL NFR BLD AUTO: 1.4 % (ref 0.3–6.2)
ERYTHROCYTE [DISTWIDTH] IN BLOOD BY AUTOMATED COUNT: 12.7 % (ref 12.3–15.4)
HCT VFR BLD AUTO: 45.7 % (ref 37.5–51)
HGB BLD-MCNC: 16.2 G/DL (ref 13–17.7)
IMM GRANULOCYTES # BLD AUTO: 0.04 10*3/MM3 (ref 0–0.05)
IMM GRANULOCYTES NFR BLD AUTO: 0.7 % (ref 0–0.5)
LYMPHOCYTES # BLD AUTO: 1.52 10*3/MM3 (ref 0.7–3.1)
LYMPHOCYTES NFR BLD AUTO: 27.5 % (ref 19.6–45.3)
MCH RBC QN AUTO: 31.3 PG (ref 26.6–33)
MCHC RBC AUTO-ENTMCNC: 35.4 G/DL (ref 31.5–35.7)
MCV RBC AUTO: 88.2 FL (ref 79–97)
MONOCYTES # BLD AUTO: 0.5 10*3/MM3 (ref 0.1–0.9)
MONOCYTES NFR BLD AUTO: 9.1 % (ref 5–12)
NEUTROPHILS NFR BLD AUTO: 3.35 10*3/MM3 (ref 1.7–7)
NEUTROPHILS NFR BLD AUTO: 60.8 % (ref 42.7–76)
NRBC BLD AUTO-RTO: 0 /100 WBC (ref 0–0.2)
PLATELET # BLD AUTO: 161 10*3/MM3 (ref 140–450)
PMV BLD AUTO: 9.5 FL (ref 6–12)
RBC # BLD AUTO: 5.18 10*6/MM3 (ref 4.14–5.8)
WBC NRBC COR # BLD: 5.52 10*3/MM3 (ref 3.4–10.8)

## 2022-09-07 PROCEDURE — 36415 COLL VENOUS BLD VENIPUNCTURE: CPT

## 2022-09-07 PROCEDURE — 85025 COMPLETE CBC W/AUTO DIFF WBC: CPT

## 2022-09-08 ENCOUNTER — OFFICE VISIT (OUTPATIENT)
Dept: NEUROSURGERY | Facility: CLINIC | Age: 52
End: 2022-09-08

## 2022-09-08 DIAGNOSIS — G89.29 CHRONIC NECK PAIN: Primary | ICD-10-CM

## 2022-09-08 DIAGNOSIS — M50.122 CERVICAL DISC DISORDER AT C5-C6 LEVEL WITH RADICULOPATHY: ICD-10-CM

## 2022-09-08 DIAGNOSIS — M54.2 CHRONIC NECK PAIN: Primary | ICD-10-CM

## 2022-09-08 DIAGNOSIS — M50.123 CERVICAL DISC DISORDER AT C6-C7 LEVEL WITH RADICULOPATHY: ICD-10-CM

## 2022-09-08 PROCEDURE — 99442 PR PHYS/QHP TELEPHONE EVALUATION 11-20 MIN: CPT | Performed by: NEUROLOGICAL SURGERY

## 2022-09-08 NOTE — PROGRESS NOTES
Subjective   Patient ID: Eugene Shabazz is a 52 y.o. male is here today for follow-up for neck pain. He was last seen on 2/28/22 with less neck pain and more arm pain with numbness and tingling. He is on Gabapentin.     You have chosen to receive care through a telephone visit. Do you consent to use a telephone visit for your medical care today? Yes    Mr. Shabazz reports neck pain that radiates to bilateral arms/hands as well as numbness in the hands.  His sleep is affected as well.    Unable to complete visit using a video connection to the patient. A phone visit was used to complete this visits. Total time of discussion was 11 minutes.    I was calling from the office.  He was at home.  He has cervical disc disease at C5-C6 and C6-C7 with neck pain and left arm pain.  The gabapentin controls his symptoms.  He just takes 300 mg nightly.  He is an  and did not want to use it during the daytime.  But he is doing well and we will continue to prescribe the medicines.  I asked to see him for a face-to-face visit in 9 months.  He will let us know if there is a flareup in between.      History of Present Illness    The following portions of the patient's history were reviewed and updated as appropriate: allergies, current medications, past family history, past medical history, past social history, past surgical history and problem list.    Review of Systems   Gastrointestinal:        No b/b incontinence   Musculoskeletal: Positive for neck pain (radiates to bilateral arms/hands).   Neurological: Positive for numbness (hands). Negative for weakness.   Psychiatric/Behavioral: Positive for sleep disturbance.           Objective     There were no vitals filed for this visit.  There is no height or weight on file to calculate BMI.      Physical Exam  Neurologic Exam        Assessment & Plan   Independent Review of Radiographic Studies:      I personally reviewed the images from the following studies.       I  reviewed his cervical myelogram done on 6/21/2021 which shows osteophytic cervical disc disease at C5-C6 and C6-7 with bilateral foraminal root entrapment particularly at C6-C7.  There are some anterior osteophytes and loss of the cervical lordosis.  Agree with the report.       Medical Decision Making:      He will continue his gabapentin which he just takes once a day at 300 mg.  We will refill it.  I asked him to come back to see me face-to-face in 9 months.  If there is a flareup between now and then, he will let us know.      Diagnoses and all orders for this visit:    1. Chronic neck pain (Primary)    2. Cervical disc disorder at C6-C7 level with radiculopathy    3. Cervical disc disorder at C5-C6 level with radiculopathy      Return in about 9 months (around 6/8/2023) for face to face.

## 2022-09-23 ENCOUNTER — OFFICE VISIT (OUTPATIENT)
Dept: FAMILY MEDICINE CLINIC | Facility: CLINIC | Age: 52
End: 2022-09-23

## 2022-09-23 VITALS
SYSTOLIC BLOOD PRESSURE: 140 MMHG | DIASTOLIC BLOOD PRESSURE: 80 MMHG | TEMPERATURE: 97.5 F | WEIGHT: 213.6 LBS | HEART RATE: 87 BPM | OXYGEN SATURATION: 68 % | BODY MASS INDEX: 31.64 KG/M2 | RESPIRATION RATE: 18 BRPM | HEIGHT: 69 IN

## 2022-09-23 DIAGNOSIS — I65.21 STENOSIS OF RIGHT CAROTID ARTERY: ICD-10-CM

## 2022-09-23 DIAGNOSIS — F41.9 ANXIETY: ICD-10-CM

## 2022-09-23 DIAGNOSIS — I10 ESSENTIAL HYPERTENSION: Primary | ICD-10-CM

## 2022-09-23 DIAGNOSIS — E78.5 HYPERLIPIDEMIA, UNSPECIFIED HYPERLIPIDEMIA TYPE: ICD-10-CM

## 2022-09-23 DIAGNOSIS — E78.6 LOW HDL (UNDER 40): ICD-10-CM

## 2022-09-23 PROCEDURE — 99214 OFFICE O/P EST MOD 30 MIN: CPT | Performed by: INTERNAL MEDICINE

## 2022-09-23 RX ORDER — ESCITALOPRAM OXALATE 10 MG/1
10 TABLET ORAL DAILY
Qty: 30 TABLET | Refills: 5 | Status: SHIPPED | OUTPATIENT
Start: 2022-09-23 | End: 2022-11-29 | Stop reason: SDUPTHER

## 2022-09-23 RX ORDER — LOSARTAN POTASSIUM 50 MG/1
50 TABLET ORAL DAILY
Qty: 90 TABLET | Refills: 3 | Status: SHIPPED | OUTPATIENT
Start: 2022-09-23

## 2022-09-23 NOTE — PROGRESS NOTES
Subjective   Eugene Shabazz is a 52 y.o. male. Patient is here today for   Chief Complaint   Patient presents with   • Hypertension     HYPERLIPIDEMIA- F/U LABS          Vitals:    09/23/22 0806   BP: 140/80   Pulse: 87   Resp: 18   Temp: 97.5 °F (36.4 °C)   SpO2: (!) 68%     Body mass index is 31.53 kg/m².      The following portions of the patient's history were reviewed and updated as appropriate: allergies, current medications, past family history, past medical history, past social history, past surgical history and problem list.    Past Medical History:   Diagnosis Date   • Hyperlipidemia    • Hypertension    • Injury of back     2 lumbar surgeries, lamiectomies, most recent 5 years ago - good resolution    • Narcolepsy    • Neck pain    • Peripheral neuropathy    • Sleep apnea    • Spondylolisthesis, cervical region 08/10/2021      No Known Allergies   Social History     Socioeconomic History   • Marital status:    Tobacco Use   • Smoking status: Never Smoker   • Smokeless tobacco: Never Used   Vaping Use   • Vaping Use: Never used   Substance and Sexual Activity   • Alcohol use: Yes   • Drug use: No   • Sexual activity: Defer        Current Outpatient Medications:   •  ALPRAZolam (XANAX) 0.5 MG tablet, Take 1 tablet by mouth 2 (Two) Times a Day As Needed for Anxiety., Disp: 30 tablet, Rfl: 2  •  atorvastatin (LIPITOR) 40 MG tablet, Take 1 tablet by mouth Daily., Disp: 30 tablet, Rfl: 4  •  cyclobenzaprine (FLEXERIL) 10 MG tablet, Take 1 tablet by mouth At Night As Needed for Muscle Spasms., Disp: 30 tablet, Rfl: 0  •  gabapentin (NEURONTIN) 300 MG capsule, Take 1 capsule twice daily, Disp: 60 capsule, Rfl: 3  •  ibuprofen (ADVIL,MOTRIN) 200 MG tablet, Take 200 mg by mouth Every 6 (Six) Hours As Needed for Mild Pain ., Disp: , Rfl:   •  escitalopram (Lexapro) 10 MG tablet, Take 1 tablet by mouth Daily., Disp: 30 tablet, Rfl: 5  •  losartan (Cozaar) 50 MG tablet, Take 1 tablet by mouth Daily., Disp:  90 tablet, Rfl: 3     Objective   History of Present Illness Eugene is here for a blood pressure check and lab follow-up.  He has hypertension, hyperlipidemia, low HDL, right carotid artery stenosis.  He does not eat as healthy as he should and does not exercise.  He does not monitor his blood pressure.  He states that he is under a lot of stress at work.  He had vascular screening test in 2019 and has repeat scheduled next month.  He has not scheduled a screening colonoscopy but does have the information and plans to.    Review of Systems   Constitutional: Negative.    Respiratory: Negative.    Cardiovascular: Negative.    Gastrointestinal: Negative.    Genitourinary: Negative.    Neurological: Negative.    Psychiatric/Behavioral:        As in HPI       Physical Exam  Vitals reviewed.   Constitutional:       Appearance: Normal appearance.   Neck:      Vascular: No carotid bruit.   Cardiovascular:      Rate and Rhythm: Normal rate and regular rhythm.      Heart sounds: Normal heart sounds.      Comments: 140/88,140/86  Pulmonary:      Effort: Pulmonary effort is normal.      Breath sounds: Normal breath sounds.   Musculoskeletal:      Right lower leg: No edema.      Left lower leg: No edema.   Neurological:      Mental Status: He is alert.   Psychiatric:         Mood and Affect: Mood normal.         Behavior: Behavior normal.         Thought Content: Thought content normal.         Judgment: Judgment normal.         ASSESSMENT blood pressure readings are high.  Will increase losartan to 50 mg daily.  You need to monitor your blood pressure correctly at home while resting relaxed as instructed.  Goal is less than 120/80.  HDL cholesterol remains low.  Triglycerides are moderately elevated.  Kidney functions are normal.  Prostate-specific antigen is normal.  Discussed healthy heart diet and exercise per AHA guidelines.  Discussed stress at length and importance of exercise to reduce stress.  Will start Lexapro 10 mg  daily and follow-up in 1 month.     Problems Addressed this Visit        Cardiac and Vasculature    Hyperlipidemia    Low HDL (under 40)    Essential hypertension - Primary    Relevant Medications    losartan (Cozaar) 50 MG tablet    Stenosis of right carotid artery       Mental Health    Anxiety      Diagnoses       Codes Comments    Essential hypertension    -  Primary ICD-10-CM: I10  ICD-9-CM: 401.9     Hyperlipidemia, unspecified hyperlipidemia type     ICD-10-CM: E78.5  ICD-9-CM: 272.4     Low HDL (under 40)     ICD-10-CM: E78.6  ICD-9-CM: 272.5     Stenosis of right carotid artery     ICD-10-CM: I65.21  ICD-9-CM: 433.10     Anxiety     ICD-10-CM: F41.9  ICD-9-CM: 300.00           PLAN  There are no Patient Instructions on file for this visit.    No follow-ups on file.

## 2022-09-29 DIAGNOSIS — E78.5 HYPERLIPIDEMIA, UNSPECIFIED HYPERLIPIDEMIA TYPE: ICD-10-CM

## 2022-09-29 RX ORDER — ATORVASTATIN CALCIUM 40 MG/1
40 TABLET, FILM COATED ORAL DAILY
Qty: 90 TABLET | Refills: 1 | Status: SHIPPED | OUTPATIENT
Start: 2022-09-29

## 2022-09-29 NOTE — TELEPHONE ENCOUNTER
Rx Refill Note  Requested Prescriptions      No prescriptions requested or ordered in this encounter      Last office visit with prescribing clinician: 9/23/2022      Next office visit with prescribing clinician: 11/7/2022            Amy Olvera MA  09/29/22, 13:23 EDT

## 2022-10-03 ENCOUNTER — HOSPITAL ENCOUNTER (OUTPATIENT)
Dept: CARDIOLOGY | Facility: HOSPITAL | Age: 52
Discharge: HOME | End: 2022-10-03
Admitting: INTERNAL MEDICINE

## 2022-10-03 VITALS
SYSTOLIC BLOOD PRESSURE: 129 MMHG | WEIGHT: 212 LBS | HEART RATE: 64 BPM | DIASTOLIC BLOOD PRESSURE: 79 MMHG | BODY MASS INDEX: 31.4 KG/M2 | HEIGHT: 69 IN

## 2022-10-03 DIAGNOSIS — Z13.6 ENCOUNTER FOR SCREENING FOR VASCULAR DISEASE: ICD-10-CM

## 2022-10-03 LAB
BH CV ECHO MEAS - DIST AO DIAM: 1.48 CM
BH CV VAS BP LEFT ARM: NORMAL MMHG
BH CV VAS BP RIGHT ARM: NORMAL MMHG
BH CV XLRA MEAS - MID AO DIAM: 1.78 CM
BH CV XLRA MEAS - PAD LEFT ABI DP: 1.16
BH CV XLRA MEAS - PAD LEFT ABI PT: 1.2
BH CV XLRA MEAS - PAD LEFT ARM: 129 MMHG
BH CV XLRA MEAS - PAD LEFT LEG DP: 150 MMHG
BH CV XLRA MEAS - PAD LEFT LEG PT: 155 MMHG
BH CV XLRA MEAS - PAD RIGHT ABI DP: 1.22
BH CV XLRA MEAS - PAD RIGHT ABI PT: 1.22
BH CV XLRA MEAS - PAD RIGHT ARM: 125 MMHG
BH CV XLRA MEAS - PAD RIGHT LEG DP: 158 MMHG
BH CV XLRA MEAS - PAD RIGHT LEG PT: 158 MMHG
BH CV XLRA MEAS - PROX AO DIAM: 2.14 CM
BH CV XLRA MEAS LEFT ICA/CCA RATIO: 1.4
BH CV XLRA MEAS LEFT MID CCA PSV: NORMAL CM/SEC
BH CV XLRA MEAS LEFT MID ICA PSV: NORMAL CM/SEC
BH CV XLRA MEAS LEFT PROX ECA PSV: NORMAL CM/SEC
BH CV XLRA MEAS RIGHT ICA/CCA RATIO: 0.95
BH CV XLRA MEAS RIGHT MID CCA PSV: NORMAL CM/SEC
BH CV XLRA MEAS RIGHT MID ICA PSV: NORMAL CM/SEC
BH CV XLRA MEAS RIGHT PROX ECA PSV: NORMAL CM/SEC
MAXIMAL PREDICTED HEART RATE: 169 BPM
STRESS TARGET HR: 144 BPM

## 2022-10-03 PROCEDURE — 93799 UNLISTED CV SVC/PROCEDURE: CPT

## 2022-10-18 DIAGNOSIS — G89.29 CHRONIC NECK PAIN: ICD-10-CM

## 2022-10-18 DIAGNOSIS — M50.122 CERVICAL DISC DISORDER AT C5-C6 LEVEL WITH RADICULOPATHY: ICD-10-CM

## 2022-10-18 DIAGNOSIS — M54.2 CHRONIC NECK PAIN: ICD-10-CM

## 2022-10-18 DIAGNOSIS — M50.123 CERVICAL DISC DISORDER AT C6-C7 LEVEL WITH RADICULOPATHY: ICD-10-CM

## 2022-10-18 RX ORDER — GABAPENTIN 300 MG/1
CAPSULE ORAL
Qty: 60 CAPSULE | Refills: 5 | Status: SHIPPED | OUTPATIENT
Start: 2022-10-18

## 2022-10-18 NOTE — TELEPHONE ENCOUNTER
Rx Refill Note  Requested Prescriptions     Pending Prescriptions Disp Refills   • gabapentin (NEURONTIN) 300 MG capsule 60 capsule 3     Sig: Take 1 capsule twice daily      Last office visit with prescribing clinician: 9/8/2022      Next office visit with prescribing clinician: 6/12/2023            Olivia Vicente MA  10/18/22, 10:17 EDT

## 2022-11-29 ENCOUNTER — OFFICE VISIT (OUTPATIENT)
Dept: FAMILY MEDICINE CLINIC | Facility: CLINIC | Age: 52
End: 2022-11-29
Payer: COMMERCIAL

## 2022-11-29 VITALS
DIASTOLIC BLOOD PRESSURE: 70 MMHG | OXYGEN SATURATION: 95 % | SYSTOLIC BLOOD PRESSURE: 114 MMHG | TEMPERATURE: 98 F | BODY MASS INDEX: 31.4 KG/M2 | HEIGHT: 69 IN | WEIGHT: 212 LBS | RESPIRATION RATE: 16 BRPM | HEART RATE: 83 BPM

## 2022-11-29 DIAGNOSIS — I10 ESSENTIAL HYPERTENSION: Primary | ICD-10-CM

## 2022-11-29 PROBLEM — I65.21 STENOSIS OF RIGHT CAROTID ARTERY: Status: RESOLVED | Noted: 2020-12-16 | Resolved: 2022-11-29

## 2022-11-29 PROCEDURE — 99213 OFFICE O/P EST LOW 20 MIN: CPT | Performed by: INTERNAL MEDICINE

## 2022-11-29 RX ORDER — ESCITALOPRAM OXALATE 10 MG/1
10 TABLET ORAL DAILY
Qty: 90 TABLET | Refills: 3 | Status: SHIPPED | OUTPATIENT
Start: 2022-11-29

## 2022-11-29 NOTE — PROGRESS NOTES
Subjective   Eugene Shabazz is a 53 y.o. male. Patient is here today for   Chief Complaint   Patient presents with   • Follow-up   • Hypertension          Vitals:    11/29/22 1558   BP: 114/70   Pulse: 83   Resp: 16   Temp: 98 °F (36.7 °C)   SpO2: 95%     Body mass index is 31.76 kg/m².    The following portions of the patient's history were reviewed and updated as appropriate: allergies, current medications, past family history, past medical history, past social history, past surgical history and problem list.    Past Medical History:   Diagnosis Date   • Hyperlipidemia    • Hypertension    • Injury of back     2 lumbar surgeries, lamiectomies, most recent 5 years ago - good resolution    • Narcolepsy    • Neck pain    • Peripheral neuropathy    • Sleep apnea    • Spondylolisthesis, cervical region 08/10/2021      No Known Allergies   Social History     Socioeconomic History   • Marital status:    Tobacco Use   • Smoking status: Never   • Smokeless tobacco: Never   Vaping Use   • Vaping Use: Never used   Substance and Sexual Activity   • Alcohol use: Yes   • Drug use: No   • Sexual activity: Defer        Current Outpatient Medications:   •  ALPRAZolam (XANAX) 0.5 MG tablet, Take 1 tablet by mouth 2 (Two) Times a Day As Needed for Anxiety., Disp: 30 tablet, Rfl: 2  •  atorvastatin (LIPITOR) 40 MG tablet, Take 1 tablet by mouth Daily., Disp: 90 tablet, Rfl: 1  •  cyclobenzaprine (FLEXERIL) 10 MG tablet, Take 1 tablet by mouth At Night As Needed for Muscle Spasms., Disp: 30 tablet, Rfl: 0  •  escitalopram (Lexapro) 10 MG tablet, Take 1 tablet by mouth Daily., Disp: 90 tablet, Rfl: 3  •  gabapentin (NEURONTIN) 300 MG capsule, Take 1 capsule twice daily, Disp: 60 capsule, Rfl: 5  •  ibuprofen (ADVIL,MOTRIN) 200 MG tablet, Take 200 mg by mouth Every 6 (Six) Hours As Needed for Mild Pain ., Disp: , Rfl:   •  losartan (Cozaar) 50 MG tablet, Take 1 tablet by mouth Daily., Disp: 90 tablet, Rfl: 3     Objective      History of Present Illness     Review of Systems   Constitutional: Negative.    Respiratory: Negative.    Cardiovascular: Negative.    Psychiatric/Behavioral: Negative for agitation and dysphoric mood.       Physical Exam  Vitals reviewed.   Constitutional:       Appearance: Normal appearance.   Cardiovascular:      Rate and Rhythm: Normal rate and regular rhythm.      Heart sounds: Normal heart sounds.      Comments: 120/78  Pulmonary:      Effort: Pulmonary effort is normal.      Breath sounds: Normal breath sounds.   Musculoskeletal:      Right lower leg: No edema.      Left lower leg: No edema.   Neurological:      Mental Status: He is alert.   Psychiatric:         Mood and Affect: Mood normal.         Behavior: Behavior normal.         Thought Content: Thought content normal.         Judgment: Judgment normal.         ASSESSMENT     Problems Addressed this Visit        Cardiac and Vasculature    Essential hypertension - Primary   Diagnoses       Codes Comments    Essential hypertension    -  Primary ICD-10-CM: I10  ICD-9-CM: 401.9           PLAN  There are no Patient Instructions on file for this visit.  No follow-ups on file.

## 2023-03-23 DIAGNOSIS — E78.5 HYPERLIPIDEMIA, UNSPECIFIED HYPERLIPIDEMIA TYPE: ICD-10-CM

## 2023-03-23 NOTE — TELEPHONE ENCOUNTER
Rx Refill Note  Requested Prescriptions     Pending Prescriptions Disp Refills   • atorvastatin (LIPITOR) 40 MG tablet [Pharmacy Med Name: ATORVASTATIN TABS 40MG] 90 tablet 3     Sig: TAKE 1 TABLET DAILY      Last office visit with prescribing clinician: 11/29/2022   Last telemedicine visit with prescribing clinician: Visit date not found   Next office visit with prescribing clinician: Visit date not found                         Would you like a call back once the refill request has been completed: [] Yes [] No    If the office needs to give you a call back, can they leave a voicemail: [] Yes [] No    Erica Singleton MA  03/23/23, 14:58 EDT

## 2023-04-12 NOTE — TELEPHONE ENCOUNTER
Caller: [facility] - [place] [phone] [facility] [phone] FX    Relationship:     Best call back number: [phone]    Requested Prescriptions:   Requested Prescriptions     Pending Prescriptions Disp Refills   • atorvastatin (LIPITOR) 40 MG tablet [Pharmacy Med Name: ATORVASTATIN TABS 40MG] 90 tablet 3     Sig: TAKE 1 TABLET DAILY        Pharmacy where request should be sent: [facility] - [place] [phone] [place] [address] [phone] FX     Last office visit with prescribing clinician: 11/29/2022   Last telemedicine visit with prescribing clinician: Visit date not found   Next office visit with prescribing clinician: Visit date not found     Additional details provided by patient: WILL NEED NEW PRESCRIPTION.  THIS WAS SENT ON 3/23/23 AND PATIENT NEEDS MEDICATION.      Does the patient have less than a 3 day supply:  [x] Yes  [] No    Would you like a call back once the refill request has been completed: [] Yes [x] No    If the office needs to give you a call back, can they leave a voicemail: [] Yes [x] No    Zion Harden Rep   04/12/23 09:50 EDT

## 2023-04-13 RX ORDER — ATORVASTATIN CALCIUM 40 MG/1
TABLET, FILM COATED ORAL
Qty: 90 TABLET | Refills: 3 | Status: SHIPPED | OUTPATIENT
Start: 2023-04-13

## 2023-05-25 ENCOUNTER — TELEPHONE (OUTPATIENT)
Dept: FAMILY MEDICINE CLINIC | Facility: CLINIC | Age: 53
End: 2023-05-25

## 2023-05-25 NOTE — TELEPHONE ENCOUNTER
"  Caller: Eugene Shabazz \"JT\"    Relationship: Self    Best call back number: [phone]    What orders are you requesting (i.e. lab or imaging): LABS    In what timeframe would the patient need to come in:ASAP    Where will you receive your lab/imaging services: OFFICE    Additional notes:CALL TO SET UP AFTER ORDERS ARE IN  "

## 2023-06-02 DIAGNOSIS — E78.6 LOW HDL (UNDER 40): ICD-10-CM

## 2023-06-02 DIAGNOSIS — I10 ESSENTIAL HYPERTENSION: ICD-10-CM

## 2023-06-02 DIAGNOSIS — E78.5 HYPERLIPIDEMIA, UNSPECIFIED HYPERLIPIDEMIA TYPE: Primary | ICD-10-CM

## 2023-06-03 LAB
ALBUMIN SERPL-MCNC: 4.9 G/DL (ref 3.5–5.2)
ALBUMIN/GLOB SERPL: 2.9 G/DL
ALP SERPL-CCNC: 62 U/L (ref 39–117)
ALT SERPL-CCNC: 32 U/L (ref 1–41)
APPEARANCE UR: CLEAR
AST SERPL-CCNC: 26 U/L (ref 1–40)
BASOPHILS # BLD AUTO: 0.02 10*3/MM3 (ref 0–0.2)
BASOPHILS NFR BLD AUTO: 0.4 % (ref 0–1.5)
BILIRUB SERPL-MCNC: 0.9 MG/DL (ref 0–1.2)
BILIRUB UR QL STRIP: NEGATIVE
BUN SERPL-MCNC: 18 MG/DL (ref 6–20)
BUN/CREAT SERPL: 18.4 (ref 7–25)
CALCIUM SERPL-MCNC: 9.7 MG/DL (ref 8.6–10.5)
CHLORIDE SERPL-SCNC: 105 MMOL/L (ref 98–107)
CHOLEST SERPL-MCNC: 136 MG/DL (ref 0–200)
CO2 SERPL-SCNC: 27.3 MMOL/L (ref 22–29)
COLOR UR: YELLOW
CREAT SERPL-MCNC: 0.98 MG/DL (ref 0.76–1.27)
EGFRCR SERPLBLD CKD-EPI 2021: 92.8 ML/MIN/1.73
EOSINOPHIL # BLD AUTO: 0.06 10*3/MM3 (ref 0–0.4)
EOSINOPHIL NFR BLD AUTO: 1.3 % (ref 0.3–6.2)
ERYTHROCYTE [DISTWIDTH] IN BLOOD BY AUTOMATED COUNT: 12.5 % (ref 12.3–15.4)
GLOBULIN SER CALC-MCNC: 1.7 GM/DL
GLUCOSE SERPL-MCNC: 104 MG/DL (ref 65–99)
GLUCOSE UR QL STRIP: NEGATIVE
HCT VFR BLD AUTO: 47.6 % (ref 37.5–51)
HDLC SERPL-MCNC: 32 MG/DL (ref 40–60)
HGB BLD-MCNC: 16.6 G/DL (ref 13–17.7)
HGB UR QL STRIP: NEGATIVE
IMM GRANULOCYTES # BLD AUTO: 0.02 10*3/MM3 (ref 0–0.05)
IMM GRANULOCYTES NFR BLD AUTO: 0.4 % (ref 0–0.5)
KETONES UR QL STRIP: NEGATIVE
LDLC SERPL CALC-MCNC: 78 MG/DL (ref 0–100)
LDLC/HDLC SERPL: 2.31 {RATIO}
LEUKOCYTE ESTERASE UR QL STRIP: NEGATIVE
LYMPHOCYTES # BLD AUTO: 1.56 10*3/MM3 (ref 0.7–3.1)
LYMPHOCYTES NFR BLD AUTO: 32.9 % (ref 19.6–45.3)
MCH RBC QN AUTO: 32.1 PG (ref 26.6–33)
MCHC RBC AUTO-ENTMCNC: 34.9 G/DL (ref 31.5–35.7)
MCV RBC AUTO: 92.1 FL (ref 79–97)
MONOCYTES # BLD AUTO: 0.44 10*3/MM3 (ref 0.1–0.9)
MONOCYTES NFR BLD AUTO: 9.3 % (ref 5–12)
NEUTROPHILS # BLD AUTO: 2.64 10*3/MM3 (ref 1.7–7)
NEUTROPHILS NFR BLD AUTO: 55.7 % (ref 42.7–76)
NITRITE UR QL STRIP: NEGATIVE
NRBC BLD AUTO-RTO: 0 /100 WBC (ref 0–0.2)
PH UR STRIP: 6.5 [PH] (ref 5–8)
PLATELET # BLD AUTO: 184 10*3/MM3 (ref 140–450)
POTASSIUM SERPL-SCNC: 4.9 MMOL/L (ref 3.5–5.2)
PROT SERPL-MCNC: 6.6 G/DL (ref 6–8.5)
PROT UR QL STRIP: NEGATIVE
RBC # BLD AUTO: 5.17 10*6/MM3 (ref 4.14–5.8)
SODIUM SERPL-SCNC: 144 MMOL/L (ref 136–145)
SP GR UR STRIP: 1.02 (ref 1–1.03)
TRIGL SERPL-MCNC: 150 MG/DL (ref 0–150)
UROBILINOGEN UR STRIP-MCNC: NORMAL MG/DL
VLDLC SERPL CALC-MCNC: 26 MG/DL (ref 5–40)
WBC # BLD AUTO: 4.74 10*3/MM3 (ref 3.4–10.8)

## 2023-06-12 ENCOUNTER — OFFICE VISIT (OUTPATIENT)
Dept: NEUROSURGERY | Facility: CLINIC | Age: 53
End: 2023-06-12
Payer: COMMERCIAL

## 2023-06-12 ENCOUNTER — OFFICE VISIT (OUTPATIENT)
Dept: FAMILY MEDICINE CLINIC | Facility: CLINIC | Age: 53
End: 2023-06-12
Payer: COMMERCIAL

## 2023-06-12 VITALS
SYSTOLIC BLOOD PRESSURE: 112 MMHG | WEIGHT: 213 LBS | BODY MASS INDEX: 31.55 KG/M2 | TEMPERATURE: 97.3 F | HEIGHT: 69 IN | RESPIRATION RATE: 16 BRPM | HEART RATE: 84 BPM | DIASTOLIC BLOOD PRESSURE: 74 MMHG

## 2023-06-12 VITALS
SYSTOLIC BLOOD PRESSURE: 122 MMHG | RESPIRATION RATE: 18 BRPM | DIASTOLIC BLOOD PRESSURE: 70 MMHG | WEIGHT: 212 LBS | BODY MASS INDEX: 31.4 KG/M2 | HEIGHT: 69 IN

## 2023-06-12 DIAGNOSIS — M54.2 CHRONIC NECK PAIN: Primary | ICD-10-CM

## 2023-06-12 DIAGNOSIS — E78.6 LOW HDL (UNDER 40): ICD-10-CM

## 2023-06-12 DIAGNOSIS — R73.01 ELEVATED FASTING GLUCOSE: ICD-10-CM

## 2023-06-12 DIAGNOSIS — E78.5 HYPERLIPIDEMIA, UNSPECIFIED HYPERLIPIDEMIA TYPE: ICD-10-CM

## 2023-06-12 DIAGNOSIS — I10 ESSENTIAL HYPERTENSION: Primary | ICD-10-CM

## 2023-06-12 DIAGNOSIS — F41.9 ANXIETY: ICD-10-CM

## 2023-06-12 DIAGNOSIS — M50.122 CERVICAL DISC DISORDER AT C5-C6 LEVEL WITH RADICULOPATHY: ICD-10-CM

## 2023-06-12 DIAGNOSIS — G89.29 CHRONIC NECK PAIN: Primary | ICD-10-CM

## 2023-06-12 DIAGNOSIS — M50.123 CERVICAL DISC DISORDER AT C6-C7 LEVEL WITH RADICULOPATHY: ICD-10-CM

## 2023-06-12 PROCEDURE — 99214 OFFICE O/P EST MOD 30 MIN: CPT | Performed by: INTERNAL MEDICINE

## 2023-06-12 PROCEDURE — 99214 OFFICE O/P EST MOD 30 MIN: CPT | Performed by: NEUROLOGICAL SURGERY

## 2023-06-12 RX ORDER — GABAPENTIN 400 MG/1
400 CAPSULE ORAL 3 TIMES DAILY
Qty: 60 CAPSULE | Refills: 5 | Status: SHIPPED | OUTPATIENT
Start: 2023-06-12

## 2023-06-12 NOTE — PROGRESS NOTES
"Subjective   Patient ID: Eugene Shabazz is a 53 y.o. male is here today for follow-up.    This very nice gentleman is with his wife.  I been following him for 2 years.  He is an  with osteophytic cervical disc disease at C5-C6 and C6-C7 causing chronic neck pain and bilateral arm numbness and tingling.  He has no motor deficits but he does say his neck pain has increased and the numbness and tingling has increased.  He can still function but it is bothering him more.  He is only taking the gabapentin at 300 mg at night.  I told him that a small dose and in order to control some of the arm symptoms we might want to increase the dose and make it twice a day.  So we agreed on 400 mg twice daily.  He had epidural blocks in the past early on and they helped but just briefly.  I did mention to him the possibility of doing a cervical ablation for his neck pain and I brought it up again.  He was not quite sure that he was ready so I told him to think about it and will just try the increased dose of gabapentin and he will let me know between now and the next visit in 9 months if he wants to consider the ablation.  I urged him to continue doing the exercises including the chin tuck which she had been neglecting.  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        History of Present Illness    The following portions of the patient's history were reviewed and updated as appropriate: allergies, current medications, past family history, past medical history, past social history, past surgical history and problem list.    Review of Systems   Constitutional: Negative for fever.   Musculoskeletal: Positive for neck pain.   Neurological: Negative for weakness and numbness.   All other systems reviewed and are negative.          Objective     Vitals:    06/12/23 0849   BP: 122/70   BP Location: Right arm   Patient Position: Sitting   Cuff Size: Adult   Resp: 18   Weight: 96.2 kg (212 lb)   Height: 174 cm (68.5\")   PainSc:   2 "   PainLoc: Neck     Body mass index is 31.76 kg/m².    Tobacco Use: Low Risk    • Smoking Tobacco Use: Never   • Smokeless Tobacco Use: Never   • Passive Exposure: Not on file          Physical Exam  Constitutional:       Appearance: He is well-developed.   HENT:      Head: Normocephalic and atraumatic.   Eyes:      Extraocular Movements: EOM normal.      Conjunctiva/sclera: Conjunctivae normal.      Pupils: Pupils are equal, round, and reactive to light.   Neck:      Vascular: No carotid bruit.   Neurological:      Mental Status: He is oriented to person, place, and time.      Coordination: Finger-Nose-Finger Test and Heel to Shin Test normal.      Gait: Gait is intact.      Deep Tendon Reflexes:      Reflex Scores:       Tricep reflexes are 2+ on the right side and 2+ on the left side.       Bicep reflexes are 2+ on the right side and 2+ on the left side.       Brachioradialis reflexes are 2+ on the right side and 2+ on the left side.       Patellar reflexes are 2+ on the right side and 2+ on the left side.       Achilles reflexes are 2+ on the right side and 2+ on the left side.  Psychiatric:         Speech: Speech normal.       Neurologic Exam     Mental Status   Oriented to person, place, and time.   Registration of memory: Good recent and remote memory.   Attention: normal. Concentration: normal.   Speech: speech is normal   Level of consciousness: alert  Knowledge: consistent with education.     Cranial Nerves     CN II   Visual fields full to confrontation.   Visual acuity: normal    CN III, IV, VI   Pupils are equal, round, and reactive to light.  Extraocular motions are normal.     CN V   Facial sensation intact.   Right corneal reflex: normal  Left corneal reflex: normal    CN VII   Facial expression full, symmetric.   Right facial weakness: none  Left facial weakness: none    CN VIII   Hearing: intact    CN IX, X   Palate: symmetric    CN XI   Right sternocleidomastoid strength: normal  Left  sternocleidomastoid strength: normal    CN XII   Tongue: not atrophic  Tongue deviation: none    Motor Exam   Muscle bulk: normal  Right arm tone: normal  Left arm tone: normal  Right leg tone: normal  Left leg tone: normal    Strength   Strength 5/5 except as noted.     Sensory Exam   Light touch normal.     Gait, Coordination, and Reflexes     Gait  Gait: normal    Coordination   Finger to nose coordination: normal  Heel to shin coordination: normal    Reflexes   Right brachioradialis: 2+  Left brachioradialis: 2+  Right biceps: 2+  Left biceps: 2+  Right triceps: 2+  Left triceps: 2+  Right patellar: 2+  Left patellar: 2+  Right achilles: 2+  Left achilles: 2+  Right : 2+  Left : 2+          Assessment & Plan   Independent Review of Radiographic Studies:      I personally reviewed the images from the following studies.    I reviewed his cervical myelogram done on 6/21/2021 which shows osteophytic cervical disc disease at C5-C6 and C6-7 with bilateral foraminal root entrapment particularly at C6-C7.  There are some anterior osteophytes and loss of the cervical lordosis.  Agree with the report.    Medical Decision Making:      We will increase his gabapentin to 400 mg twice daily.  We will see him in 9 months.  If he wants to move forward with a cervical ablation, he will let us know and we can send him to [place] for that.  He will focus on doing his home exercises as well.            Diagnoses and all orders for this visit:    1. Chronic neck pain (Primary)  -     gabapentin (NEURONTIN) 400 MG capsule; Take 1 capsule by mouth 3 (Three) Times a Day.  Dispense: 60 capsule; Refill: 5    2. Cervical disc disorder at C5-C6 level with radiculopathy  -     gabapentin (NEURONTIN) 400 MG capsule; Take 1 capsule by mouth 3 (Three) Times a Day.  Dispense: 60 capsule; Refill: 5    3. Cervical disc disorder at C6-C7 level with radiculopathy  -     gabapentin (NEURONTIN) 400 MG capsule; Take 1 capsule by mouth 3  (Three) Times a Day.  Dispense: 60 capsule; Refill: 5      Return in about 9 months (around 3/12/2024) for face to face.

## 2023-06-12 NOTE — PROGRESS NOTES
Subjective   Eugene Shabazz is a 53 y.o. male. Patient is here today for   Chief Complaint   Patient presents with    Follow-up    Hypertension    Hyperlipidemia          Vitals:    06/12/23 1615   BP: 112/74   Pulse: 84   Resp: 16   Temp: 97.3 °F (36.3 °C)     Body mass index is 31.91 kg/m².      The following portions of the patient's history were reviewed and updated as appropriate: allergies, current medications, past family history, past medical history, past social history, past surgical history and problem list.    Past Medical History:   Diagnosis Date    Hyperlipidemia     Hypertension     Injury of back     2 lumbar surgeries, lamiectomies, most recent 5 years ago - good resolution     Narcolepsy     Neck pain     Peripheral neuropathy     Sleep apnea     Spondylolisthesis, cervical region 08/10/2021      No Known Allergies   Social History     Socioeconomic History    Marital status:    Tobacco Use    Smoking status: Never    Smokeless tobacco: Never   Vaping Use    Vaping Use: Never used   Substance and Sexual Activity    Alcohol use: Yes    Drug use: No    Sexual activity: Defer        Current Outpatient Medications:     ALPRAZolam (XANAX) 0.5 MG tablet, Take 1 tablet by mouth 2 (Two) Times a Day As Needed for Anxiety., Disp: 30 tablet, Rfl: 2    atorvastatin (LIPITOR) 40 MG tablet, TAKE 1 TABLET DAILY, Disp: 90 tablet, Rfl: 3    escitalopram (Lexapro) 10 MG tablet, Take 1 tablet by mouth Daily., Disp: 90 tablet, Rfl: 3    gabapentin (NEURONTIN) 400 MG capsule, Take 1 capsule by mouth 3 (Three) Times a Day., Disp: 60 capsule, Rfl: 5    ibuprofen (ADVIL,MOTRIN) 200 MG tablet, Take 1 tablet by mouth Every 6 (Six) Hours As Needed for Mild Pain., Disp: , Rfl:     losartan (Cozaar) 50 MG tablet, Take 1 tablet by mouth Daily., Disp: 90 tablet, Rfl: 3     Objective   History of Present Illness Harley is here for blood pressure check and lab follow-up.  He has hypertension, hyperlipidemia, low HDL,  stable anxiety, sleep apnea, cervical arthritis with radiculopathy.  He generally feels well.  He tries eat healthy but does not exercise.  He does not monitor his blood pressure.  His weight is unchanged in last 6 months.  He saw neurosurgery today and gabapentin dose was increased.    Review of Systems   Constitutional:  Negative for activity change and unexpected weight change.   Respiratory: Negative.     Cardiovascular: Negative.    Musculoskeletal:  Positive for neck pain.   Neurological:  Positive for numbness.   Psychiatric/Behavioral:  Negative for dysphoric mood.      Physical Exam  Vitals reviewed.   Constitutional:       Appearance: Normal appearance.   Neck:      Vascular: No carotid bruit.   Cardiovascular:      Rate and Rhythm: Normal rate and regular rhythm.      Heart sounds: Normal heart sounds.      Comments: 105/65  Pulmonary:      Effort: Pulmonary effort is normal.      Breath sounds: Normal breath sounds.   Musculoskeletal:      Right lower leg: No edema.      Left lower leg: No edema.   Neurological:      Mental Status: He is alert.   Psychiatric:         Mood and Affect: Mood normal.         Behavior: Behavior normal.         Thought Content: Thought content normal.         Judgment: Judgment normal.       Assessment blood pressure is well controlled.  HDL cholesterol remains low.  LDL cholesterol and triglycerides are normal.  Fasting glucose is mildly elevated at 104.  Kidney and liver functions and complete blood count are normal.  Discussed healthy heart diet and exercise per AHA guidelines as well as weight loss.  We will continue current medicines.  ASSESSMENT    Problems Addressed this Visit          Cardiac and Vasculature    Hyperlipidemia    Low HDL (under 40)    Essential hypertension - Primary       Endocrine and Metabolic    Elevated fasting glucose       Mental Health    Anxiety     Diagnoses         Codes Comments    Essential hypertension    -  Primary ICD-10-CM:  I10  ICD-9-CM: 401.9     Hyperlipidemia, unspecified hyperlipidemia type     ICD-10-CM: E78.5  ICD-9-CM: 272.4     Low HDL (under 40)     ICD-10-CM: E78.6  ICD-9-CM: 272.5     Anxiety     ICD-10-CM: F41.9  ICD-9-CM: 300.00     Elevated fasting glucose     ICD-10-CM: R73.01  ICD-9-CM: 790.21             PLAN  There are no Patient Instructions on file for this visit.    Return in about 6 months (around 12/12/2023) for Annual physical labs plus hemoglobin A1c.

## 2023-08-28 RX ORDER — LOSARTAN POTASSIUM 50 MG/1
TABLET ORAL
Qty: 90 TABLET | Refills: 3 | Status: SHIPPED | OUTPATIENT
Start: 2023-08-28

## 2023-11-27 RX ORDER — ESCITALOPRAM OXALATE 10 MG/1
10 TABLET ORAL DAILY
Qty: 90 TABLET | Refills: 3 | Status: SHIPPED | OUTPATIENT
Start: 2023-11-27

## 2024-01-02 DIAGNOSIS — M54.2 CHRONIC NECK PAIN: ICD-10-CM

## 2024-01-02 DIAGNOSIS — G89.29 CHRONIC NECK PAIN: ICD-10-CM

## 2024-01-02 DIAGNOSIS — M50.122 CERVICAL DISC DISORDER AT C5-C6 LEVEL WITH RADICULOPATHY: ICD-10-CM

## 2024-01-02 DIAGNOSIS — M50.123 CERVICAL DISC DISORDER AT C6-C7 LEVEL WITH RADICULOPATHY: ICD-10-CM

## 2024-01-03 RX ORDER — GABAPENTIN 400 MG/1
400 CAPSULE ORAL 3 TIMES DAILY
Qty: 60 CAPSULE | Refills: 6 | Status: SHIPPED | OUTPATIENT
Start: 2024-01-03

## 2024-01-03 NOTE — TELEPHONE ENCOUNTER
Rx Refill Note  Requested Prescriptions     Pending Prescriptions Disp Refills    gabapentin (NEURONTIN) 400 MG capsule [Pharmacy Med Name: GABAPENTIN CAPS 400MG] 60 capsule 5     Sig: TAKE 1 CAPSULE THREE TIMES A DAY      Last office visit with prescribing clinician: 6/12/2023   Last telemedicine visit with prescribing clinician: Visit date not found   Next office visit with prescribing clinician: 3/11/2024                         Would you like a call back once the refill request has been completed: [] Yes [] No    If the office needs to give you a call back, can they leave a voicemail: [] Yes [] No    Sheryl Waldron MA  01/03/24, 10:22 EST

## 2024-03-14 NOTE — PROGRESS NOTES
"Subjective   Patient ID: Eugene Shabazz is a 54 y.o. 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  He has known osteophytic cervical disc disease at C5-C6 and C6-C7 with bilateral foraminal root entrapment causing neck pain and bilateral arm pain with numbness and tingling.  We increased his gabapentin to 400 mg 3 times daily.  Its benefit has tapered off.  He wants to increase it I told him he probably should increase it to 600 mg twice daily.  We talked about an ablation again and he is not quite ready for that but he will let us know if he would like to do that.  Epidural blocks really did not help him in the past which is why we switched over to gabapentin.  He has no motor deficit.  Will see him in 9 months.  He will let us know if he wants to consider an ablation.      History of Present Illness    The following portions of the patient's history were reviewed and updated as appropriate: allergies, current medications, past family history, past medical history, past social history, past surgical history, and problem list.    Review of Systems   Constitutional:  Negative for fever.   Musculoskeletal:  Negative for back pain and gait problem.   Neurological:  Positive for numbness (hands). Negative for weakness.   All other systems reviewed and are negative.          Objective     Vitals:    03/18/24 1055   BP: 130/76   BP Location: Left arm   Patient Position: Sitting   Cuff Size: Adult   Resp: 20   Weight: 96.6 kg (213 lb)   Height: 174 cm (68.5\")   PainSc: 0-No pain     Body mass index is 31.91 kg/m².    Tobacco Use: Low Risk  (3/18/2024)    Patient History     Smoking Tobacco Use: Never     Smokeless Tobacco Use: Never     Passive Exposure: Not on file          Physical Exam  Constitutional:       Appearance: He is well-developed.   HENT:      Head: Normocephalic and atraumatic.   Eyes:      Extraocular Movements: EOM normal.      " Conjunctiva/sclera: Conjunctivae normal.      Pupils: Pupils are equal, round, and reactive to light.   Neck:      Vascular: No carotid bruit.   Neurological:      Mental Status: He is oriented to person, place, and time.      Coordination: Finger-Nose-Finger Test and Heel to Shin Test normal.      Gait: Gait is intact.      Deep Tendon Reflexes:      Reflex Scores:       Tricep reflexes are 2+ on the right side and 2+ on the left side.       Bicep reflexes are 2+ on the right side and 2+ on the left side.       Brachioradialis reflexes are 2+ on the right side and 2+ on the left side.       Patellar reflexes are 2+ on the right side and 2+ on the left side.       Achilles reflexes are 2+ on the right side and 2+ on the left side.  Psychiatric:         Speech: Speech normal.       Neurologic Exam     Mental Status   Oriented to person, place, and time.   Registration of memory: Good recent and remote memory.   Attention: normal. Concentration: normal.   Speech: speech is normal   Level of consciousness: alert  Knowledge: consistent with education.     Cranial Nerves     CN II   Visual fields full to confrontation.   Visual acuity: normal    CN III, IV, VI   Pupils are equal, round, and reactive to light.  Extraocular motions are normal.     CN V   Facial sensation intact.   Right corneal reflex: normal  Left corneal reflex: normal    CN VII   Facial expression full, symmetric.   Right facial weakness: none  Left facial weakness: none    CN VIII   Hearing: intact    CN IX, X   Palate: symmetric    CN XI   Right sternocleidomastoid strength: normal  Left sternocleidomastoid strength: normal    CN XII   Tongue: not atrophic  Tongue deviation: none    Motor Exam   Muscle bulk: normal  Right arm tone: normal  Left arm tone: normal  Right leg tone: normal  Left leg tone: normal    Strength   Strength 5/5 except as noted.     Sensory Exam   Light touch normal.     Gait, Coordination, and Reflexes     Gait  Gait:  normal    Coordination   Finger to nose coordination: normal  Heel to shin coordination: normal    Reflexes   Right brachioradialis: 2+  Left brachioradialis: 2+  Right biceps: 2+  Left biceps: 2+  Right triceps: 2+  Left triceps: 2+  Right patellar: 2+  Left patellar: 2+  Right achilles: 2+  Left achilles: 2+  Right : 2+  Left : 2+          Assessment & Plan   Independent Review of Radiographic Studies:      I personally reviewed the images from the following studies.    I reviewed his cervical myelogram done on 6/21/2021 which shows osteophytic cervical disc disease at C5-C6 and C6-7 with bilateral foraminal root entrapment particularly at C6-C7.  There are some anterior osteophytes and loss of the cervical lordosis.  Agree with the report.       Medical Decision Making:      Will increase his gabapentin to 600 mg twice daily.  Will have him come back in 9 months.  If things worsen, he will let us know.  If he would like to try an ablation, he will let us know and we can set it up with the [facility].        Diagnoses and all orders for this visit:    1. Chronic neck pain (Primary)  -     gabapentin (NEURONTIN) 600 MG tablet; Take 1 tablet by mouth 2 (Two) Times a Day.  Dispense: 60 tablet; Refill: 5    2. Cervical disc disorder at C5-C6 level with radiculopathy  -     gabapentin (NEURONTIN) 600 MG tablet; Take 1 tablet by mouth 2 (Two) Times a Day.  Dispense: 60 tablet; Refill: 5    3. Cervical disc disorder at C6-C7 level with radiculopathy  -     gabapentin (NEURONTIN) 600 MG tablet; Take 1 tablet by mouth 2 (Two) Times a Day.  Dispense: 60 tablet; Refill: 5    4. Cervical radiculopathy  -     gabapentin (NEURONTIN) 600 MG tablet; Take 1 tablet by mouth 2 (Two) Times a Day.  Dispense: 60 tablet; Refill: 5      Return in about 9 months (around 12/18/2024) for face to face.

## 2024-03-18 ENCOUNTER — OFFICE VISIT (OUTPATIENT)
Dept: NEUROSURGERY | Facility: CLINIC | Age: 54
End: 2024-03-18
Payer: COMMERCIAL

## 2024-03-18 VITALS
SYSTOLIC BLOOD PRESSURE: 130 MMHG | WEIGHT: 213 LBS | RESPIRATION RATE: 20 BRPM | DIASTOLIC BLOOD PRESSURE: 76 MMHG | HEIGHT: 69 IN | BODY MASS INDEX: 31.55 KG/M2

## 2024-03-18 DIAGNOSIS — M50.123 CERVICAL DISC DISORDER AT C6-C7 LEVEL WITH RADICULOPATHY: ICD-10-CM

## 2024-03-18 DIAGNOSIS — M50.122 CERVICAL DISC DISORDER AT C5-C6 LEVEL WITH RADICULOPATHY: ICD-10-CM

## 2024-03-18 DIAGNOSIS — G89.29 CHRONIC NECK PAIN: Primary | ICD-10-CM

## 2024-03-18 DIAGNOSIS — M54.12 CERVICAL RADICULOPATHY: ICD-10-CM

## 2024-03-18 DIAGNOSIS — M54.2 CHRONIC NECK PAIN: Primary | ICD-10-CM

## 2024-03-18 PROCEDURE — 99214 OFFICE O/P EST MOD 30 MIN: CPT | Performed by: NEUROLOGICAL SURGERY

## 2024-03-18 RX ORDER — GABAPENTIN 600 MG/1
600 TABLET ORAL 2 TIMES DAILY
Qty: 60 TABLET | Refills: 5 | Status: SHIPPED | OUTPATIENT
Start: 2024-03-18

## 2024-03-20 DIAGNOSIS — E78.5 HYPERLIPIDEMIA, UNSPECIFIED HYPERLIPIDEMIA TYPE: ICD-10-CM

## 2024-03-20 RX ORDER — ATORVASTATIN CALCIUM 40 MG/1
TABLET, FILM COATED ORAL
Qty: 90 TABLET | Refills: 3 | Status: SHIPPED | OUTPATIENT
Start: 2024-03-20

## 2024-03-26 DIAGNOSIS — I10 ESSENTIAL HYPERTENSION: ICD-10-CM

## 2024-03-26 DIAGNOSIS — R73.01 ELEVATED FASTING GLUCOSE: ICD-10-CM

## 2024-03-26 DIAGNOSIS — E78.6 LOW HDL (UNDER 40): ICD-10-CM

## 2024-03-26 DIAGNOSIS — Z12.5 SCREENING PSA (PROSTATE SPECIFIC ANTIGEN): Primary | ICD-10-CM

## 2024-03-26 DIAGNOSIS — E78.5 HYPERLIPIDEMIA, UNSPECIFIED HYPERLIPIDEMIA TYPE: ICD-10-CM

## 2024-03-27 LAB
ALBUMIN SERPL-MCNC: 4.7 G/DL (ref 3.5–5.2)
ALBUMIN/GLOB SERPL: 2.8 G/DL
ALP SERPL-CCNC: 71 U/L (ref 39–117)
ALT SERPL-CCNC: 44 U/L (ref 1–41)
AST SERPL-CCNC: 37 U/L (ref 1–40)
BASOPHILS # BLD AUTO: 0.02 10*3/MM3 (ref 0–0.2)
BASOPHILS NFR BLD AUTO: 0.4 % (ref 0–1.5)
BILIRUB SERPL-MCNC: 1.1 MG/DL (ref 0–1.2)
BUN SERPL-MCNC: 16 MG/DL (ref 6–20)
BUN/CREAT SERPL: 14.3 (ref 7–25)
CALCIUM SERPL-MCNC: 9.3 MG/DL (ref 8.6–10.5)
CHLORIDE SERPL-SCNC: 103 MMOL/L (ref 98–107)
CHOLEST SERPL-MCNC: 142 MG/DL (ref 0–200)
CO2 SERPL-SCNC: 28.3 MMOL/L (ref 22–29)
CREAT SERPL-MCNC: 1.12 MG/DL (ref 0.76–1.27)
EGFRCR SERPLBLD CKD-EPI 2021: 78.6 ML/MIN/1.73
EOSINOPHIL # BLD AUTO: 0.05 10*3/MM3 (ref 0–0.4)
EOSINOPHIL NFR BLD AUTO: 1 % (ref 0.3–6.2)
ERYTHROCYTE [DISTWIDTH] IN BLOOD BY AUTOMATED COUNT: 12.3 % (ref 12.3–15.4)
GLOBULIN SER CALC-MCNC: 1.7 GM/DL
GLUCOSE SERPL-MCNC: 105 MG/DL (ref 65–99)
HBA1C MFR BLD: 5.6 % (ref 4.8–5.6)
HCT VFR BLD AUTO: 48.3 % (ref 37.5–51)
HDLC SERPL-MCNC: 35 MG/DL (ref 40–60)
HGB BLD-MCNC: 16.4 G/DL (ref 13–17.7)
IMM GRANULOCYTES # BLD AUTO: 0.02 10*3/MM3 (ref 0–0.05)
IMM GRANULOCYTES NFR BLD AUTO: 0.4 % (ref 0–0.5)
LDLC SERPL CALC-MCNC: 78 MG/DL (ref 0–100)
LDLC/HDLC SERPL: 2.09 {RATIO}
LYMPHOCYTES # BLD AUTO: 1.28 10*3/MM3 (ref 0.7–3.1)
LYMPHOCYTES NFR BLD AUTO: 26 % (ref 19.6–45.3)
MCH RBC QN AUTO: 30.4 PG (ref 26.6–33)
MCHC RBC AUTO-ENTMCNC: 34 G/DL (ref 31.5–35.7)
MCV RBC AUTO: 89.6 FL (ref 79–97)
MONOCYTES # BLD AUTO: 0.43 10*3/MM3 (ref 0.1–0.9)
MONOCYTES NFR BLD AUTO: 8.7 % (ref 5–12)
NEUTROPHILS # BLD AUTO: 3.12 10*3/MM3 (ref 1.7–7)
NEUTROPHILS NFR BLD AUTO: 63.5 % (ref 42.7–76)
NRBC BLD AUTO-RTO: 0 /100 WBC (ref 0–0.2)
PLATELET # BLD AUTO: 212 10*3/MM3 (ref 140–450)
POTASSIUM SERPL-SCNC: 4.5 MMOL/L (ref 3.5–5.2)
PROT SERPL-MCNC: 6.4 G/DL (ref 6–8.5)
PSA SERPL-MCNC: 0.48 NG/ML (ref 0–4)
RBC # BLD AUTO: 5.39 10*6/MM3 (ref 4.14–5.8)
SODIUM SERPL-SCNC: 141 MMOL/L (ref 136–145)
TRIGL SERPL-MCNC: 169 MG/DL (ref 0–150)
TSH SERPL DL<=0.005 MIU/L-ACNC: 2.46 UIU/ML (ref 0.27–4.2)
VLDLC SERPL CALC-MCNC: 29 MG/DL (ref 5–40)
WBC # BLD AUTO: 4.92 10*3/MM3 (ref 3.4–10.8)

## 2024-04-02 ENCOUNTER — OFFICE VISIT (OUTPATIENT)
Dept: FAMILY MEDICINE CLINIC | Facility: CLINIC | Age: 54
End: 2024-04-02
Payer: COMMERCIAL

## 2024-04-02 VITALS
OXYGEN SATURATION: 96 % | HEART RATE: 68 BPM | SYSTOLIC BLOOD PRESSURE: 112 MMHG | WEIGHT: 217 LBS | DIASTOLIC BLOOD PRESSURE: 68 MMHG | HEIGHT: 69 IN | RESPIRATION RATE: 16 BRPM | BODY MASS INDEX: 32.14 KG/M2

## 2024-04-02 DIAGNOSIS — E78.6 LOW HDL (UNDER 40): ICD-10-CM

## 2024-04-02 DIAGNOSIS — E78.5 HYPERLIPIDEMIA, UNSPECIFIED HYPERLIPIDEMIA TYPE: ICD-10-CM

## 2024-04-02 DIAGNOSIS — R68.82 DIMINISHED LIBIDO: ICD-10-CM

## 2024-04-02 DIAGNOSIS — F41.9 ANXIETY: ICD-10-CM

## 2024-04-02 DIAGNOSIS — Z12.11 COLON CANCER SCREENING: Primary | ICD-10-CM

## 2024-04-02 DIAGNOSIS — G47.33 OSA (OBSTRUCTIVE SLEEP APNEA): ICD-10-CM

## 2024-04-02 DIAGNOSIS — R73.01 ELEVATED FASTING GLUCOSE: ICD-10-CM

## 2024-04-02 DIAGNOSIS — I10 ESSENTIAL HYPERTENSION: ICD-10-CM

## 2024-04-02 RX ORDER — ROSUVASTATIN CALCIUM 40 MG/1
40 TABLET, COATED ORAL DAILY
Qty: 90 TABLET | Refills: 3 | Status: SHIPPED | OUTPATIENT
Start: 2024-04-02

## 2024-04-02 NOTE — PROGRESS NOTES
Subjective   Eugene Shabazz is a 54 y.o. male. Patient is here today for   Chief Complaint   Patient presents with    Follow-up    Hypertension          Vitals:    04/02/24 1317   BP: 112/68   Pulse: 68   Resp: 16   SpO2: 96%     Body mass index is 32.51 kg/m².    The following portions of the patient's history were reviewed and updated as appropriate: allergies, current medications, past family history, past medical history, past social history, past surgical history and problem list.    Past Medical History:   Diagnosis Date    Hyperlipidemia     Hypertension     Injury of back     2 lumbar surgeries, lamiectomies, most recent 5 years ago - good resolution     Narcolepsy     Neck pain     Peripheral neuropathy     Sleep apnea     Spondylolisthesis, cervical region 08/10/2021      No Known Allergies   Social History     Socioeconomic History    Marital status:    Tobacco Use    Smoking status: Never    Smokeless tobacco: Never   Vaping Use    Vaping status: Never Used   Substance and Sexual Activity    Alcohol use: Yes    Drug use: No    Sexual activity: Defer        Current Outpatient Medications:     ALPRAZolam (XANAX) 0.5 MG tablet, Take 1 tablet by mouth 2 (Two) Times a Day As Needed for Anxiety., Disp: 30 tablet, Rfl: 2    escitalopram (LEXAPRO) 10 MG tablet, TAKE 1 TABLET DAILY, Disp: 90 tablet, Rfl: 3    gabapentin (NEURONTIN) 600 MG tablet, Take 1 tablet by mouth 2 (Two) Times a Day., Disp: 60 tablet, Rfl: 5    ibuprofen (ADVIL,MOTRIN) 200 MG tablet, Take 1 tablet by mouth Every 6 (Six) Hours As Needed for Mild Pain., Disp: , Rfl:     losartan (COZAAR) 50 MG tablet, TAKE 1 TABLET DAILY, Disp: 90 tablet, Rfl: 3    rosuvastatin (CRESTOR) 40 MG tablet, Take 1 tablet by mouth Daily., Disp: 90 tablet, Rfl: 3     EKG not done    Objective   History of Present Illness Harley is here for an annual physical examination.  He has hypertension, hyperlipidemia, mild carotid artery disease, stable anxiety,  cervical arthritis with radiculopathy.  He feels well.  He eats healthy he has been walking couple days a week for exercise.  His weight is up 5 pounds from 8 months ago.  He does not monitor his blood pressure.  He had vascular screening in October 2022 and had mild unilateral carotid artery disease with less than 50% stenosis.  He is followed by neurosurgery.  He had epidural injections which did not help.  Gabapentin was recently increased to 6 5 mg twice a day.  He does complain of decreased libido and some erectile dysfunction and would like to have his testosterone level checked.    Review of Systems   Constitutional:         5 lb weight    Eyes:         Annual exam   Respiratory: Negative.     Cardiovascular: Negative.    Gastrointestinal: Negative.    Genitourinary: Negative.    Neurological: Negative.    Psychiatric/Behavioral: Negative.         Physical Exam  Vitals reviewed.   Constitutional:       Appearance: Normal appearance.   HENT:      Right Ear: Tympanic membrane normal.      Left Ear: Tympanic membrane normal.      Mouth/Throat:      Mouth: Mucous membranes are moist.      Pharynx: Oropharynx is clear.   Eyes:      Extraocular Movements: Extraocular movements intact.      Pupils: Pupils are equal, round, and reactive to light.   Neck:      Vascular: No carotid bruit.   Cardiovascular:      Rate and Rhythm: Normal rate and regular rhythm.      Heart sounds: Normal heart sounds.   Pulmonary:      Effort: Pulmonary effort is normal.      Breath sounds: Normal breath sounds.   Abdominal:      General: Abdomen is flat. Bowel sounds are normal.      Palpations: Abdomen is soft.   Musculoskeletal:      Right lower leg: No edema.      Left lower leg: No edema.   Neurological:      Mental Status: He is alert.   Psychiatric:         Mood and Affect: Mood normal.         Behavior: Behavior normal.         Thought Content: Thought content normal.         Judgment: Judgment normal.         Assessment blood  pressure is normal.  HDL cholesterol has improved to 35 but is still low.  LDL cholesterol is 78.  Goal is 55 or less.  Triglycerides are mildly elevated 169.  Other labs are normal.  Will discontinue atorvastatin and start rosuvastatin 40 mg daily.  Discussed healthy heart diet and exercise per HA guidelines as well as weight loss.  Will order a Cologuard.  Will check an early morning testosterone level.  ASSESSMENT    Problems Addressed this Visit          Cardiac and Vasculature    Hyperlipidemia    Relevant Medications    rosuvastatin (CRESTOR) 40 MG tablet    Low HDL (under 40)    Essential hypertension       Endocrine and Metabolic    Elevated fasting glucose       Mental Health    Anxiety       Sleep    GERMAN (obstructive sleep apnea)     Other Visit Diagnoses       Colon cancer screening    -  Primary    Relevant Orders    Cologuard - Stool, Per Rectum          Diagnoses         Codes Comments    Colon cancer screening    -  Primary ICD-10-CM: Z12.11  ICD-9-CM: V76.51     Essential hypertension     ICD-10-CM: I10  ICD-9-CM: 401.9     Hyperlipidemia, unspecified hyperlipidemia type     ICD-10-CM: E78.5  ICD-9-CM: 272.4     Low HDL (under 40)     ICD-10-CM: E78.6  ICD-9-CM: 272.5     Elevated fasting glucose     ICD-10-CM: R73.01  ICD-9-CM: 790.21     Anxiety     ICD-10-CM: F41.9  ICD-9-CM: 300.00     GERMAN (obstructive sleep apnea)     ICD-10-CM: G47.33  ICD-9-CM: 327.23             PLAN  There are no Patient Instructions on file for this visit.    Return in about 6 months (around 10/2/2024) for lipid.

## 2024-05-07 DIAGNOSIS — M54.12 CERVICAL RADICULOPATHY: ICD-10-CM

## 2024-05-07 DIAGNOSIS — M50.122 CERVICAL DISC DISORDER AT C5-C6 LEVEL WITH RADICULOPATHY: ICD-10-CM

## 2024-05-07 DIAGNOSIS — M54.2 CHRONIC NECK PAIN: ICD-10-CM

## 2024-05-07 DIAGNOSIS — M50.123 CERVICAL DISC DISORDER AT C6-C7 LEVEL WITH RADICULOPATHY: ICD-10-CM

## 2024-05-07 DIAGNOSIS — G89.29 CHRONIC NECK PAIN: ICD-10-CM

## 2024-05-07 RX ORDER — GABAPENTIN 600 MG/1
600 TABLET ORAL 2 TIMES DAILY
Qty: 60 TABLET | Refills: 5 | Status: SHIPPED | OUTPATIENT
Start: 2024-05-07

## 2024-08-23 RX ORDER — LOSARTAN POTASSIUM 50 MG/1
TABLET ORAL
Qty: 90 TABLET | Refills: 3 | Status: SHIPPED | OUTPATIENT
Start: 2024-08-23

## 2024-10-03 DIAGNOSIS — E78.5 HYPERLIPIDEMIA, UNSPECIFIED HYPERLIPIDEMIA TYPE: Primary | ICD-10-CM

## 2024-10-03 LAB
CHOLEST SERPL-MCNC: 122 MG/DL (ref 0–200)
HDLC SERPL-MCNC: 31 MG/DL (ref 40–60)
LDLC SERPL CALC-MCNC: 59 MG/DL (ref 0–100)
LDLC/HDLC SERPL: 1.7 {RATIO}
TRIGL SERPL-MCNC: 191 MG/DL (ref 0–150)
VLDLC SERPL CALC-MCNC: 32 MG/DL (ref 5–40)

## 2024-10-15 ENCOUNTER — OFFICE VISIT (OUTPATIENT)
Dept: FAMILY MEDICINE CLINIC | Facility: CLINIC | Age: 54
End: 2024-10-15
Payer: COMMERCIAL

## 2024-10-15 VITALS
HEART RATE: 85 BPM | SYSTOLIC BLOOD PRESSURE: 110 MMHG | DIASTOLIC BLOOD PRESSURE: 70 MMHG | RESPIRATION RATE: 16 BRPM | WEIGHT: 217.7 LBS | OXYGEN SATURATION: 97 % | BODY MASS INDEX: 32.24 KG/M2 | TEMPERATURE: 97.6 F | HEIGHT: 69 IN

## 2024-10-15 DIAGNOSIS — F41.9 ANXIETY: ICD-10-CM

## 2024-10-15 DIAGNOSIS — E78.6 LOW HDL (UNDER 40): ICD-10-CM

## 2024-10-15 DIAGNOSIS — I10 ESSENTIAL HYPERTENSION: Primary | ICD-10-CM

## 2024-10-15 DIAGNOSIS — E78.5 HYPERLIPIDEMIA, UNSPECIFIED HYPERLIPIDEMIA TYPE: ICD-10-CM

## 2024-10-15 DIAGNOSIS — G47.33 OSA (OBSTRUCTIVE SLEEP APNEA): ICD-10-CM

## 2024-10-15 DIAGNOSIS — Z23 NEED FOR IMMUNIZATION AGAINST INFLUENZA: ICD-10-CM

## 2024-10-15 PROCEDURE — 90656 IIV3 VACC NO PRSV 0.5 ML IM: CPT | Performed by: INTERNAL MEDICINE

## 2024-10-15 PROCEDURE — 99214 OFFICE O/P EST MOD 30 MIN: CPT | Performed by: INTERNAL MEDICINE

## 2024-10-15 PROCEDURE — 90471 IMMUNIZATION ADMIN: CPT | Performed by: INTERNAL MEDICINE

## 2024-10-15 NOTE — PROGRESS NOTES
Subjective   Eugene Shabazz is a 54 y.o. male. Patient is here today for   Chief Complaint   Patient presents with    Follow-up    Hypertension          Vitals:    10/15/24 1258   BP: 110/70   Pulse: 85   Resp: 16   Temp: 97.6 °F (36.4 °C)   SpO2: 97%     Body mass index is 32.62 kg/m².      The following portions of the patient's history were reviewed and updated as appropriate: allergies, current medications, past family history, past medical history, past social history, past surgical history and problem list.    Past Medical History:   Diagnosis Date    Hyperlipidemia     Hypertension     Injury of back     2 lumbar surgeries, lamiectomies, most recent 5 years ago - good resolution     Narcolepsy     Neck pain     Peripheral neuropathy     Sleep apnea     Spondylolisthesis, cervical region 08/10/2021      No Known Allergies   Social History     Socioeconomic History    Marital status:    Tobacco Use    Smoking status: Never    Smokeless tobacco: Never   Vaping Use    Vaping status: Never Used   Substance and Sexual Activity    Alcohol use: Yes    Drug use: No    Sexual activity: Defer        Current Outpatient Medications:     ALPRAZolam (XANAX) 0.5 MG tablet, Take 1 tablet by mouth 2 (Two) Times a Day As Needed for Anxiety., Disp: 30 tablet, Rfl: 2    escitalopram (LEXAPRO) 10 MG tablet, TAKE 1 TABLET DAILY, Disp: 90 tablet, Rfl: 3    gabapentin (NEURONTIN) 600 MG tablet, Take 1 tablet by mouth 2 (Two) Times a Day., Disp: 60 tablet, Rfl: 5    ibuprofen (ADVIL,MOTRIN) 200 MG tablet, Take 1 tablet by mouth Every 6 (Six) Hours As Needed for Mild Pain., Disp: , Rfl:     losartan (COZAAR) 50 MG tablet, TAKE 1 TABLET DAILY, Disp: 90 tablet, Rfl: 3    rosuvastatin (CRESTOR) 40 MG tablet, Take 1 tablet by mouth Daily., Disp: 90 tablet, Rfl: 3     Objective   History of Present Illness Harley is here for a blood pressure check and lab follow-up.  He has hypertension, hyperlipidemia, carotid artery disease,  stable anxiety.  He feels well.  He eats healthy.  He does not exercise.  His weight has been stable.  He does not monitor his blood pressure.  He has obstructive sleep apnea as well and uses CPAP.  He would like to see if he is not a candidate for inspire.    Review of Systems   Constitutional:  Negative for activity change and unexpected weight change.       Physical Exam  Vitals reviewed.   Constitutional:       Appearance: Normal appearance.   Neck:      Vascular: No carotid bruit.   Cardiovascular:      Rate and Rhythm: Normal rate and regular rhythm.      Heart sounds: Normal heart sounds.   Neurological:      Mental Status: He is alert.   Psychiatric:         Mood and Affect: Mood normal.         Behavior: Behavior normal.         Thought Content: Thought content normal.         Judgment: Judgment normal.         Assessment blood pressure is normal.  LDL cholesterol is 59.  Triglycerides are mildly elevated and HDL cholesterol is low.  Discussed healthy heart diet with emphasis on high-fiber and importance of exercise program per AHA guidelines.  Will continue current medicines.  Discussed appropriate immunizations.  Will refer to ENT Dr. Galvez or [place] to discuss inspire.  ASSESSMENT    Problems Addressed this Visit          Cardiac and Vasculature    Hyperlipidemia    Relevant Orders    Vascular Screening (Bundle) CAR    Low HDL (under 40)    Essential hypertension - Primary       Mental Health    Anxiety       Sleep    GERMAN (obstructive sleep apnea)    Relevant Orders    Ambulatory Referral to ENT (Otolaryngology) (Completed)     Other Visit Diagnoses       Need for immunization against influenza        Relevant Orders    Fluzone >6mos (Completed)          Diagnoses         Codes Comments    Essential hypertension    -  Primary ICD-10-CM: I10  ICD-9-CM: 401.9     Hyperlipidemia, unspecified hyperlipidemia type     ICD-10-CM: E78.5  ICD-9-CM: 272.4     Low HDL (under 40)     ICD-10-CM: E78.6  ICD-9-CM:  272.5     Anxiety     ICD-10-CM: F41.9  ICD-9-CM: 300.00     GERMAN (obstructive sleep apnea)     ICD-10-CM: G47.33  ICD-9-CM: 327.23     Need for immunization against influenza     ICD-10-CM: Z23  ICD-9-CM: V04.81             PLAN  There are no Patient Instructions on file for this visit.    Return in about 6 months (around 4/15/2025) for cmp, lipid, psa, cbc, tsh, Annual physical.

## 2024-11-19 DIAGNOSIS — R79.89 LOW TESTOSTERONE: Primary | ICD-10-CM

## 2024-11-21 RX ORDER — ESCITALOPRAM OXALATE 10 MG/1
10 TABLET ORAL DAILY
Qty: 90 TABLET | Refills: 3 | Status: SHIPPED | OUTPATIENT
Start: 2024-11-21

## 2024-11-22 LAB
TESTOST FREE SERPL-MCNC: 5.3 PG/ML (ref 7.2–24)
TESTOST SERPL-MCNC: 245 NG/DL (ref 264–916)

## 2024-12-06 DIAGNOSIS — G89.29 CHRONIC NECK PAIN: ICD-10-CM

## 2024-12-06 DIAGNOSIS — M54.12 CERVICAL RADICULOPATHY: ICD-10-CM

## 2024-12-06 DIAGNOSIS — M54.2 CHRONIC NECK PAIN: ICD-10-CM

## 2024-12-06 DIAGNOSIS — M50.122 CERVICAL DISC DISORDER AT C5-C6 LEVEL WITH RADICULOPATHY: ICD-10-CM

## 2024-12-06 DIAGNOSIS — M50.123 CERVICAL DISC DISORDER AT C6-C7 LEVEL WITH RADICULOPATHY: ICD-10-CM

## 2024-12-06 RX ORDER — GABAPENTIN 600 MG/1
600 TABLET ORAL 2 TIMES DAILY
Qty: 60 TABLET | Refills: 5 | Status: SHIPPED | OUTPATIENT
Start: 2024-12-06

## 2024-12-06 NOTE — TELEPHONE ENCOUNTER
Rx Refill Note  Requested Prescriptions     Pending Prescriptions Disp Refills    gabapentin (NEURONTIN) 600 MG tablet 60 tablet 5     Sig: Take 1 tablet by mouth 2 (Two) Times a Day.      Last office visit with prescribing clinician: 3/18/2024   Last telemedicine visit with prescribing clinician: Visit date not found   Next office visit with prescribing clinician: 12/18/2024                         Would you like a call back once the refill request has been completed: [] Yes [] No    If the office needs to give you a call back, can they leave a voicemail: [] Yes [] No    Sheryl Waldron MA  12/06/24, 13:06 EST

## 2024-12-06 NOTE — TELEPHONE ENCOUNTER
Caller: JT    Relationship: SELF    Best call back number: [phone]    Requested Prescriptions:   Requested Prescriptions     Pending Prescriptions Disp Refills    gabapentin (NEURONTIN) 600 MG tablet 60 tablet 5     Sig: Take 1 tablet by mouth 2 (Two) Times a Day.        Pharmacy where request should be sent:      Last office visit with prescribing clinician: 3/18/2024   Last telemedicine visit with prescribing clinician: Visit date not found   Next office visit with prescribing clinician: 12/18/2024     Additional details provided by patient: PATIENT CALLED STATING THAT HIS PHARMACY SENT OVER A AUTH TO HAVE THIS MEDICATION FILLED 12.05.24 - PATIENT STATES HE HAS LESS THEN 3 DAYS WORTH    Does the patient have less than a 3 day supply:  [x] Yes  [] No    Would you like a call back once the refill request has been completed: [] Yes [] No    If the office needs to give you a call back, can they leave a voicemail: [] Yes [] No    Megan Junior   12/06/24 13:03 EST

## 2024-12-09 ENCOUNTER — OFFICE VISIT (OUTPATIENT)
Dept: FAMILY MEDICINE CLINIC | Facility: CLINIC | Age: 54
End: 2024-12-09
Payer: COMMERCIAL

## 2024-12-09 VITALS
TEMPERATURE: 97.8 F | OXYGEN SATURATION: 96 % | WEIGHT: 218 LBS | BODY MASS INDEX: 32.29 KG/M2 | RESPIRATION RATE: 16 BRPM | HEIGHT: 69 IN | SYSTOLIC BLOOD PRESSURE: 112 MMHG | DIASTOLIC BLOOD PRESSURE: 68 MMHG | HEART RATE: 82 BPM

## 2024-12-09 DIAGNOSIS — E78.6 LOW HDL (UNDER 40): ICD-10-CM

## 2024-12-09 DIAGNOSIS — E78.5 HYPERLIPIDEMIA, UNSPECIFIED HYPERLIPIDEMIA TYPE: ICD-10-CM

## 2024-12-09 DIAGNOSIS — I10 ESSENTIAL HYPERTENSION: Primary | ICD-10-CM

## 2024-12-09 DIAGNOSIS — G47.33 OSA (OBSTRUCTIVE SLEEP APNEA): ICD-10-CM

## 2024-12-09 DIAGNOSIS — R79.89 LOW TESTOSTERONE IN MALE: ICD-10-CM

## 2024-12-09 PROCEDURE — 99213 OFFICE O/P EST LOW 20 MIN: CPT | Performed by: INTERNAL MEDICINE

## 2024-12-09 NOTE — PROGRESS NOTES
Subjective   Eugene Shabazz is a 54 y.o. male. Patient is here today for   Chief Complaint   Patient presents with    Follow-up    Hypertension    Hyperlipidemia          Vitals:    12/09/24 0814   BP: 112/68   Pulse: 82   Resp: 16   Temp: 97.8 °F (36.6 °C)   SpO2: 96%     Body mass index is 32.66 kg/m².      The following portions of the patient's history were reviewed and updated as appropriate: allergies, current medications, past family history, past medical history, past social history, past surgical history and problem list.    Past Medical History:   Diagnosis Date    Hyperlipidemia     Hypertension     Injury of back     2 lumbar surgeries, lamiectomies, most recent 5 years ago - good resolution     Narcolepsy     Neck pain     Peripheral neuropathy     Sleep apnea     Spondylolisthesis, cervical region 08/10/2021      No Known Allergies   Social History     Socioeconomic History    Marital status:    Tobacco Use    Smoking status: Never    Smokeless tobacco: Never   Vaping Use    Vaping status: Never Used   Substance and Sexual Activity    Alcohol use: Yes    Drug use: No    Sexual activity: Defer        Current Outpatient Medications:     ALPRAZolam (XANAX) 0.5 MG tablet, Take 1 tablet by mouth 2 (Two) Times a Day As Needed for Anxiety., Disp: 30 tablet, Rfl: 2    escitalopram (LEXAPRO) 10 MG tablet, TAKE 1 TABLET DAILY, Disp: 90 tablet, Rfl: 3    gabapentin (NEURONTIN) 600 MG tablet, Take 1 tablet by mouth 2 (Two) Times a Day., Disp: 60 tablet, Rfl: 5    ibuprofen (ADVIL,MOTRIN) 200 MG tablet, Take 1 tablet by mouth Every 6 (Six) Hours As Needed for Mild Pain., Disp: , Rfl:     losartan (COZAAR) 50 MG tablet, TAKE 1 TABLET DAILY, Disp: 90 tablet, Rfl: 3    rosuvastatin (CRESTOR) 40 MG tablet, Take 1 tablet by mouth Daily., Disp: 90 tablet, Rfl: 3     Objective   History of Present Illness   History of Present Illness  The patient is a 54-year-old male here for a blood pressure check and lab  follow-up.    He maintains a healthy diet, limiting his intake of sugar and salt. He rarely dines out and enjoys cooking at home.  He has noticed a slight weight gain. He monitors his blood pressure regularly and had an eye examination a few months ago, which showed no changes. He has a vascular screening scheduled for December 2024.    He reports constant fatigue and has been experiencing erectile dysfunction. He uses a CPAP machine, which he finds beneficial. He consulted with Dr. Toby Galvez regarding an implant but was advised to wait as his levels are within the normal range with the use of CPAP.       Review of Systems   Constitutional:         5 lb weight gain   Eyes:         Annual exam   Respiratory: Negative.     Cardiovascular: Negative.    Genitourinary: Negative.    Neurological: Negative.    Psychiatric/Behavioral: Negative.         Physical Exam  Vitals reviewed.   Constitutional:       Appearance: Normal appearance.   Neck:      Vascular: No carotid bruit.   Cardiovascular:      Rate and Rhythm: Normal rate and regular rhythm.      Heart sounds: Normal heart sounds.      Comments: 118/80  Pulmonary:      Effort: Pulmonary effort is normal.      Breath sounds: Normal breath sounds.   Musculoskeletal:      Right lower leg: No edema.      Left lower leg: No edema.   Neurological:      Mental Status: He is alert.   Psychiatric:         Mood and Affect: Mood normal.         Behavior: Behavior normal.         Thought Content: Thought content normal.         Judgment: Judgment normal.       Physical Exam  Vital Signs  Weight is 218. Blood pressure is 108/80.       Results  Laboratory Studies  HDL cholesterol is 31. Triglycerides are 191. LDL cholesterol is 59. Testosterone levels are low.       Assessment   ASSESSMENT    Problems Addressed this Visit          Cardiac and Vasculature    Hyperlipidemia    Low HDL (under 40)    Essential hypertension - Primary       Endocrine and Metabolic    Low testosterone  in male    Relevant Orders    Testosterone, Free, Total       Sleep    GERMAN (obstructive sleep apnea)     Diagnoses         Codes Comments    Essential hypertension    -  Primary ICD-10-CM: I10  ICD-9-CM: 401.9     Hyperlipidemia, unspecified hyperlipidemia type     ICD-10-CM: E78.5  ICD-9-CM: 272.4     Low HDL (under 40)     ICD-10-CM: E78.6  ICD-9-CM: 272.5     GERMAN (obstructive sleep apnea)     ICD-10-CM: G47.33  ICD-9-CM: 327.23     Low testosterone in male     ICD-10-CM: R79.89  ICD-9-CM: 790.99           Assessment & Plan  1. Hypertension.  His blood pressure readings are within the normal range, with a reading of 112/68 and 108/80. He is advised to continue monitoring his blood pressure regularly and maintain a healthy diet low in salt, sodium, sugar, and saturated fat. Regular exercise is also emphasized for overall cardiovascular health.    2. Low HDL Cholesterol.  His HDL cholesterol remains low at 31, which should be 40 or greater. He is advised to continue a healthy diet and regular exercise to help improve his HDL levels.    3. Elevated Triglycerides.  His triglycerides are elevated at 191, which should be less than 150. He is advised to maintain a healthy diet and exercise regularly to help lower his triglyceride levels.    4. Low Testosterone.  His testosterone levels are low. A repeat testosterone level test will be conducted today to confirm the diagnosis. Depending on the results, testosterone replacement therapy may be considered, either in the form of a topical gel or injections, contingent on insurance coverage. He reports symptoms of fatigue and decreased libido, which may be related to low testosterone levels.    5. Obstructive Sleep Apnea.  He is using CPAP and feels he is benefiting from it. He is advised to continue using CPAP as it helps with his condition.    6. Health Maintenance.  He has a vascular screening scheduled for December 19, 2024, at [facility].         PLAN  There are no  Patient Instructions on file for this visit.    Return in about 6 months (around 6/9/2025) for Annual physical.    Patient or patient representative verbalized consent for the use of Ambient Listening during the visit with  Adarsh Corbett MD for chart documentation. 12/9/2024  08:30 EST

## 2024-12-10 LAB
TESTOST FREE SERPL-MCNC: 8.2 PG/ML (ref 7.2–24)
TESTOST SERPL-MCNC: 330 NG/DL (ref 264–916)

## 2024-12-16 NOTE — PROGRESS NOTES
Subjective   Patient ID: Eugene Shabazz is a 54 y.o. male is here today for follow-up on gabapentin     It has been about 9 months since I am seeing him.  He has mainly neck pain and bilateral arm symptoms from cervical stenosis.  He is on gabapentin at 600 mg twice daily.  He does a lot of work on the computer and works out at home.  Recently he has been developing more hand symptoms particularly at night.  He does have a positive Phalen and positive Tinel's sign.  He shakes his hands to get his hands to feel better.  I think he has superimposed upon his cervical problems and carpal tunnel syndrome.  Will fit him with some wrist splints and have him see Dr. Peña.  Will also get an EMG/NCS in preparation for that visit.  I will still see him for his neck and also since am giving him the gabapentin.  Will see him in 9 months with cervical x-rays.      History of Present Illness    The following portions of the patient's history were reviewed and updated as appropriate: allergies, current medications, past family history, past medical history, past social history, past surgical history, and problem list.    Review of Systems   Constitutional:  Negative for fever.   Musculoskeletal:  Negative for neck pain and neck stiffness.   Neurological:  Negative for weakness and numbness.   All other systems reviewed and are negative.      Objective   Physical Exam  Constitutional:       General: He is awake.      Appearance: He is well-developed.   HENT:      Head: Normocephalic and atraumatic.   Eyes:      General: Lids are normal.      Extraocular Movements: Extraocular movements intact.      Conjunctiva/sclera: Conjunctivae normal.      Pupils: Pupils are equal, round, and reactive to light.   Neck:      Vascular: No carotid bruit.   Neurological:      Mental Status: He is alert.      Coordination: Coordination is intact.      Deep Tendon Reflexes:      Reflex Scores:       Tricep reflexes are 2+ on the right side and  2+ on the left side.       Bicep reflexes are 2+ on the right side and 2+ on the left side.       Brachioradialis reflexes are 2+ on the right side and 2+ on the left side.       Patellar reflexes are 2+ on the right side and 2+ on the left side.       Achilles reflexes are 2+ on the right side and 2+ on the left side.  Psychiatric:         Speech: Speech normal.       Neurological Exam  Mental Status  Awake and alert. Oriented only to person, place, time and situation. Recent and remote memory are intact. Speech is normal. Language is fluent with no aphasia. Attention and concentration are normal. Fund of knowledge is appropriate for level of education.    Cranial Nerves  CN II: Visual acuity is normal. Visual fields full to confrontation.  CN III, IV, VI: Extraocular movements intact bilaterally. Normal lids and orbits bilaterally. Pupils equal round and reactive to light bilaterally.  CN V: Facial sensation is normal.  CN VII: Full and symmetric facial movement.  CN IX, X: Palate elevates symmetrically. Normal gag reflex.  CN XI: Shoulder shrug strength is normal.  CN XII: Tongue midline without atrophy or fasciculations.    Motor  Normal muscle bulk throughout. Normal muscle tone.                                               Right                     Left  Rhomboids                            5                          5  Infraspinatus                          5                          5  Supraspinatus                       5                          5  Deltoid                                   5                          5   Biceps                                   5                          5  Brachioradialis                      5                          5   Triceps                                  5                          5   Pronator                                5                          5   Supinator                              5                           5   Wrist flexor                             5                          5   Wrist extensor                       5                          5   Finger flexor                          5                          5   Finger extensor                     5                          5   Interossei                              5                          5   Abductor pollicis brevis         5                          5   Flexor pollicis brevis             5                          5   Opponens pollicis                 5                          5  Extensor digitorum               5                          5  Abductor digiti minimi           5                          5   Abdominal                            5                          5  Glutei                                    5                          5  Hip abductor                         5                          5  Hip adductor                         5                          5   Iliopsoas                               5                          5   Quadriceps                           5                          5   Hamstring                             5                          5   Gastrocnemius                     5                           5   Anterior tibialis                      5                          5   Posterior tibialis                    5                          5   Peroneal                               5                          5  Ankle dorsiflexor                   5                          5  Ankle plantar flexor              5                           5  Extensor hallucis longus      5                           5    Sensory  Light touch is normal in upper and lower extremities. Proprioception is normal in upper and lower extremities.     Reflexes                                            Right                      Left  Brachioradialis                    2+                         2+  Biceps                                 2+                         2+  Triceps                                 2+                         2+  Finger flex                           2+                         2+  Hamstring                            2+                         2+  Patellar                                2+                         2+  Achilles                                2+                         2+    Coordination    Finger-to-nose, rapid alternating movements and heel-to-shin normal bilaterally without dysmetria.    Gait  Casual gait is normal including stance, stride, and arm swing.Normal toe walking. Normal heel walking. Normal tandem gait.       Assessment & Plan   Independent Review of Radiographic Studies:      I reviewed his cervical myelogram done on 6/21/2021 which shows osteophytic cervical disc disease at C5-C6 and C6-7 with bilateral foraminal root entrapment particularly at C6-C7.  There are some anterior osteophytes and loss of the cervical lordosis.  Agree with the report.     Medical Decision Making:      Will have him see hand surgery and get an EMG/NCS.  Bilateral wrist splints will be obtained.  Will see him in 9 months with neck x-rays.  If he develops increased neck pain and arm pain, he will let us know.  He will continue his gabapentin at 600 mg twice daily.    Diagnoses and all orders for this visit:    1. Chronic neck pain (Primary)  -     XR spine cervical ap and lat w flex and ext; Future    2. Cervical disc disorder at C5-C6 level with radiculopathy  -     XR spine cervical ap and lat w flex and ext; Future    3. Cervical disc disorder at C6-C7 level with radiculopathy  -     XR spine cervical ap and lat w flex and ext; Future    4. Bilateral carpal tunnel syndrome  -     Ambulatory Referral to Hand Surgery  -     EMG & Nerve Conduction Test; Future  -      Wrist Hand Orthosis, Wrist Extension Control Cock-up      Return in about 9 months (around 9/18/2025) for face to face.

## 2024-12-17 ENCOUNTER — OFFICE VISIT (OUTPATIENT)
Dept: FAMILY MEDICINE CLINIC | Facility: CLINIC | Age: 54
End: 2024-12-17
Payer: COMMERCIAL

## 2024-12-17 VITALS
HEART RATE: 66 BPM | BODY MASS INDEX: 32.45 KG/M2 | HEIGHT: 69 IN | SYSTOLIC BLOOD PRESSURE: 122 MMHG | WEIGHT: 219.1 LBS | OXYGEN SATURATION: 96 % | RESPIRATION RATE: 16 BRPM | DIASTOLIC BLOOD PRESSURE: 82 MMHG | TEMPERATURE: 96.7 F

## 2024-12-17 DIAGNOSIS — M54.12 CERVICAL RADICULOPATHY: ICD-10-CM

## 2024-12-17 DIAGNOSIS — R79.89 LOW TESTOSTERONE IN MALE: Primary | ICD-10-CM

## 2024-12-17 PROCEDURE — 99213 OFFICE O/P EST LOW 20 MIN: CPT | Performed by: INTERNAL MEDICINE

## 2024-12-17 RX ORDER — TESTOSTERONE GEL, 1% 10 MG/G
1 GEL TRANSDERMAL DAILY
Qty: 75 G | Refills: 1 | Status: SHIPPED | OUTPATIENT
Start: 2024-12-17

## 2024-12-17 NOTE — PROGRESS NOTES
Subjective   Eugene Shabazz is a 54 y.o. male. Patient is here today for   Chief Complaint   Patient presents with    Hypertension          Vitals:    12/17/24 0813   BP: 122/82   Pulse: 66   Resp: 16   Temp: 96.7 °F (35.9 °C)   SpO2: 96%     Body mass index is 32.83 kg/m².    The following portions of the patient's history were reviewed and updated as appropriate: allergies, current medications, past family history, past medical history, past social history, past surgical history and problem list.    Past Medical History:   Diagnosis Date    Hyperlipidemia     Hypertension     Injury of back     2 lumbar surgeries, lamiectomies, most recent 5 years ago - good resolution     Narcolepsy     Neck pain     Peripheral neuropathy     Sleep apnea     Spondylolisthesis, cervical region 08/10/2021      No Known Allergies   Social History     Socioeconomic History    Marital status:    Tobacco Use    Smoking status: Never     Passive exposure: Never    Smokeless tobacco: Never   Vaping Use    Vaping status: Never Used   Substance and Sexual Activity    Alcohol use: Yes    Drug use: No    Sexual activity: Defer        Current Outpatient Medications:     ALPRAZolam (XANAX) 0.5 MG tablet, Take 1 tablet by mouth 2 (Two) Times a Day As Needed for Anxiety., Disp: 30 tablet, Rfl: 2    escitalopram (LEXAPRO) 10 MG tablet, TAKE 1 TABLET DAILY, Disp: 90 tablet, Rfl: 3    gabapentin (NEURONTIN) 600 MG tablet, Take 1 tablet by mouth 2 (Two) Times a Day., Disp: 60 tablet, Rfl: 5    ibuprofen (ADVIL,MOTRIN) 200 MG tablet, Take 1 tablet by mouth Every 6 (Six) Hours As Needed for Mild Pain., Disp: , Rfl:     losartan (COZAAR) 50 MG tablet, TAKE 1 TABLET DAILY, Disp: 90 tablet, Rfl: 3    rosuvastatin (CRESTOR) 40 MG tablet, Take 1 tablet by mouth Daily., Disp: 90 tablet, Rfl: 3    testosterone 12.5 MG/ACT (1%) gel, Place 1 Pump on the skin as directed by provider Daily., Disp: 75 g, Rfl: 1     Objective     History of Present  Illness   History of Present Illness  The patient is a 54-year-old male here for a lab follow-up.    He reports a decrease in sexual desire and some erectile dysfunction.    He is currently on a regimen of gabapentin 600 mg twice daily, which he reports as beneficial for his neck condition. However, he experiences significant issues with his hands, including numbness and tingling, particularly when driving or using a mouse for extended periods. He also reports weakness in his hands and a sensation akin to an electric shock radiating down his arm. His sleep is disrupted due to shoulder numbness. He has been advised to avoid straining his neck as much as possible. His last cervical spine CT scan was conducted in 2021. He has undergone several tests, including a myelogram. He avoids looking up excessively as it exacerbates his symptoms. He has previously attended physical therapy sessions.    MEDICATIONS  gabapentin    Review of Systems    Physical Exam  Vitals reviewed.   Neurological:      Mental Status: He is alert.      Motor: No weakness.      Deep Tendon Reflexes:      Reflex Scores:       Tricep reflexes are 3+ on the right side and 3+ on the left side.       Bicep reflexes are 0 on the right side and 0 on the left side.       Brachioradialis reflexes are 0 on the right side and 0 on the left side.  Psychiatric:         Mood and Affect: Mood normal.         Behavior: Behavior normal.         Thought Content: Thought content normal.         Judgment: Judgment normal.       Physical Exam  Vital Signs  Blood pressure is 122/82.    Results  Laboratory Studies  First testosterone level was 245, repeat was 330.    Imaging  CT of cervical spine in 2021 showed spurs at C6-C7 and C7-T1, with issues at the neural foramina at both C6-C7 and C7-T1 levels.       Assessment    ASSESSMENT    Problems Addressed this Visit          Endocrine and Metabolic    Low testosterone in male - Primary    Relevant Medications     testosterone 12.5 MG/ACT (1%) gel       Neuro    Cervical radiculopathy     Diagnoses         Codes Comments    Low testosterone in male    -  Primary ICD-10-CM: R79.89  ICD-9-CM: 790.99     Cervical radiculopathy     ICD-10-CM: M54.12  ICD-9-CM: 723.4           Assessment & Plan  1. Low testosterone levels.  His initial testosterone level was recorded at 245, which is below the normal range starting at 264. A subsequent test showed an increase to 330. He reports diminished libido and some erectile dysfunction.  A prescription for AndroGel pump 1% was provided, pending prior authorization from his insurance company, [employer]. He was advised to engage in regular exercise, which can naturally elevate testosterone levels.    2. Cervical spine issues.  He reports numbness and tingling in his hands, particularly when using the steering wheel or mouse for extended periods. He also experiences weakness and occasional shock-like sensations down his arm. A CT of his cervical spine from 2021 revealed spurs at C6-C7 and C7-T1, with nerve compression at the neural foramina at both levels. He was advised to work on his posture and avoid activities that aggravate his symptoms, such as looking up. He is currently taking gabapentin 600 mg twice a day, which has helped reduce neck discomfort. Surgical intervention would only be considered if there is a loss of function, weakness, or muscle atrophy.  He has an appointment with neurosurgery tomorrow.    PLAN  There are no Patient Instructions on file for this visit.  Return for Next scheduled follow up.    Patient or patient representative verbalized consent for the use of Ambient Listening during the visit with  Adarsh Corbett MD for chart documentation. 12/17/2024  08:34 EST

## 2024-12-18 ENCOUNTER — OFFICE VISIT (OUTPATIENT)
Dept: NEUROSURGERY | Facility: CLINIC | Age: 54
End: 2024-12-18
Payer: COMMERCIAL

## 2024-12-18 VITALS
BODY MASS INDEX: 32.44 KG/M2 | RESPIRATION RATE: 20 BRPM | SYSTOLIC BLOOD PRESSURE: 126 MMHG | DIASTOLIC BLOOD PRESSURE: 78 MMHG | HEIGHT: 69 IN | WEIGHT: 219 LBS

## 2024-12-18 DIAGNOSIS — M54.2 CHRONIC NECK PAIN: Primary | ICD-10-CM

## 2024-12-18 DIAGNOSIS — G56.03 BILATERAL CARPAL TUNNEL SYNDROME: ICD-10-CM

## 2024-12-18 DIAGNOSIS — G89.29 CHRONIC NECK PAIN: Primary | ICD-10-CM

## 2024-12-18 DIAGNOSIS — M50.122 CERVICAL DISC DISORDER AT C5-C6 LEVEL WITH RADICULOPATHY: ICD-10-CM

## 2024-12-18 DIAGNOSIS — M50.123 CERVICAL DISC DISORDER AT C6-C7 LEVEL WITH RADICULOPATHY: ICD-10-CM

## 2024-12-19 ENCOUNTER — HOSPITAL ENCOUNTER (OUTPATIENT)
Dept: CARDIOLOGY | Facility: HOSPITAL | Age: 54
Discharge: HOME | End: 2024-12-19
Admitting: INTERNAL MEDICINE

## 2024-12-19 DIAGNOSIS — E78.5 HYPERLIPIDEMIA, UNSPECIFIED HYPERLIPIDEMIA TYPE: ICD-10-CM

## 2024-12-19 LAB
BH CV VAS SCREENING CAROTID CCA LEFT: 109 CM/SEC
BH CV VAS SCREENING CAROTID CCA RIGHT: 106 CM/SEC
BH CV VAS SCREENING CAROTID ICA LEFT: 112 CM/SEC
BH CV VAS SCREENING CAROTID ICA RIGHT: 98 CM/SEC
BH CV XLRA MEAS - MID AO DIAM: 1.9 CM
BH CV XLRA MEAS - PAD LEFT ABI PT: 1.11
BH CV XLRA MEAS - PAD LEFT ARM: 131 MMHG
BH CV XLRA MEAS - PAD LEFT LEG PT: 146 MMHG
BH CV XLRA MEAS - PAD RIGHT ABI PT: 1.1
BH CV XLRA MEAS - PAD RIGHT ARM: 125 MMHG
BH CV XLRA MEAS - PAD RIGHT LEG PT: 144 MMHG
BH CV XLRA MEAS LEFT DIST CCA EDV: 29.8 CM/SEC
BH CV XLRA MEAS LEFT DIST CCA PSV: 109 CM/SEC
BH CV XLRA MEAS LEFT ICA/CCA RATIO: 1
BH CV XLRA MEAS LEFT PROX ICA EDV: -39.8 CM/SEC
BH CV XLRA MEAS LEFT PROX ICA PSV: -112 CM/SEC
BH CV XLRA MEAS RIGHT DIST CCA EDV: -21.7 CM/SEC
BH CV XLRA MEAS RIGHT DIST CCA PSV: -106 CM/SEC
BH CV XLRA MEAS RIGHT ICA/CCA RATIO: 0.9
BH CV XLRA MEAS RIGHT PROX ICA EDV: -24.9 CM/SEC
BH CV XLRA MEAS RIGHT PROX ICA PSV: -98.2 CM/SEC

## 2024-12-19 PROCEDURE — 93799 UNLISTED CV SVC/PROCEDURE: CPT

## 2024-12-24 DIAGNOSIS — R79.89 LOW TESTOSTERONE IN MALE: ICD-10-CM

## 2024-12-24 RX ORDER — TESTOSTERONE GEL, 1% 10 MG/G
1 GEL TRANSDERMAL DAILY
Qty: 150 G | Refills: 1 | Status: SHIPPED | OUTPATIENT
Start: 2024-12-24

## 2024-12-24 NOTE — TELEPHONE ENCOUNTER
Pharmacy Name:  [id number] - [place], [place] - [address] - [phone] [facility] [place] [phone]      Pharmacy representative name: JOSSELYN    Pharmacy representative phone number: [phone]     What medication are you calling in regards to: testosterone 12.5 MG/ACT (1%) gel     What question does the pharmacy have: JOSSELYN STATED THAT THE PRESCRIPTION WAS SENT IN FOR A 75 GRAM TUBE. THEY DO NOT HAVE IT AND CANNOT LOCATE THAT STOCK SIZE. THEY ARE ASKING IF THE PRESCRIPTION CAN BE CHANGED  GRAMS. PLEASE ADVISE.    Who is the provider that prescribed the medication: DR. VILCHIS

## 2025-03-17 RX ORDER — ROSUVASTATIN CALCIUM 40 MG/1
40 TABLET, COATED ORAL DAILY
Qty: 90 TABLET | Refills: 3 | Status: SHIPPED | OUTPATIENT
Start: 2025-03-17

## 2025-03-28 ENCOUNTER — HOSPITAL ENCOUNTER (OUTPATIENT)
Dept: CARDIOLOGY | Facility: HOSPITAL | Age: 55
Discharge: HOME | End: 2025-03-28
Payer: COMMERCIAL

## 2025-03-28 ENCOUNTER — LAB (OUTPATIENT)
Dept: LAB | Facility: HOSPITAL | Age: 55
End: 2025-03-28
Payer: COMMERCIAL

## 2025-03-28 ENCOUNTER — TRANSCRIBE ORDERS (OUTPATIENT)
Dept: ADMINISTRATIVE | Facility: HOSPITAL | Age: 55
End: 2025-03-28
Payer: COMMERCIAL

## 2025-03-28 DIAGNOSIS — Z01.818 PRE-OP TESTING: ICD-10-CM

## 2025-03-28 DIAGNOSIS — Z01.818 PRE-OP TESTING: Primary | ICD-10-CM

## 2025-03-28 DIAGNOSIS — I10 HTN (HYPERTENSION), MALIGNANT: ICD-10-CM

## 2025-03-28 LAB
ANION GAP SERPL CALCULATED.3IONS-SCNC: 9.6 MMOL/L (ref 5–15)
BUN SERPL-MCNC: 18 MG/DL (ref 6–20)
BUN/CREAT SERPL: 17.6 (ref 7–25)
CALCIUM SPEC-SCNC: 9.7 MG/DL (ref 8.6–10.5)
CHLORIDE SERPL-SCNC: 102 MMOL/L (ref 98–107)
CO2 SERPL-SCNC: 30.4 MMOL/L (ref 22–29)
CREAT SERPL-MCNC: 1.02 MG/DL (ref 0.76–1.27)
EGFRCR SERPLBLD CKD-EPI 2021: 87.3 ML/MIN/1.73
GLUCOSE SERPL-MCNC: 105 MG/DL (ref 65–99)
POTASSIUM SERPL-SCNC: 4.5 MMOL/L (ref 3.5–5.2)
QT INTERVAL: 381 MS
QTC INTERVAL: 400 MS
SODIUM SERPL-SCNC: 142 MMOL/L (ref 136–145)

## 2025-03-28 PROCEDURE — 93005 ELECTROCARDIOGRAM TRACING: CPT | Performed by: PLASTIC SURGERY

## 2025-03-28 PROCEDURE — 80048 BASIC METABOLIC PNL TOTAL CA: CPT

## 2025-03-28 PROCEDURE — 36415 COLL VENOUS BLD VENIPUNCTURE: CPT

## 2025-04-15 DIAGNOSIS — R79.89 LOW TESTOSTERONE IN MALE: ICD-10-CM

## 2025-04-15 RX ORDER — TESTOSTERONE GEL, 1% 10 MG/G
1 GEL TRANSDERMAL DAILY
Qty: 150 G | Refills: 1 | Status: SHIPPED | OUTPATIENT
Start: 2025-04-15

## 2025-04-15 NOTE — TELEPHONE ENCOUNTER
Rx Refill Note  Requested Prescriptions     Pending Prescriptions Disp Refills    testosterone 12.5 MG/ACT (1%) gel 150 g 1     Sig: Place 1 Pump on the skin as directed by provider Daily.      Last office visit with prescribing clinician: 12/17/2024   Last telemedicine visit with prescribing clinician: Visit date not found   Next office visit with prescribing clinician: 5/5/2025                         Would you like a call back once the refill request has been completed: [] Yes [] No    If the office needs to give you a call back, can they leave a voicemail: [] Yes [] No    Erica Singleton MA  04/15/25, 13:32 EDT

## 2025-04-23 DIAGNOSIS — M54.12 CERVICAL RADICULOPATHY: ICD-10-CM

## 2025-04-23 DIAGNOSIS — M50.122 CERVICAL DISC DISORDER AT C5-C6 LEVEL WITH RADICULOPATHY: ICD-10-CM

## 2025-04-23 DIAGNOSIS — M50.123 CERVICAL DISC DISORDER AT C6-C7 LEVEL WITH RADICULOPATHY: ICD-10-CM

## 2025-04-23 DIAGNOSIS — M54.2 CHRONIC NECK PAIN: ICD-10-CM

## 2025-04-23 DIAGNOSIS — G89.29 CHRONIC NECK PAIN: ICD-10-CM

## 2025-04-23 RX ORDER — GABAPENTIN 600 MG/1
600 TABLET ORAL 2 TIMES DAILY
Qty: 60 TABLET | Refills: 5 | Status: CANCELLED | OUTPATIENT
Start: 2025-04-23

## 2025-04-25 NOTE — TELEPHONE ENCOUNTER
I called and advised Harley that he should one more refill for his gabapentin left and then after that we should be able to refill it.

## 2025-06-10 DIAGNOSIS — M54.12 CERVICAL RADICULOPATHY: ICD-10-CM

## 2025-06-10 DIAGNOSIS — M50.122 CERVICAL DISC DISORDER AT C5-C6 LEVEL WITH RADICULOPATHY: ICD-10-CM

## 2025-06-10 DIAGNOSIS — M54.2 CHRONIC NECK PAIN: ICD-10-CM

## 2025-06-10 DIAGNOSIS — M50.123 CERVICAL DISC DISORDER AT C6-C7 LEVEL WITH RADICULOPATHY: ICD-10-CM

## 2025-06-10 DIAGNOSIS — G89.29 CHRONIC NECK PAIN: ICD-10-CM

## 2025-06-10 RX ORDER — GABAPENTIN 600 MG/1
600 TABLET ORAL 2 TIMES DAILY
Qty: 60 TABLET | Refills: 5 | Status: SHIPPED | OUTPATIENT
Start: 2025-06-10

## 2025-06-10 NOTE — TELEPHONE ENCOUNTER
Rx Refill Note  Requested Prescriptions     Pending Prescriptions Disp Refills    gabapentin (NEURONTIN) 600 MG tablet 60 tablet 5     Sig: Take 1 tablet by mouth 2 (Two) Times a Day.      Last office visit with prescribing clinician: 12/18/2024   Last telemedicine visit with prescribing clinician: Visit date not found   Next office visit with prescribing clinician: 9/24/2025                         Would you like a call back once the refill request has been completed: [] Yes [] No    If the office needs to give you a call back, can they leave a voicemail: [] Yes [] No    Amy Souza MA  06/10/25, 09:20 EDT

## 2025-07-09 DIAGNOSIS — R79.89 LOW TESTOSTERONE IN MALE: ICD-10-CM

## 2025-07-10 RX ORDER — TESTOSTERONE GEL, 1% 10 MG/G
1 GEL TRANSDERMAL DAILY
Qty: 150 G | Refills: 1 | Status: SHIPPED | OUTPATIENT
Start: 2025-07-10

## 2025-07-10 NOTE — TELEPHONE ENCOUNTER
Rx Refill Note  Requested Prescriptions     Pending Prescriptions Disp Refills    testosterone 12.5 MG/ACT (1%) gel 150 g 1     Sig: Place 1 Pump on the skin as directed by provider Daily.      Last office visit with prescribing clinician: 12/17/2024   Last telemedicine visit with prescribing clinician: Visit date not found   Next office visit with prescribing clinician: Visit date not found                         Would you like a call back once the refill request has been completed: [] Yes [] No    If the office needs to give you a call back, can they leave a voicemail: [] Yes [] No    Erica Singleton MA  07/10/25, 15:36 EDT

## 2025-07-15 ENCOUNTER — TELEPHONE (OUTPATIENT)
Dept: FAMILY MEDICINE CLINIC | Facility: CLINIC | Age: 55
End: 2025-07-15
Payer: COMMERCIAL

## 2025-07-15 DIAGNOSIS — I10 ESSENTIAL HYPERTENSION: ICD-10-CM

## 2025-07-15 DIAGNOSIS — R79.89 LOW TESTOSTERONE IN MALE: Primary | ICD-10-CM

## 2025-07-15 DIAGNOSIS — E78.5 HYPERLIPIDEMIA, UNSPECIFIED HYPERLIPIDEMIA TYPE: ICD-10-CM

## 2025-07-15 DIAGNOSIS — Z12.5 SCREENING PSA (PROSTATE SPECIFIC ANTIGEN): ICD-10-CM

## 2025-07-15 DIAGNOSIS — E78.6 LOW HDL (UNDER 40): ICD-10-CM

## 2025-07-15 DIAGNOSIS — Z00.00 ANNUAL PHYSICAL EXAM: ICD-10-CM

## 2025-07-18 ENCOUNTER — LAB (OUTPATIENT)
Dept: LAB | Facility: HOSPITAL | Age: 55
End: 2025-07-18
Payer: COMMERCIAL

## 2025-07-18 LAB
ALBUMIN SERPL-MCNC: 4.8 G/DL (ref 3.5–5.2)
ALBUMIN/GLOB SERPL: 2.3 G/DL
ALP SERPL-CCNC: 52 U/L (ref 39–117)
ALT SERPL W P-5'-P-CCNC: 28 U/L (ref 1–41)
ANION GAP SERPL CALCULATED.3IONS-SCNC: 8 MMOL/L (ref 5–15)
AST SERPL-CCNC: 27 U/L (ref 1–40)
BASOPHILS # BLD AUTO: 0.02 10*3/MM3 (ref 0–0.2)
BASOPHILS NFR BLD AUTO: 0.4 % (ref 0–1.5)
BILIRUB SERPL-MCNC: 0.9 MG/DL (ref 0–1.2)
BILIRUB UR QL STRIP: NEGATIVE
BUN SERPL-MCNC: 17 MG/DL (ref 6–20)
BUN/CREAT SERPL: 15.9 (ref 7–25)
CALCIUM SPEC-SCNC: 9.6 MG/DL (ref 8.6–10.5)
CHLORIDE SERPL-SCNC: 101 MMOL/L (ref 98–107)
CHOLEST SERPL-MCNC: 105 MG/DL (ref 0–200)
CLARITY UR: CLEAR
CO2 SERPL-SCNC: 29 MMOL/L (ref 22–29)
COLOR UR: YELLOW
CREAT SERPL-MCNC: 1.07 MG/DL (ref 0.76–1.27)
DEPRECATED RDW RBC AUTO: 41.9 FL (ref 37–54)
EGFRCR SERPLBLD CKD-EPI 2021: 82.5 ML/MIN/1.73
EOSINOPHIL # BLD AUTO: 0.05 10*3/MM3 (ref 0–0.4)
EOSINOPHIL NFR BLD AUTO: 1 % (ref 0.3–6.2)
ERYTHROCYTE [DISTWIDTH] IN BLOOD BY AUTOMATED COUNT: 12.5 % (ref 12.3–15.4)
GLOBULIN UR ELPH-MCNC: 2.1 GM/DL
GLUCOSE SERPL-MCNC: 114 MG/DL (ref 65–99)
GLUCOSE UR STRIP-MCNC: NEGATIVE MG/DL
HCT VFR BLD AUTO: 48.3 % (ref 37.5–51)
HDLC SERPL-MCNC: 28 MG/DL (ref 40–60)
HGB BLD-MCNC: 16.5 G/DL (ref 13–17.7)
HGB UR QL STRIP.AUTO: NEGATIVE
IMM GRANULOCYTES # BLD AUTO: 0.02 10*3/MM3 (ref 0–0.05)
IMM GRANULOCYTES NFR BLD AUTO: 0.4 % (ref 0–0.5)
KETONES UR QL STRIP: NEGATIVE
LDLC SERPL CALC-MCNC: 50 MG/DL (ref 0–100)
LDLC/HDLC SERPL: 1.64 {RATIO}
LEUKOCYTE ESTERASE UR QL STRIP.AUTO: NEGATIVE
LYMPHOCYTES # BLD AUTO: 1.41 10*3/MM3 (ref 0.7–3.1)
LYMPHOCYTES NFR BLD AUTO: 28.3 % (ref 19.6–45.3)
MCH RBC QN AUTO: 31.2 PG (ref 26.6–33)
MCHC RBC AUTO-ENTMCNC: 34.2 G/DL (ref 31.5–35.7)
MCV RBC AUTO: 91.3 FL (ref 79–97)
MONOCYTES # BLD AUTO: 0.42 10*3/MM3 (ref 0.1–0.9)
MONOCYTES NFR BLD AUTO: 8.4 % (ref 5–12)
NEUTROPHILS NFR BLD AUTO: 3.06 10*3/MM3 (ref 1.7–7)
NEUTROPHILS NFR BLD AUTO: 61.5 % (ref 42.7–76)
NITRITE UR QL STRIP: NEGATIVE
NRBC BLD AUTO-RTO: 0 /100 WBC (ref 0–0.2)
PH UR STRIP.AUTO: 6 [PH] (ref 5–8)
PLATELET # BLD AUTO: 178 10*3/MM3 (ref 140–450)
PMV BLD AUTO: 9.7 FL (ref 6–12)
POTASSIUM SERPL-SCNC: 4.5 MMOL/L (ref 3.5–5.2)
PROT SERPL-MCNC: 6.9 G/DL (ref 6–8.5)
PROT UR QL STRIP: NEGATIVE
PSA SERPL-MCNC: 0.5 NG/ML (ref 0–4)
RBC # BLD AUTO: 5.29 10*6/MM3 (ref 4.14–5.8)
SODIUM SERPL-SCNC: 138 MMOL/L (ref 136–145)
SP GR UR STRIP: 1.02 (ref 1–1.03)
TRIGL SERPL-MCNC: 155 MG/DL (ref 0–150)
TSH SERPL DL<=0.05 MIU/L-ACNC: 2.31 UIU/ML (ref 0.27–4.2)
UROBILINOGEN UR QL STRIP: NORMAL
VLDLC SERPL-MCNC: 27 MG/DL (ref 5–40)
WBC NRBC COR # BLD AUTO: 4.98 10*3/MM3 (ref 3.4–10.8)

## 2025-07-18 PROCEDURE — 80061 LIPID PANEL: CPT | Performed by: INTERNAL MEDICINE

## 2025-07-18 PROCEDURE — 84443 ASSAY THYROID STIM HORMONE: CPT | Performed by: INTERNAL MEDICINE

## 2025-07-18 PROCEDURE — 36415 COLL VENOUS BLD VENIPUNCTURE: CPT | Performed by: INTERNAL MEDICINE

## 2025-07-18 PROCEDURE — 84402 ASSAY OF FREE TESTOSTERONE: CPT | Performed by: INTERNAL MEDICINE

## 2025-07-18 PROCEDURE — 84403 ASSAY OF TOTAL TESTOSTERONE: CPT | Performed by: INTERNAL MEDICINE

## 2025-07-18 PROCEDURE — 81003 URINALYSIS AUTO W/O SCOPE: CPT | Performed by: INTERNAL MEDICINE

## 2025-07-18 PROCEDURE — G0103 PSA SCREENING: HCPCS | Performed by: INTERNAL MEDICINE

## 2025-07-18 PROCEDURE — 85025 COMPLETE CBC W/AUTO DIFF WBC: CPT | Performed by: INTERNAL MEDICINE

## 2025-07-18 PROCEDURE — 80053 COMPREHEN METABOLIC PANEL: CPT | Performed by: INTERNAL MEDICINE

## 2025-07-21 ENCOUNTER — OFFICE VISIT (OUTPATIENT)
Dept: FAMILY MEDICINE CLINIC | Facility: CLINIC | Age: 55
End: 2025-07-21
Payer: COMMERCIAL

## 2025-07-21 VITALS
BODY MASS INDEX: 32.14 KG/M2 | HEART RATE: 71 BPM | SYSTOLIC BLOOD PRESSURE: 114 MMHG | WEIGHT: 217 LBS | DIASTOLIC BLOOD PRESSURE: 70 MMHG | OXYGEN SATURATION: 97 % | RESPIRATION RATE: 16 BRPM | TEMPERATURE: 98.2 F | HEIGHT: 69 IN

## 2025-07-21 DIAGNOSIS — I10 ESSENTIAL HYPERTENSION: Primary | ICD-10-CM

## 2025-07-21 DIAGNOSIS — R73.01 ELEVATED FASTING GLUCOSE: ICD-10-CM

## 2025-07-21 DIAGNOSIS — E78.6 LOW HDL (UNDER 40): ICD-10-CM

## 2025-07-21 DIAGNOSIS — R79.89 LOW TESTOSTERONE IN MALE: ICD-10-CM

## 2025-07-21 DIAGNOSIS — F41.9 ANXIETY: ICD-10-CM

## 2025-07-21 DIAGNOSIS — E78.5 HYPERLIPIDEMIA, UNSPECIFIED HYPERLIPIDEMIA TYPE: ICD-10-CM

## 2025-07-21 DIAGNOSIS — G47.33 OSA (OBSTRUCTIVE SLEEP APNEA): ICD-10-CM

## 2025-07-21 PROCEDURE — 99396 PREV VISIT EST AGE 40-64: CPT | Performed by: INTERNAL MEDICINE

## 2025-07-21 NOTE — PROGRESS NOTES
Subjective   Eugene Shabazz is a 55 y.o. male. Patient is here today for   Chief Complaint   Patient presents with    Annual Exam    Hypertension          Vitals:    07/21/25 1128   BP: 114/70   Pulse: 71   Resp: 16   Temp: 98.2 °F (36.8 °C)   SpO2: 97%     Body mass index is 32.51 kg/m².    The following portions of the patient's history were reviewed and updated as appropriate: allergies, current medications, past family history, past medical history, past social history, past surgical history and problem list.    Past Medical History:   Diagnosis Date    Hyperlipidemia     Hypertension     Injury of back     2 lumbar surgeries, lamiectomies, most recent 5 years ago - good resolution     Narcolepsy     Neck pain     Peripheral neuropathy     Sleep apnea     Spondylolisthesis, cervical region 08/10/2021      No Known Allergies   Social History     Socioeconomic History    Marital status:    Tobacco Use    Smoking status: Never     Passive exposure: Never    Smokeless tobacco: Never   Vaping Use    Vaping status: Never Used   Substance and Sexual Activity    Alcohol use: Yes    Drug use: No    Sexual activity: Defer        Current Outpatient Medications:     escitalopram (LEXAPRO) 10 MG tablet, TAKE 1 TABLET DAILY, Disp: 90 tablet, Rfl: 3    gabapentin (NEURONTIN) 600 MG tablet, Take 1 tablet by mouth 2 (Two) Times a Day., Disp: 60 tablet, Rfl: 5    ibuprofen (ADVIL,MOTRIN) 200 MG tablet, Take 1 tablet by mouth Every 6 (Six) Hours As Needed for Mild Pain., Disp: , Rfl:     losartan (COZAAR) 50 MG tablet, TAKE 1 TABLET DAILY, Disp: 90 tablet, Rfl: 3    rosuvastatin (CRESTOR) 40 MG tablet, TAKE 1 TABLET DAILY, Disp: 90 tablet, Rfl: 3    testosterone 12.5 MG/ACT (1%) gel, Place 1 Pump on the skin as directed by provider Daily., Disp: 150 g, Rfl: 1     EKG not done    Objective   Hypertension     History of Present Illness  The patient is a 55-year-old male who presents for an annual physical  examination.    He reports feeling well overall and has been making efforts to reduce his sugar intake. His job involves some physical activity, and he occasionally uses a treadmill for exercise. He does not monitor his blood pressure regularly, checking it only infrequently. He has been using hearing aids for the past 2 to 3 years and maintains regular dental check-ups, although he does not brush his teeth daily. He does not have any skin issues and does not see a dermatologist, but his wife checks his back for him. He has noticed skin tags around his neck and underarms, which sometimes cause discomfort due to friction with his shirt collar. He reports no urinary issues and continues to use testosterone gel, which he finds beneficial. He is requesting a new prescription for a CPAP machine as his current one is over 5 years old. He had carpal tunnel surgery in the past, which has improved his condition. He continues to take gabapentin 600 mg twice daily and has stopped taking alprazolam. He is currently on escitalopram, losartan 50 mg, Crestor 40 mg, and testosterone gel.    Diet: He is making efforts to reduce his sugar intake.    PAST SURGICAL HISTORY:  - Carpal tunnel surgery       Review of Systems   Constitutional:  Negative for activity change and unexpected weight change.   Eyes:         Annual exam   Respiratory: Negative.     Cardiovascular: Negative.    Gastrointestinal: Negative.    Genitourinary: Negative.    Neurological: Negative.    Psychiatric/Behavioral: Negative.         Physical Exam  Vitals reviewed.   Constitutional:       Appearance: Normal appearance.   HENT:      Right Ear: Tympanic membrane normal.      Left Ear: Tympanic membrane normal.      Mouth/Throat:      Mouth: Mucous membranes are moist.      Pharynx: Oropharynx is clear.   Eyes:      Extraocular Movements: Extraocular movements intact.      Pupils: Pupils are equal, round, and reactive to light.   Neck:      Vascular: No carotid  bruit.   Cardiovascular:      Rate and Rhythm: Normal rate and regular rhythm.      Heart sounds: Normal heart sounds.   Pulmonary:      Effort: Pulmonary effort is normal.      Breath sounds: Normal breath sounds.   Abdominal:      General: Abdomen is flat. Bowel sounds are normal.      Palpations: Abdomen is soft.   Musculoskeletal:      Right lower leg: No edema.      Left lower leg: No edema.   Lymphadenopathy:      Cervical: No cervical adenopathy.   Neurological:      Mental Status: He is alert.   Psychiatric:         Mood and Affect: Mood normal.         Behavior: Behavior normal.         Thought Content: Thought content normal.         Judgment: Judgment normal.     Physical Exam      Results  Labs   - Total cholesterol: 105   - Triglycerides: 155   - HDL cholesterol: Low   - LDL cholesterol: 50   - Blood sugar: 114   - A1c: 5.6   - TSH: 2.31   - Kidney functions: Normal   - Liver functions: Normal   - PSA: 0.499   - Testosterone level: 12/2024, 330      Assessment   ASSESSMENT    Problems Addressed this Visit          Cardiac and Vasculature    Hyperlipidemia    Low HDL (under 40)    Essential hypertension - Primary       Endocrine and Metabolic    Elevated fasting glucose    Low testosterone in male       Mental Health    Anxiety       Sleep    GERMAN (obstructive sleep apnea)     Diagnoses         Codes Comments      Essential hypertension    -  Primary ICD-10-CM: I10  ICD-9-CM: 401.9       Hyperlipidemia, unspecified hyperlipidemia type     ICD-10-CM: E78.5  ICD-9-CM: 272.4       Low HDL (under 40)     ICD-10-CM: E78.6  ICD-9-CM: 272.5       Elevated fasting glucose     ICD-10-CM: R73.01  ICD-9-CM: 790.21       Low testosterone in male     ICD-10-CM: R79.89  ICD-9-CM: 790.99       Anxiety     ICD-10-CM: F41.9  ICD-9-CM: 300.00       GERMAN (obstructive sleep apnea)     ICD-10-CM: G47.33  ICD-9-CM: 327.23           Assessment & Plan  1. Annual physical examination.  - Weight has remained stable since 12/2024,  with a slight decrease from 218 to 217 pounds. Blood pressure readings are within the normal range, although not monitored at home.  - Total cholesterol level is 105, triglycerides are slightly elevated at 155, HDL cholesterol is low, LDL cholesterol is well-controlled at 50. Blood glucose level is 114, A1c was normal at 5.6 during the last visit. TSH level is normal at 2.31. Kidney and liver functions are normal. PSA level is stable at 0.499, showing no significant change from 0.481 a year ago. Testosterone level was 330 in 12/2024.  - Advised to engage in regular exercise, including 150 minutes of cardio per week and two strength training sessions, to maintain health and potentially delay the onset of dementia. Encouraged to monitor blood pressure weekly at home. An A1c test will be conducted during the next visit.  - Continues to use testosterone gel, which he reports as beneficial.    2. Skin tags.  - Skin tags present around the neck and under the arms, sometimes aggravating the collar.  - Common and likely due to friction.  - Advised that these can be removed by twisting and pulling them off or by freezing them.  - No suspicious lesions noted.    3. Medication management.  - Currently taking gabapentin 600 mg twice a day, escitalopram (Lexapro), losartan 50 mg, rosuvastatin 40 mg, and testosterone gel.  - No longer takes alprazolam.  - Medication regimen reviewed and confirmed.    4. CPAP machine replacement.  - Requested a new prescription for a CPAP machine as the current one is at least 5 years old and has surpassed its useful life.  - Prescription for a new CPAP machine will be faxed to his provider.    PLAN  There are no Patient Instructions on file for this visit.    Return in about 6 months (around 1/21/2026) for a1c, cmp, lipid, testosterone.    Patient or patient representative verbalized consent for the use of Ambient Listening during the visit with  Adarsh Corbett MD for chart documentation.  7/21/2025  13:02 EDT

## 2025-07-22 LAB
TESTOST FREE SERPL-MCNC: 6.8 PG/ML (ref 7.2–24)
TESTOST SERPL-MCNC: 329 NG/DL (ref 264–916)

## 2025-08-28 RX ORDER — ESCITALOPRAM OXALATE 10 MG/1
10 TABLET ORAL DAILY
Qty: 90 TABLET | Refills: 3 | Status: SHIPPED | OUTPATIENT
Start: 2025-08-28

## 2025-08-28 RX ORDER — LOSARTAN POTASSIUM 50 MG/1
50 TABLET ORAL DAILY
Qty: 90 TABLET | Refills: 3 | Status: SHIPPED | OUTPATIENT
Start: 2025-08-28

## 2025-08-28 RX ORDER — ROSUVASTATIN CALCIUM 40 MG/1
40 TABLET, COATED ORAL DAILY
Qty: 90 TABLET | Refills: 3 | Status: SHIPPED | OUTPATIENT
Start: 2025-08-28